# Patient Record
Sex: FEMALE | Race: BLACK OR AFRICAN AMERICAN | Employment: OTHER | URBAN - METROPOLITAN AREA
[De-identification: names, ages, dates, MRNs, and addresses within clinical notes are randomized per-mention and may not be internally consistent; named-entity substitution may affect disease eponyms.]

---

## 2019-02-23 ENCOUNTER — APPOINTMENT (OUTPATIENT)
Dept: GENERAL RADIOLOGY | Age: 84
DRG: 064 | End: 2019-02-23
Attending: HOSPITALIST
Payer: MEDICARE

## 2019-02-23 ENCOUNTER — HOSPITAL ENCOUNTER (INPATIENT)
Age: 84
LOS: 5 days | Discharge: NURSING, REHAB OR LONG-TERM CARE | DRG: 064 | End: 2019-02-28
Attending: HOSPITALIST | Admitting: HOSPITALIST
Payer: MEDICARE

## 2019-02-23 DIAGNOSIS — I77.71 INTERNAL CAROTID ARTERY DISSECTION (HCC): ICD-10-CM

## 2019-02-23 DIAGNOSIS — I63.132 CEREBROVASCULAR ACCIDENT (CVA) DUE TO EMBOLISM OF LEFT CAROTID ARTERY (HCC): ICD-10-CM

## 2019-02-23 PROBLEM — I63.9 CVA (CEREBRAL VASCULAR ACCIDENT) (HCC): Status: ACTIVE | Noted: 2019-02-23

## 2019-02-23 PROCEDURE — 65660000000 HC RM CCU STEPDOWN

## 2019-02-23 PROCEDURE — 71045 X-RAY EXAM CHEST 1 VIEW: CPT

## 2019-02-23 PROCEDURE — 74011250637 HC RX REV CODE- 250/637: Performed by: HOSPITALIST

## 2019-02-23 PROCEDURE — 74011250636 HC RX REV CODE- 250/636: Performed by: HOSPITALIST

## 2019-02-23 RX ORDER — PANTOPRAZOLE SODIUM 40 MG/1
40 TABLET, DELAYED RELEASE ORAL DAILY
Status: DISCONTINUED | OUTPATIENT
Start: 2019-02-24 | End: 2019-02-28 | Stop reason: HOSPADM

## 2019-02-23 RX ORDER — LOSARTAN POTASSIUM 50 MG/1
100 TABLET ORAL DAILY
Status: DISCONTINUED | OUTPATIENT
Start: 2019-02-23 | End: 2019-02-23

## 2019-02-23 RX ORDER — SIMVASTATIN 20 MG/1
20 TABLET, FILM COATED ORAL
Status: DISCONTINUED | OUTPATIENT
Start: 2019-02-23 | End: 2019-02-28 | Stop reason: HOSPADM

## 2019-02-23 RX ORDER — ENOXAPARIN SODIUM 100 MG/ML
40 INJECTION SUBCUTANEOUS EVERY 24 HOURS
Status: DISCONTINUED | OUTPATIENT
Start: 2019-02-23 | End: 2019-02-28 | Stop reason: HOSPADM

## 2019-02-23 RX ORDER — GUAIFENESIN 100 MG/5ML
81 LIQUID (ML) ORAL DAILY
Status: DISCONTINUED | OUTPATIENT
Start: 2019-02-24 | End: 2019-02-28 | Stop reason: HOSPADM

## 2019-02-23 RX ORDER — ACETAMINOPHEN 325 MG/1
650 TABLET ORAL
Status: DISCONTINUED | OUTPATIENT
Start: 2019-02-23 | End: 2019-02-28 | Stop reason: HOSPADM

## 2019-02-23 RX ORDER — SODIUM CHLORIDE 0.9 % (FLUSH) 0.9 %
5-40 SYRINGE (ML) INJECTION EVERY 8 HOURS
Status: DISCONTINUED | OUTPATIENT
Start: 2019-02-23 | End: 2019-02-28 | Stop reason: HOSPADM

## 2019-02-23 RX ORDER — CARBIDOPA AND LEVODOPA 25; 100 MG/1; MG/1
1 TABLET ORAL 3 TIMES DAILY
Status: DISCONTINUED | OUTPATIENT
Start: 2019-02-23 | End: 2019-02-28 | Stop reason: HOSPADM

## 2019-02-23 RX ORDER — SODIUM CHLORIDE 0.9 % (FLUSH) 0.9 %
5-40 SYRINGE (ML) INJECTION AS NEEDED
Status: DISCONTINUED | OUTPATIENT
Start: 2019-02-23 | End: 2019-02-28 | Stop reason: HOSPADM

## 2019-02-23 RX ORDER — ACETAMINOPHEN 650 MG/1
650 SUPPOSITORY RECTAL
Status: DISCONTINUED | OUTPATIENT
Start: 2019-02-23 | End: 2019-02-28 | Stop reason: HOSPADM

## 2019-02-23 RX ORDER — LOSARTAN POTASSIUM 50 MG/1
50 TABLET ORAL DAILY
Status: DISCONTINUED | OUTPATIENT
Start: 2019-02-23 | End: 2019-02-26

## 2019-02-23 RX ORDER — LABETALOL HCL 20 MG/4 ML
5 SYRINGE (ML) INTRAVENOUS
Status: DISCONTINUED | OUTPATIENT
Start: 2019-02-23 | End: 2019-02-28 | Stop reason: HOSPADM

## 2019-02-23 RX ORDER — POLYETHYLENE GLYCOL 3350 17 G/17G
17 POWDER, FOR SOLUTION ORAL DAILY
Status: DISCONTINUED | OUTPATIENT
Start: 2019-02-24 | End: 2019-02-28 | Stop reason: HOSPADM

## 2019-02-23 RX ADMIN — ENOXAPARIN SODIUM 40 MG: 40 INJECTION SUBCUTANEOUS at 19:16

## 2019-02-23 RX ADMIN — CARBIDOPA AND LEVODOPA 1 TABLET: 25; 100 TABLET ORAL at 21:21

## 2019-02-23 RX ADMIN — SIMVASTATIN 20 MG: 20 TABLET, FILM COATED ORAL at 21:21

## 2019-02-23 RX ADMIN — Medication 10 ML: at 21:21

## 2019-02-23 RX ADMIN — LOSARTAN POTASSIUM 50 MG: 50 TABLET ORAL at 19:16

## 2019-02-23 NOTE — H&P
History & Physical    Primary Care Provider: Aldair Kothari MD  Source of Information: Patient     History of Presenting Illness:   Kelley Molina is a 80 y.o. female who presents with right arm weakness    80year old female, with a hx of TIAs, parkinson's disease, orthostatic hypotension, HTN, CHF presents to [facility] with right upper extremity weakness since 10:30 AM today. Initially patient said she can't move the right arm at all, arrived in ED, The symptoms improved and able to raise against the gravity. No TPA offered. She denies headache, CP, SOB, lightheadedness, numbness or tingling. Transfer to [facility] for neuro evaluation. Per daughter patient may not compliant her home medicines. She should take ASA, sinemet, losartan, zocor. She has intermittent dizziness and labile blood pressure. The patient denies any fever, chills, chest pain, cough, congestion, recent illness, palpitations, or dysuria. Review of Systems:  General: no changes of weight or sleep patter  HEENT: no headache, no vision changes, no nose discharge, no hearing changes   RES: no wheezing, no cough, no sob  CVS: no cp, no palpitation. Muscular: no joint swelling, no muscle pain, no leg swelling  Skin: no rash, no itching   GI: no vomiting, no diarrhea  : no dysuria, no hematuria  Hemo: no gum bleeding, no petechial   Neuro: hpi, speech normal   Endo: no polydipsia   Psych: denied depression     Past Medical History:   Diagnosis Date    GERD (gastroesophageal reflux disease)     Heart failure ([address]     Hypertension       Past Surgical History:   Procedure Laterality Date    BREAST SURGERY PROCEDURE UNLISTED      l breast 2 cyst    HX GYN      hysterectomy    HX HEENT      catacacts removed     Prior to Admission medications    Medication Sig Start Date End Date Taking? Authorizing Provider   furosemide (LASIX) 40 mg tablet Take 40 mg by mouth daily.     Provider, Historical   ferrous sulfate 325 mg (65 mg iron) tablet Take 325 mg by mouth two (2) times a day. Provider, Historical   Dexlansoprazole (DEXILANT) 60 mg CpDM Take 60 mg by mouth daily. Provider, Historical   sucralfate (CARAFATE) 1 gram tablet Take 1 g by mouth three (3) times daily (with meals). Provider, Historical   ondansetron (ZOFRAN ODT) 4 mg disintegrating tablet Take 4 mg by mouth every eight (8) hours as needed for Nausea. Provider, Historical   potassium chloride SR (KLOR-CON 10) 10 mEq tablet Take 10 mEq by mouth daily (with dinner). Provider, Historical   fluticasone-salmeterol (ADVAIR HFA) 115-21 mcg/actuation inhaler Take 1 Puff by inhalation nightly. Provider, Historical   polyethylene glycol (MIRALAX) 17 gram packet Take 17 g by mouth as needed. Provider, Historical   acetaminophen (TYLENOL ARTHRITIS PAIN) 650 mg CR tablet Take 650 mg by mouth every six (6) hours as needed for Pain. Provider, Historical   lisinopril (PRINIVIL, ZESTRIL) 2.5 mg tablet Take 1 Tab by mouth daily. 12/4/13   Leeann Garcia , MD     No Known Allergies   Family History   Problem Relation Age of Onset    Stroke Mother     Heart Disease Father         SOCIAL HISTORY:  Patient resides:  Independently x   Assisted Living    SNF    With family care       Smoking history:   None x   Former    Chronic      Alcohol history:   None x   Social    Chronic      Ambulates:   Independently    w/cane x   w/walker    w/wc    CODE STATUS:  DNR    Full x   Other      Objective:     Physical Exam:     Visit Vitals  /71 (BP 1 Location: Right arm, BP Patient Position: At rest)   Pulse 86   Temp 98.1 °F (36.7 °C)   Resp 16   SpO2 100%   Breastfeeding? No      O2 Device: Room air    General:  Alert, cooperative, no distress, appears stated age. Head:  Normocephalic, without obvious abnormality, atraumatic. Eyes:  Conjunctivae/corneas clear. PERRL, EOMs intact. Nose: Nares normal. Septum midline.  Mucosa normal. No drainage or sinus tenderness. Throat: Lips, mucosa, and tongue normal. Teeth and gums normal.   Neck: Supple, symmetrical, trachea midline, no adenopathy, thyroid: no enlargement/tenderness/nodules, no carotid bruit and no JVD. Back:   Symmetric, no curvature. ROM normal. No CVA tenderness. Lungs:   Clear to auscultation bilaterally. Chest wall:  No tenderness or deformity. Heart:  Regular rate and rhythm, S1, S2 normal, no murmur, click, rub or gallop. Abdomen:   Soft, non-tender. Bowel sounds normal. No masses,  No organomegaly. Extremities: Extremities normal, atraumatic, no cyanosis or edema. Pulses: 2+ and symmetric all extremities. Skin: Skin color, texture, turgor normal. No rashes or lesions   Neurologic: CNII-XII intact. Right arm strength 5-/5. No sensation changes, vision field normal                Data Review:     Recent Days:  Recent Labs     02/23/19  1238   WBC 3.7   HGB 11.9   HCT 36.8        Recent Labs     02/23/19  1238      K 4.1      CO2 29   GLU 92   BUN 17   CREA 0.92   CA 8.9   ALB 3.7   SGOT 16   ALT 8*   INR 1.0     No results for input(s): PH, PCO2, PO2, HCO3, FIO2 in the last 72 hours.     24 Hour Results:  Recent Results (from the past 24 hour(s))   GLUCOSE, POC    Collection Time: 02/23/19 12:11 PM   Result Value Ref Range    Glucose (POC) 97 65 - 100 mg/dL    Performed by Hoang Echols    CBC WITH AUTOMATED DIFF    Collection Time: 02/23/19 12:38 PM   Result Value Ref Range    WBC 3.7 3.6 - 11.0 K/uL    RBC 4.18 3.80 - 5.20 M/uL    HGB 11.9 11.5 - 16.0 g/dL    HCT 36.8 35.0 - 47.0 %    MCV 88.0 80.0 - 99.0 FL    MCH 28.5 26.0 - 34.0 PG    MCHC 32.3 30.0 - 36.5 g/dL    RDW 14.0 11.5 - 14.5 %    PLATELET 581 594 - 460 K/uL    MPV 9.3 8.9 - 12.9 FL    NRBC 0.0 0  WBC    ABSOLUTE NRBC 0.00 0.00 - 0.01 K/uL    NEUTROPHILS 44 32 - 75 %    LYMPHOCYTES 40 12 - 49 %    MONOCYTES 13 5 - 13 %    EOSINOPHILS 1 0 - 7 %    BASOPHILS 1 0 - 1 %    IMMATURE GRANULOCYTES 0 0.0 - 0.5 %    ABS. NEUTROPHILS 1.6 (L) 1.8 - 8.0 K/UL    ABS. LYMPHOCYTES 1.5 0.8 - 3.5 K/UL    ABS. MONOCYTES 0.5 0.0 - 1.0 K/UL    ABS. EOSINOPHILS 0.1 0.0 - 0.4 K/UL    ABS. BASOPHILS 0.0 0.0 - 0.1 K/UL    ABS. IMM. GRANS. 0.0 0.00 - 0.04 K/UL    DF AUTOMATED     PROTHROMBIN TIME + INR    Collection Time: 02/23/19 12:38 PM   Result Value Ref Range    INR 1.0 0.9 - 1.1      Prothrombin time 10.0 9.0 - 93.0 sec   METABOLIC PANEL, COMPREHENSIVE    Collection Time: 02/23/19 12:38 PM   Result Value Ref Range    Sodium 145 136 - 145 mmol/L    Potassium 4.1 3.5 - 5.1 mmol/L    Chloride 107 97 - 108 mmol/L    CO2 29 21 - 32 mmol/L    Anion gap 9 5 - 15 mmol/L    Glucose 92 65 - 100 mg/dL    BUN 17 6 - 20 MG/DL    Creatinine 0.92 0.55 - 1.02 MG/DL    BUN/Creatinine ratio 18 12 - 20      GFR est AA >60 >60 ml/min/1.73m2    GFR est non-AA 58 (L) >60 ml/min/1.73m2    Calcium 8.9 8.5 - 10.1 MG/DL    Bilirubin, total 0.4 0.2 - 1.0 MG/DL    ALT (SGPT) 8 (L) 12 - 78 U/L    AST (SGOT) 16 15 - 37 U/L    Alk.  phosphatase 54 45 - 117 U/L    Protein, total 7.0 6.4 - 8.2 g/dL    Albumin 3.7 3.5 - 5.0 g/dL    Globulin 3.3 2.0 - 4.0 g/dL    A-G Ratio 1.1 1.1 - 2.2     CK    Collection Time: 02/23/19 12:38 PM   Result Value Ref Range     26 - 192 U/L   TROPONIN I    Collection Time: 02/23/19 12:38 PM   Result Value Ref Range    Troponin-I, Qt. <0.05 <0.05 ng/mL   EKG, 12 LEAD, INITIAL    Collection Time: 02/23/19  1:07 PM   Result Value Ref Range    Ventricular Rate 68 BPM    Atrial Rate 68 BPM    P-R Interval 164 ms    QRS Duration 76 ms    Q-T Interval 420 ms    QTC Calculation (Bezet) 446 ms    Calculated P Axis 75 degrees    Calculated R Axis 16 degrees    Calculated T Axis 31 degrees    Diagnosis       Normal sinus rhythm  Normal ECG  No previous ECGs available     URINALYSIS W/ REFLEX CULTURE    Collection Time: 02/23/19  2:03 PM   Result Value Ref Range    Color YELLOW/STRAW      Appearance CLEAR CLEAR      Specific gravity 1.010 1.003 - 1.030      pH (UA) 7.0 5.0 - 8.0      Protein NEGATIVE  NEG mg/dL    Glucose NEGATIVE  NEG mg/dL    Ketone NEGATIVE  NEG mg/dL    Bilirubin NEGATIVE  NEG      Blood NEGATIVE  NEG      Urobilinogen 0.2 0.2 - 1.0 EU/dL    Nitrites NEGATIVE  NEG      Leukocyte Esterase SMALL (A) NEG      WBC 0-4 0 - 4 /hpf    RBC 0-5 0 - 5 /hpf    Epithelial cells FEW FEW /lpf    Bacteria NEGATIVE  NEG /hpf    UA:UC IF INDICATED CULTURE NOT INDICATED BY UA RESULT CNI           Imaging: head ct no changes     Assessment:     Active Problems:    CVA (cerebral vascular accident) ([facility] [address] (2/23/2019)           Plan:     1. Right arm weakness: CVA vs TIA, symptoms are improving since onset. Will check brain MRI, MRA of neck and brain, Echo. A1c and FLP. Neuro consult, PT/OT/SL. Continue ASA  2. Parkinson's disease: continue sinemet. Neuro following   3. Intermittent dizziness: could due to orthostatic hypotension, patient may have autonomic dysfunction that related to her PD, will check orthostatic vitals. Cautious of BP meds  4.  Chronic diastolic congestive heart failure: follow Echo, no lasix for now   DVT and GI prophylaxis      Patient lives home alone, use a cane     Signed By: Ijeoma Wong MD     February 23, 2019

## 2019-02-24 ENCOUNTER — APPOINTMENT (OUTPATIENT)
Dept: MRI IMAGING | Age: 84
DRG: 064 | End: 2019-02-24
Attending: HOSPITALIST
Payer: MEDICARE

## 2019-02-24 ENCOUNTER — APPOINTMENT (OUTPATIENT)
Dept: NON INVASIVE DIAGNOSTICS | Age: 84
DRG: 064 | End: 2019-02-24
Attending: HOSPITALIST
Payer: MEDICARE

## 2019-02-24 ENCOUNTER — APPOINTMENT (OUTPATIENT)
Dept: CT IMAGING | Age: 84
DRG: 064 | End: 2019-02-24
Attending: PSYCHIATRY & NEUROLOGY
Payer: MEDICARE

## 2019-02-24 PROBLEM — I77.71 INTERNAL CAROTID ARTERY DISSECTION (HCC): Status: ACTIVE | Noted: 2019-02-24

## 2019-02-24 PROBLEM — I63.132 CEREBROVASCULAR ACCIDENT (CVA) DUE TO EMBOLISM OF LEFT CAROTID ARTERY (HCC): Status: ACTIVE | Noted: 2019-02-23

## 2019-02-24 LAB
ECHO AO ROOT DIAM: 2.48 CM
ECHO LA MAJOR AXIS: 3.06 CM
ECHO LA TO AORTIC ROOT RATIO: 1.24
ECHO LV INTERNAL DIMENSION DIASTOLIC: 3.94 CM (ref 3.9–5.3)
ECHO LV INTERNAL DIMENSION SYSTOLIC: 2.34 CM
ECHO LV IVSD: 0.94 CM (ref 0.6–0.9)
ECHO LV MASS 2D: 126.2 G (ref 67–162)
ECHO LV MASS INDEX 2D: 70.7 G/M2 (ref 43–95)
ECHO LV POSTERIOR WALL DIASTOLIC: 0.92 CM (ref 0.6–0.9)
ECHO MV A VELOCITY: 45.29 CM/S
ECHO MV AREA PHT: 1.8 CM2
ECHO MV E DECELERATION TIME (DT): 414.8 MS
ECHO MV E VELOCITY: 0.29 CM/S
ECHO MV E/A RATIO: 0.01
ECHO MV PRESSURE HALF TIME (PHT): 120.3 MS
ECHO PULMONARY ARTERY SYSTOLIC PRESSURE (PASP): 41 MMHG
ECHO TV REGURGITANT MAX VELOCITY: 276.5 CM/S
ECHO TV REGURGITANT PEAK GRADIENT: 30.6 MMHG

## 2019-02-24 PROCEDURE — 74011250636 HC RX REV CODE- 250/636: Performed by: HOSPITALIST

## 2019-02-24 PROCEDURE — 65660000000 HC RM CCU STEPDOWN

## 2019-02-24 PROCEDURE — 70547 MR ANGIOGRAPHY NECK W/O DYE: CPT

## 2019-02-24 PROCEDURE — 74011636320 HC RX REV CODE- 636/320: Performed by: INTERNAL MEDICINE

## 2019-02-24 PROCEDURE — 70498 CT ANGIOGRAPHY NECK: CPT

## 2019-02-24 PROCEDURE — 74011250637 HC RX REV CODE- 250/637: Performed by: PSYCHIATRY & NEUROLOGY

## 2019-02-24 PROCEDURE — 74011000258 HC RX REV CODE- 258: Performed by: INTERNAL MEDICINE

## 2019-02-24 PROCEDURE — 70544 MR ANGIOGRAPHY HEAD W/O DYE: CPT

## 2019-02-24 PROCEDURE — 70551 MRI BRAIN STEM W/O DYE: CPT

## 2019-02-24 PROCEDURE — 74011250636 HC RX REV CODE- 250/636: Performed by: INTERNAL MEDICINE

## 2019-02-24 PROCEDURE — 93306 TTE W/DOPPLER COMPLETE: CPT

## 2019-02-24 PROCEDURE — 70496 CT ANGIOGRAPHY HEAD: CPT

## 2019-02-24 PROCEDURE — 74011250637 HC RX REV CODE- 250/637: Performed by: HOSPITALIST

## 2019-02-24 RX ORDER — SODIUM CHLORIDE 0.9 % (FLUSH) 0.9 %
10 SYRINGE (ML) INJECTION
Status: COMPLETED | OUTPATIENT
Start: 2019-02-24 | End: 2019-02-24

## 2019-02-24 RX ORDER — CLOPIDOGREL BISULFATE 75 MG/1
75 TABLET ORAL DAILY
Status: DISCONTINUED | OUTPATIENT
Start: 2019-02-24 | End: 2019-02-25

## 2019-02-24 RX ORDER — SODIUM CHLORIDE 9 MG/ML
75 INJECTION, SOLUTION INTRAVENOUS CONTINUOUS
Status: DISCONTINUED | OUTPATIENT
Start: 2019-02-24 | End: 2019-02-26

## 2019-02-24 RX ADMIN — SODIUM CHLORIDE 75 ML/HR: 900 INJECTION, SOLUTION INTRAVENOUS at 15:21

## 2019-02-24 RX ADMIN — SODIUM CHLORIDE 500 ML: 900 INJECTION, SOLUTION INTRAVENOUS at 14:51

## 2019-02-24 RX ADMIN — CARBIDOPA AND LEVODOPA 1 TABLET: 25; 100 TABLET ORAL at 15:21

## 2019-02-24 RX ADMIN — CARBIDOPA AND LEVODOPA 1 TABLET: 25; 100 TABLET ORAL at 08:01

## 2019-02-24 RX ADMIN — POLYETHYLENE GLYCOL 3350 17 G: 17 POWDER, FOR SOLUTION ORAL at 08:01

## 2019-02-24 RX ADMIN — ENOXAPARIN SODIUM 40 MG: 40 INJECTION SUBCUTANEOUS at 18:00

## 2019-02-24 RX ADMIN — CARBIDOPA AND LEVODOPA 1 TABLET: 25; 100 TABLET ORAL at 21:11

## 2019-02-24 RX ADMIN — Medication 10 ML: at 21:11

## 2019-02-24 RX ADMIN — Medication 10 ML: at 13:28

## 2019-02-24 RX ADMIN — Medication 10 ML: at 11:38

## 2019-02-24 RX ADMIN — SODIUM CHLORIDE 100 ML: 900 INJECTION, SOLUTION INTRAVENOUS at 13:28

## 2019-02-24 RX ADMIN — CLOPIDOGREL 75 MG: 75 TABLET, FILM COATED ORAL at 11:37

## 2019-02-24 RX ADMIN — ASPIRIN 81 MG CHEWABLE TABLET 81 MG: 81 TABLET CHEWABLE at 08:01

## 2019-02-24 RX ADMIN — IOPAMIDOL 100 ML: 755 INJECTION, SOLUTION INTRAVENOUS at 13:28

## 2019-02-24 RX ADMIN — LOSARTAN POTASSIUM 50 MG: 50 TABLET ORAL at 08:01

## 2019-02-24 RX ADMIN — SIMVASTATIN 20 MG: 20 TABLET, FILM COATED ORAL at 21:11

## 2019-02-24 RX ADMIN — Medication 10 ML: at 06:17

## 2019-02-24 RX ADMIN — PANTOPRAZOLE SODIUM 40 MG: 40 TABLET, DELAYED RELEASE ORAL at 08:01

## 2019-02-24 NOTE — PROGRESS NOTES
1400 dr Cheng Most notified of positive orthostatics and IVF recommended per neuro, per md, will place orders

## 2019-02-24 NOTE — PROGRESS NOTES
Problem: Falls - Risk of  Goal: *Absence of Falls  Document Ana Fall Risk and appropriate interventions in the flowsheet. Outcome: Progressing Towards Goal  Fall Risk Interventions:  Mobility Interventions: Communicate number of staff needed for ambulation/transfer, OT consult for ADLs, Patient to call before getting OOB, PT Consult for mobility concerns         Medication Interventions: Patient to call before getting OOB, Teach patient to arise slowly    Elimination Interventions: Call light in reach, Patient to call for help with toileting needs, Toileting schedule/hourly rounds             Problem: Pressure Injury - Risk of  Goal: *Prevention of pressure injury  Document Salty Scale and appropriate interventions in the flowsheet. Outcome: Progressing Towards Goal  Pressure Injury Interventions:             Activity Interventions: Pressure redistribution bed/mattress(bed type), Increase time out of bed, PT/OT evaluation    Mobility Interventions: Pressure redistribution bed/mattress (bed type), PT/OT evaluation    Nutrition Interventions: Document food/fluid/supplement intake

## 2019-02-24 NOTE — PROGRESS NOTES
02/24/19 0615   Vital Signs   Temp 98.3 °F (36.8 °C)   Temp Source Oral   Pulse (Heart Rate) 75   Heart Rate Source Monitor   Cardiac Rhythm NSR   Resp Rate 17   O2 Sat (%) 96 %   Level of Consciousness Alert   /68   MAP (Calculated) 97   BP 1 Method Automatic   BP 1 Location Left arm   BP Patient Position At rest   More BP/Pulse rows needed? Yes   MEWS Score 1   Alarms Set and Audible Cardiac alarms   Box Number 656   Electrodes Replaced No   Additional Blood Pressure/Pulse Data   Pulse 2 78   BP 2 151/67   MAP 2 (Calculated) 95   BP 2 Location Left arm   BP Method 2 Automatic   Patient Position 2 Sitting   Pulse 3 87   BP 3 (!) 62/40   MAP 3 (Calculated) (!) 47   BP 3 Location Left arm   BP Method 3 Automatic   Patient Position 3 Standing   Pain 1   Pain Scale 1 Numeric (0 - 10)   Pain Intensity 1 0   Patient Stated Pain Goal 0   Oxygen Therapy   O2 Device Room air   Patient Observation   Patient Turned Turns self   Orthostatics completed. BP 62/40 when standing. Patient c/o mild dizziness. Patient returned to lying position. BP increased to 176/72 in lying position. Day shift RN notified. Call bell in reach; patient denies any needs at this time.

## 2019-02-24 NOTE — PROGRESS NOTES
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 And assoc:Late acute versus subacute left MCA territory infarcts involve the left  parietal lobe and sensory cortex. . Cont w  Rt upper ext weakness,numbness. 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 And assoc:Late acute versus subacute left MCA territory infarcts involve the left  parietal lobe and sensory cortex. . Cont w  Rt upper ext weakness,numbness. Distal lt ICA dissection, for asa/plavix     Postural hypotension, ivf bolus and maintenance     Code status: full   DVT prophylaxis: Lovenox     Care Plan discussed with: Patient/Family, Nurse and Consultant Dr. 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 Vital Signs:    Last 24hrs VS reviewed since prior progress note. Most recent are:  Visit Vitals  /85 (BP 1 Location: Left arm, BP Patient Position: At rest)   Pulse 80   Temp 98.1 °F (36.7 °C)   Resp 16   Ht 5' 1\" (1.549 m)   Wt 79.4 kg (175 lb)   SpO2 97%   Breastfeeding?  No   BMI 33.07 kg/m²       No intake or output data in the 24 hours ending 19 [id number] Physical Examination:             Constitutional:  No acute distress, cooperative, pleasant    ENT:  Oral mucous moist, oropharynx benign. Neck supple,    Resp:  CTA bilaterally. No wheezing/rhonchi/rales. No accessory muscle use   CV:  Regular rhythm, normal rate, no murmurs, gallops, rubs    GI:  Soft, non distended, non tender. normoactive bowel sounds, no hepatosplenomegaly     Musculoskeletal:  No edema, warm, 2+ pulses throughout    Neurologic:  Moves all extremities. Rt UP 2/5  AAOx3, CN II-XII reviewed     Psych:  Good insight, Not anxious nor agitated. Skin:  Good turgor, no rashes or ulcers       Data Review:    Review and/or order of clinical lab test      Labs:     Recent Labs     02/23/19  1238   WBC 3.7   HGB 11.9   HCT 36.8        Recent Labs     02/23/19  1238      K 4.1      CO2 29   BUN 17   CREA 0.92   GLU 92   CA 8.9     Recent Labs     02/23/19  1238   SGOT 16   ALT 8*   AP 54   TBILI 0.4   TP 7.0   ALB 3.7   GLOB 3.3     Recent Labs     02/23/19  1238   INR 1.0   PTP 10.0      No results for input(s): FE, TIBC, PSAT, FERR in the last 72 hours. No results found for: FOL, RBCF   No results for input(s): PH, PCO2, PO2 in the last 72 hours.   Recent Labs     02/23/19  1238      TROIQ <0.05     No results found for: CHOL, CHOLX, CHLST, CHOLV, HDL, LDL, LDLC, DLDLP, TGLX, TRIGL, TRIGP, CHHD, CHHDX  Lab Results   Component Value Date/Time    Glucose (POC) 97 02/23/2019 12:11 PM     Lab Results   Component Value Date/Time    Color YELLOW/STRAW 02/23/2019 02:03 PM    Appearance CLEAR 02/23/2019 02:03 PM    Specific gravity 1.010 02/23/2019 02:03 PM    pH (UA) 7.0 02/23/2019 02:03 PM    Protein NEGATIVE  02/23/2019 02:03 PM    Glucose NEGATIVE  02/23/2019 02:03 PM    Ketone NEGATIVE  02/23/2019 02:03 PM    Bilirubin NEGATIVE  02/23/2019 02:03 PM    Urobilinogen 0.2 02/23/2019 02:03 PM    Nitrites NEGATIVE  02/23/2019 02:03 PM    Leukocyte Esterase SMALL (A) 02/23/2019 02:03 PM    Epithelial cells FEW 02/23/2019 02:03 PM    Bacteria NEGATIVE  02/23/2019 02:03 PM    WBC 0-4 02/23/2019 02:03 PM    RBC 0-5 02/23/2019 02:03 PM         Medications Reviewed:     Current Facility-Administered Medications   Medication Dose Route Frequency    clopidogrel (PLAVIX) tablet 75 mg  75 mg Oral DAILY    sodium chloride 0.9 % bolus infusion 500 mL  500 mL IntraVENous ONCE    0.9% sodium chloride infusion  75 mL/hr IntraVENous CONTINUOUS    polyethylene glycol (MIRALAX) packet 17 g  17 g Oral DAILY    sodium chloride (NS) flush 5-40 mL  5-40 mL IntraVENous Q8H    sodium chloride (NS) flush 5-40 mL  5-40 mL IntraVENous PRN    acetaminophen (TYLENOL) tablet 650 mg  650 mg Oral Q4H PRN    Or    acetaminophen (TYLENOL) solution 650 mg  650 mg Per NG tube Q4H PRN    Or    acetaminophen (TYLENOL) suppository 650 mg  650 mg Rectal Q4H PRN    aspirin chewable tablet 81 mg  81 mg Oral DAILY    labetalol (NORMODYNE;TRANDATE) 20 mg/4 mL (5 mg/mL) injection 5 mg  5 mg IntraVENous Q10MIN PRN    enoxaparin (LOVENOX) injection 40 mg  40 mg SubCUTAneous Q24H    carbidopa-levodopa (SINEMET)  mg per tablet 1 Tab  1 Tab Oral TID    pantoprazole (PROTONIX) tablet 40 mg  40 mg Oral DAILY    losartan (COZAAR) tablet 50 mg  50 mg Oral DAILY    simvastatin (ZOCOR) tablet 20 mg  20 mg Oral QHS     ______________________________________________________________________  EXPECTED LENGTH OF STAY: - - -  ACTUAL LENGTH OF STAY:          1                 Marie Cordova MD

## 2019-02-24 NOTE — CONSULTS
Neurointerventional Surgery Consult  aHylee , AGACNP-BC  Neurocritical Care NP  [phone]    Patient: Angel Funk MRN: [id number]  SSN: [id number]    YOB: 1935  Age: 80 y.o. Sex: female        Chief Complaint: Right arm weakness    Subjective:      Angel Funk is a 80 y.o. female with a PMH of TIAs, Parkinson's Disease, orthostatic hypotension, hypertension, CHF, and hyperlipidemia who initially presented to [facility] on 2/23/2019 with right upper extremity weakness. Symptoms started around 11:00 am on yesterday. Patient reported that she could not move her arm at all, but it has improved. Patient reported she also felt like she had some slurred speech with the right arm weakness. She was brought to the ED at [facility] via EMS and a CT of the head was negative for an acute abnormality. She reportedly refused IV tPA. She was transferred to [facility] for further care. An MRI/MRA of the brain and MRA of the neck ws performed which showed left acute vs subacute MCA territory infarcts involving the left parietal lobe and sensory cortex. Moderate chronic microvascular ischemic disease and focal dissection of the distal left ICA. A CTA of the head and neck was further done which confirmed a focal dissection of the distal left ICA. She has been staying with her daughter for the past couple weeks as the patient has been feeling ill, weak, and dizzy. She had chills during this time, but no fever. Per her daughter, she has been non-compliant with taking her home medications. Her daughter is a physician and has been managing her medications. The patient has no history of a stroke. She is taking a baby aspirin at home, however, it was most likely not taken consistently due to non-compliance. Her daughter reports that she has had episodes of TIAs within the past five years that have involved dizziness and weakness in her right arm.  Her daughter reported that she generally has had dizziness, weakness, and fatigue intermittently for the past 8-10 years. She has had a recent visit to the hospital at [facility] within the past two weeks for dizziness related to orthostatic hypotension. [facility] was consulted for further recommendations on endovascular treatment regarding the patient's left ICA dissection. She denies any recent falls, head or neck trauma, headache, tinnitus, visual changes, difficulty swallowing, numbness, back pain, chest pain, dyspnea, palpitations, nausea, vomiting, abdominal pain, muscle/joint stiffness, or easy bruising. She is positive for weakness, neck pain, sinus problems, constipation, dizziness, balance issues, tremors in right hand, and chronic tingling in ankles and thighs.      Past Medical History:   Diagnosis Date    GERD (gastroesophageal reflux disease)     Heart failure ([address]     Hypertension      Past Surgical History:   Procedure Laterality Date    BREAST SURGERY PROCEDURE UNLISTED      l breast 2 cyst    HX GYN      hysterectomy    HX HEENT      catacacts removed      Family History   Problem Relation Age of Onset    Stroke Mother     Heart Disease Father      Social History     Tobacco Use    Smoking status: Never Smoker   Substance Use Topics    Alcohol use: Yes     Comment: occasional      Current Facility-Administered Medications   Medication Dose Route Frequency Provider Last Rate Last Dose    clopidogrel (PLAVIX) tablet 75 mg  75 mg Oral DAILY Hannah Millan K, DO   75 mg at 02/24/19 1137    0.9% sodium chloride infusion  75 mL/hr IntraVENous CONTINUOUS Jasmine Barney MD 75 mL/hr at 02/24/19 1521 75 mL/hr at 02/24/19 1521    polyethylene glycol (MIRALAX) packet 17 g  17 g Oral DAILY Everette Marsh MD   17 g at 02/24/19 0801    sodium chloride (NS) flush 5-40 mL  5-40 mL IntraVENous Q8H Jessica Roy MD   10 mL at 02/24/19 1138    sodium chloride (NS) flush 5-40 mL  5-40 mL IntraVENous PRN Everette Marsh MD        acetaminophen (TYLENOL) tablet 650 mg  650 mg Oral Q4H PRN Drew Kwon MD        Or   [employer] acetaminophen (TYLENOL) solution 650 mg  650 mg Per NG tube Q4H PRN Drew Kwon MD        Or   [employer] acetaminophen (TYLENOL) suppository 650 mg  650 mg Rectal Q4H PRN Drew Kwon MD        aspirin chewable tablet 81 mg  81 mg Oral DAILY Drew Kwon MD   81 mg at 02/24/19 0801    labetalol (NORMODYNE;TRANDATE) 20 mg/4 mL (5 mg/mL) injection 5 mg  5 mg IntraVENous Q10MIN PRN Drew Kwon MD        enoxaparin (LOVENOX) injection 40 mg  40 mg SubCUTAneous Q24H Jessica Roy MD   40 mg at 02/23/19 1916    carbidopa-levodopa (SINEMET)  mg per tablet 1 Tab  1 Tab Oral TID Drew Kwon MD   1 Tab at 02/24/19 1521    pantoprazole (PROTONIX) tablet 40 mg  40 mg Oral DAILY Drew Kwon MD   40 mg at 02/24/19 0801    losartan (COZAAR) tablet 50 mg  50 mg Oral DAILY Drew Kwon MD   50 mg at 02/24/19 0801    simvastatin (ZOCOR) tablet 20 mg  20 mg Oral QHS Jessica Roy MD   20 mg at 02/23/19 2121        No Known Allergies    Review of Systems:  A comprehensive review of systems was negative except for that written in the History of Present Illness. Objective:     Vitals:    02/24/19 1017 02/24/19 1020 02/24/19 1034 02/24/19 1400   BP: 121/58 (!) 78/46 139/50 159/85   Pulse: 82 89  80   Resp:    16   Temp:    98.1 °F (36.7 °C)   SpO2:    97%   Weight:       Height:            Physical Exam:  GENERAL: alert, cooperative, no distress, appears stated age  EYE: conjunctivae/corneas clear. PERRL, EOM's intact. NECK: neck supple and symmetrical, no carotid bruits  LUNG: clear to auscultation bilaterally  HEART: regular rate and rhythm, S1, S2 normal, no murmur, click, rub or gallop  EXTREMITIES:  extremities normal, atraumatic, no cyanosis, trace edema bilateral ankles  SKIN: Normal. and no rash or abnormalities    Neurologic Exam:  Mental Status:  Alert and oriented x 4. Appropriate affect, mood and behavior. Language:    Normal fluency, repetition, comprehension and naming.     Cranial Nerves: Pupils equal, round and reactive to light. Visual fields full to confrontation. Extraocular movements intact. Facial sensation intact. Full facial strength, no asymmetry. Hearing grossly intact bilaterally. No dysarthria. Tongue protrudes to midline, palate elevates symmetrically. Shoulder shrug 5/5 bilaterally. Motor:    Right arm pronator drift     Bulk and tone normal.      5/5 power in LUE, LLE, RLE                                       3/5 power RUE     Noted lower chin/mouth to be trembling intermittently. Sensation:    Sensation intact throughout to light touch and pinprick      Coordination & Gait: FTN and HTS intact. Gait deferred. Recent Results (from the past 24 hour(s))   ECHO ADULT COMPLETE    Collection Time: 02/24/19  8:55 AM   Result Value Ref Range    Ao Root D 2.48 cm    LVIDd 3.94 3.9 - 5.3 cm    LVPWd 0.92 (A) 0.6 - 0.9 cm    LVIDs 2.34 cm    IVSd 0.94 (A) 0.6 - 0.9 cm    MVA (PHT) 1.8 cm2    MV A Joselo 45.29 cm/s    MV E Joselo 0.29 cm/s    MV E/A 0.01     Left Atrium to Aortic Root Ratio 1.24     LV Mass .2 67 - 162 g    LV Mass AL Index 70.7 43 - 95 g/m2    Mitral Valve E Wave Deceleration Time 414.8 ms    Mitral Valve Pressure Half-time 120.3 ms    Left Atrium Major Axis 3.06 cm    Triscuspid Valve Regurgitation Peak Gradient 30.6 mmHg    TR Max Velocity 276.50 cm/s    PASP 41.0 mmHg     Imaging:  CT of Head on 2/23/2019 shows   IMPRESSION: No acute intracranial abnormality. MRI of Brain, MRA of Brain, and MRA of neck on 2/24/2019 shows   IMPRESSION:  1. 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 2. Moderate chronic microvascular ischemic disease. 3. Focal dissection of the distal left internal carotid artery proximal to the  petrous portion. 4. Limited MR angiography neck due to technique. Recommendation: CT angiography head and neck.   I discussed this impression with BARRY Richmond Ellison at 1049 hours on 2/24/2019. CTA of Head and Neck on 2/24/2019 preliminary report shows     CT angiography confirms focal dissection of the distal left internal carotid  artery proximal to the petrous portion. Length of the dissection is  approximately 7 mm in craniocaudal dimension. No associated thrombosis.      No flow-limiting stenosis or vascular occlusion in the intracranial  vasculature. No aneurysm. Common carotid arteries are patent. Carotid  bifurcations are patent. Right internal carotid artery is within normal limits. No pathologic enhancement in the brain. Ossified periosteal reaction in the  right parietal lobe is likely due to old injury.     Final report to follow. Imaging independently reviewed by myself and Dr. Christiana Hernandez. Assessment:     Hospital Problems  Never Reviewed          Codes Class Noted POA    Internal carotid artery dissection [facility]) ICD-10-CM: I77.71  ICD-9-CM: 443.21  2/24/2019 Unknown        * (Principal) Cerebrovascular accident (CVA) due to embolism of left carotid artery ([facility] [address] ICD-10-CM: K64.324  ICD-9-CM: 434.11  2/23/2019 Yes              Plan:   1. Acute Ischemic CVA   - MRI of Brain shows left MCA territory infarcts involving the left parietal lobe and sensory cortex. - patient still has significant right arm weakness, that has reportedly improved, otherwise no other focal neuro deficits   - CVA most likely secondary to embolism from the left internal carotid artery dissection   - TTE shows EF 61-65%, mild grade 1 diastolic dysfunction, mild TVR, and mild pulmonary hypertension, otherwise no other significant abnormalities   - patient on MARLON with ASA/Plavix daily per neurology for stroke prevention given left ICA dissection   - PT/OT eval, SLP as needed    - Neurology following  2.  Left internal carotid artery dissection   - Imaging consistent with focal dissection of the distal left ICA   - agree with medical therapy with MARLON, ASA/Plavix daily as ordered by neurology   - no surgical intervention is needed   - recommend checking P2Y12 in the am to assess therapeutic response to Plavix   - medical management per neurology   - Patient can follow up in [facility] clinic upon discharge in one month for imaging surveillance of the dissection   3. Hx of Parkinson's Disease   - on Sinemet TID   - Hospitalist/Neurology following  4. Hx of HTN   - on home med Losartan daily   - Hospitalist following   5. Hx of Hyperlipidemia   - continue Zocor 20 mg QHS   - Hospitalist following     Plan discussed with Dr. Claude Herrera, Dr. Pamela Chavez, patient, and family. Thank you for this consult and participating in the care of this patient. I have discussed the diagnosis with the patient and the intended plan as seen in the above orders. Patient is in agreement.       Signed By: Gustabo Corbin NP     February 24, 2019

## 2019-02-24 NOTE — PROGRESS NOTES
Bedside shift change report given to [address] [place] [address] (oncoming nurse) by Prince Cordova (offgoing nurse). Report included the following information SBAR, Procedure Summary, MAR, Accordion and Cardiac Rhythm NSR.

## 2019-02-24 NOTE — PROGRESS NOTES
Physical Therapy Note  2/24/19    Order acknowledged and chart reviewed. Pt currently off unit for testing. Will follow up later today vs tomorrow as able/appropriate. 1232: Per neurology note, pt with acute left MCA infarct probably secondary to a left carotid dissection that embolized. Neuro interventional team has been consulted. Will defer and follow up once appropriate.     Thank you,  Sydnee Ellsworth, PT, DPT

## 2019-02-24 NOTE — CONSULTS
NEUROLOGY  2/24/2019     Consulted by: Cy Gomez MD        Patient ID:  Willow Zamarripa  [id number]  80 y.o.  1935    CC: Right arm weakness    HPI    Ms. Sulema Oswald is an 45-year-old woman with multiple risk factors for stroke [facility] who is here because yesterday morning she woke up and felt her right arm was weak. She thought it was getting better but overnight it got worse. Today she can lift it but it is not improving any further. For the past week she is been living with her daughter who is a physician who has been managing her medications. Patient tells me she takes aspirin every day. She denies headache or vision change. Denies any recent falls or trauma. MRI was done this morning and on my review there is PA infarct. 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 Review of Systems   Constitutional: Positive for malaise/fatigue. Eyes: Negative for double vision. Gastrointestinal: Negative for nausea. Musculoskeletal: Negative for falls. Neurological: Positive for speech change, focal weakness and weakness. Negative for dizziness and headaches. All other systems reviewed and are negative.       Past Medical History:   Diagnosis Date    GERD (gastroesophageal reflux disease)     Heart failure (HCC)     Hypertension      Family History   Problem Relation Age of Onset    Stroke Mother     Heart Disease Father      Social History     Socioeconomic History    Marital status:      Spouse name: Not on file    Number of children: Not on file    Years of education: Not on file    Highest education level: Not on file   Social Needs    Financial resource strain: Not on file    Food insecurity - worry: Not on file    Food insecurity - inability: Not on file   [employer] needs - medical: Not on file   [employer] needs - non-medical: Not on file Occupational History    Not on file   Tobacco Use    Smoking status: Never Smoker   Substance and Sexual Activity    Alcohol use: Yes     Comment: occasional    Drug use: No    Sexual activity: Not on file   Other Topics Concern    Not on file   Social History Narrative    Not on file     Current Facility-Administered Medications   Medication Dose Route Frequency    polyethylene glycol (MIRALAX) packet 17 g  17 g Oral DAILY    sodium chloride (NS) flush 5-40 mL  5-40 mL IntraVENous Q8H    sodium chloride (NS) flush 5-40 mL  5-40 mL IntraVENous PRN    acetaminophen (TYLENOL) tablet 650 mg  650 mg Oral Q4H PRN    Or    acetaminophen (TYLENOL) solution 650 mg  650 mg Per NG tube Q4H PRN    Or    acetaminophen (TYLENOL) suppository 650 mg  650 mg Rectal Q4H PRN    aspirin chewable tablet 81 mg  81 mg Oral DAILY    labetalol (NORMODYNE;TRANDATE) 20 mg/4 mL (5 mg/mL) injection 5 mg  5 mg IntraVENous Q10MIN PRN    enoxaparin (LOVENOX) injection 40 mg  40 mg SubCUTAneous Q24H    carbidopa-levodopa (SINEMET)  mg per tablet 1 Tab  1 Tab Oral TID    pantoprazole (PROTONIX) tablet 40 mg  40 mg Oral DAILY    losartan (COZAAR) tablet 50 mg  50 mg Oral DAILY    simvastatin (ZOCOR) tablet 20 mg  20 mg Oral QHS     No Known Allergies    Visit Vitals  /50 (BP Patient Position: At rest)   Pulse 89   Temp 98.1 °F (36.7 °C)   Resp 16   Ht 5' 1\" (1.549 m)   Wt 79.4 kg (175 lb)   SpO2 98%   Breastfeeding? No   BMI 33.07 kg/m²     Physical Exam   Constitutional: She appears well-developed and well-nourished. Cardiovascular: Normal rate. Pulmonary/Chest: Effort normal.   Skin: Skin is warm and dry. Psychiatric: Her behavior is normal.   Vitals reviewed. Neurologic Exam     Mental Status   Elderly woman in bed. Awake and alert. Appropriate. She has a very masklike face with reduced blink rate  Voice is soft. She follows commands slowly.   Pupils are equal, EOMI  Face asymmetric to the right with midline tongue  Facial sensation intact  Right arm 3/5 motor strength, all else 4+ Global  Light touch intact throughout  Gait deferred due to condition            Lab Results   Component Value Date/Time    WBC 3.7 02/23/2019 12:38 PM    HGB 11.9 02/23/2019 12:38 PM    HCT 36.8 02/23/2019 12:38 PM    PLATELET 940 43/86/6779 12:38 PM    MCV 88.0 02/23/2019 12:38 PM     Lab Results   Component Value Date/Time    Glucose 92 02/23/2019 12:38 PM    Glucose (POC) 97 02/23/2019 12:11 PM    Creatinine 0.92 02/23/2019 12:38 PM      No results found for: CHOL, CHOLPOCT, HDL, LDL, LDLC, LDLCPOC, LDLCEXT, TRIGL, TGLPOCT, CHHD, CHHDX  Lab Results   Component Value Date/Time    ALT (SGPT) 8 (L) 02/23/2019 12:38 PM    AST (SGOT) 16 02/23/2019 12:38 PM    Alk. phosphatase 54 02/23/2019 12:38 PM    Bilirubin, total 0.4 02/23/2019 12:38 PM    Albumin 3.7 02/23/2019 12:38 PM    Protein, total 7.0 02/23/2019 12:38 PM    INR 1.0 02/23/2019 12:38 PM    Prothrombin time 10.0 02/23/2019 12:38 PM    PLATELET 965 88/01/0752 12:38 PM        CT Results (maximum last 3): Results from [place] encounter on 02/23/19   CT CODE NEURO HEAD WO CONTRAST    Narrative HEAD CT WITHOUT CONTRAST: 2/23/2019 12:18 PM    INDICATION: CVA vs TIA/right sided weakness    COMPARISON: None. PROCEDURE: Axial images of the head were obtained without contrast. Coronal and  sagittal reformats were performed. CT dose reduction was achieved through use of  a standardized protocol tailored for this examination and automatic exposure  control for dose modulation. FINDINGS: Periventricular white matter hypodensity is nonspecific, but  consistent with chronic small vessel ischemic disease. Mild cerebral atrophy is  age concordant. No loss of gray-white differentiation to suggest late acute or  early subacute infarction. No mass effect or intracranial hemorrhage. Incidental  note is made of a right parietal, outer table, exophytic, calvarial osteoma. Impression IMPRESSION: No acute intracranial abnormality. MRI Results (maximum last 3): Results from [facility] encounter on 02/23/19   MRI BRAIN WO CONT    Narrative EXAM:  MRI BRAIN WO CONT, MRA NECK WO CONT, MRA BRAIN WO CONT    INDICATION:  Right upper extremity weakness. Intermittent dizziness. Parkinson's  disease. COMPARISON: None. TECHNIQUE: Multisequence, multiplanar MRI of the brain without contrast.  Noncontrast time of flight MR angiography of the head. Noncontrast  time-of-flight MR angiography of the neck. (3 separate studies reported  together) Maximum intensity projection reformats of the MR angiography. Examination  performed on the 3 Parul magnet. FINDINGS: MRI Fredda Pummel: Multiple small areas of restricted diffusion in the  posterior left MCA territory involving the left parietal lobe and postcentral  gyrus. There is associated hyperintense T2/flair signal in these areas of  restricted diffusion. No other restricted diffusion. Moderate chronic microvascular ischemic disease in the cerebral white matter. No hydrocephalus. No mass effect or midline shift. No extra-axial fluid  collection. The midline structures, including the cervicomedullary junction, are within  normal limits. Brain MRA:  The vertebral arteries are codominant. The basilar artery and its  branches are normal. There is dissection in the distal left internal carotid  artery, just proximal to the petrous portion. Distal right internal carotid  artery is patent. Anterior and middle cerebral arteries are patent. . There is no  flow-limiting intracranial stenosis. There is no aneurysm. There are no sizable  posterior communicating arteries. Neck MRA: there is no flow-limiting stenosis. Bilateral common carotid arteries  are patent bilateral carotid bifurcations are patent. Distal left internal  carotid artery dissection is obscured by artifact. Right internal carotid artery  is patent.   All measurements utilize NASCET criteria. Impression IMPRESSION:  1. 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 2. Moderate chronic microvascular ischemic disease. 3. Focal dissection of the distal left internal carotid artery proximal to the  petrous portion. 4. Limited MR angiography neck due to technique. Recommendation: CT angiography head and neck. I discussed this impression with BARRY Norman at 1049 hours on 2/24/2019. MRA BRAIN WO CONT    Narrative EXAM:  MRI BRAIN WO CONT, MRA NECK WO CONT, MRA BRAIN WO CONT    INDICATION:  Right upper extremity weakness. Intermittent dizziness. Parkinson's  disease. COMPARISON: None. TECHNIQUE: Multisequence, multiplanar MRI of the brain without contrast.  Noncontrast time of flight MR angiography of the head. Noncontrast  time-of-flight MR angiography of the neck. (3 separate studies reported  together) Maximum intensity projection reformats of the MR angiography. Examination  performed on the 3 Parul magnet. FINDINGS: MRI [place]bone: Multiple small areas of restricted diffusion in the  posterior left MCA territory involving the left parietal lobe and postcentral  gyrus. There is associated hyperintense T2/flair signal in these areas of  restricted diffusion. No other restricted diffusion. Moderate chronic microvascular ischemic disease in the cerebral white matter. No hydrocephalus. No mass effect or midline shift. No extra-axial fluid  collection. The midline structures, including the cervicomedullary junction, are within  normal limits. Brain MRA:  The vertebral arteries are codominant. The basilar artery and its  branches are normal. There is dissection in the distal left internal carotid  artery, just proximal to the petrous portion. Distal right internal carotid  artery is patent. Anterior and middle cerebral arteries are patent. . There is no  flow-limiting intracranial stenosis.  There is no aneurysm. There are no sizable  posterior communicating arteries. Neck MRA: there is no flow-limiting stenosis. Bilateral common carotid arteries  are patent bilateral carotid bifurcations are patent. Distal left internal  carotid artery dissection is obscured by artifact. Right internal carotid artery  is patent. All measurements utilize NASCET criteria. Impression IMPRESSION:  1. 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 2. Moderate chronic microvascular ischemic disease. 3. Focal dissection of the distal left internal carotid artery proximal to the  petrous portion. 4. Limited MR angiography neck due to technique. Recommendation: CT angiography head and neck. I discussed this impression with BARRY Almaraz at 1049 hours on 2/24/2019. MRA NECK WO CONT    Narrative EXAM:  MRI BRAIN WO CONT, MRA NECK WO CONT, MRA BRAIN WO CONT    INDICATION:  Right upper extremity weakness. Intermittent dizziness. Parkinson's  disease. COMPARISON: None. TECHNIQUE: Multisequence, multiplanar MRI of the brain without contrast.  Noncontrast time of flight MR angiography of the head. Noncontrast  time-of-flight MR angiography of the neck. (3 separate studies reported  together) Maximum intensity projection reformats of the MR angiography. Examination  performed on the 3 Parul magnet. FINDINGS: MRI Mel Hitch: Multiple small areas of restricted diffusion in the  posterior left MCA territory involving the left parietal lobe and postcentral  gyrus. There is associated hyperintense T2/flair signal in these areas of  restricted diffusion. No other restricted diffusion. Moderate chronic microvascular ischemic disease in the cerebral white matter. No hydrocephalus. No mass effect or midline shift. No extra-axial fluid  collection. The midline structures, including the cervicomedullary junction, are within  normal limits.     Brain MRA:  The vertebral arteries are codominant. The basilar artery and its  branches are normal. There is dissection in the distal left internal carotid  artery, just proximal to the petrous portion. Distal right internal carotid  artery is patent. Anterior and middle cerebral arteries are patent. . There is no  flow-limiting intracranial stenosis. There is no aneurysm. There are no sizable  posterior communicating arteries. Neck MRA: there is no flow-limiting stenosis. Bilateral common carotid arteries  are patent bilateral carotid bifurcations are patent. Distal left internal  carotid artery dissection is obscured by artifact. Right internal carotid artery  is patent. All measurements utilize NASCET criteria. Impression IMPRESSION:  1. 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 2. Moderate chronic microvascular ischemic disease. 3. Focal dissection of the distal left internal carotid artery proximal to the  petrous portion. 4. Limited MR angiography neck due to technique. Recommendation: CT angiography head and neck. I discussed this impression with BARRY Noriega at 1049 hours on 2/24/2019. VAS/US/Carotid Doppler Results (maximum last 3): No results found for this or any previous visit. PET Results (maximum last 3): No results found for this or any previous visit. 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 I have already contacted the neuro interventional nurse practitioner who is going to start the consult. I will place her on aspirin and Plavix in the meantime. Prefer not to anticoagulate given this is an intracranial dissection. We will defer to NIS changes as indicated. CTA has been ordered. Any acute changes please activate code stroke.   Discussed with primary team.        Tamiko Santos, DO  NEUROLOGIST  Diplomate ABPN  2/24/2019

## 2019-02-25 LAB — P2Y12 PLT RESPONSE,PPPR: 234 PRU (ref 194–418)

## 2019-02-25 PROCEDURE — 97530 THERAPEUTIC ACTIVITIES: CPT

## 2019-02-25 PROCEDURE — 97165 OT EVAL LOW COMPLEX 30 MIN: CPT

## 2019-02-25 PROCEDURE — 85576 BLOOD PLATELET AGGREGATION: CPT

## 2019-02-25 PROCEDURE — 65660000000 HC RM CCU STEPDOWN

## 2019-02-25 PROCEDURE — 74011250637 HC RX REV CODE- 250/637: Performed by: HOSPITALIST

## 2019-02-25 PROCEDURE — 92523 SPEECH SOUND LANG COMPREHEN: CPT | Performed by: SPEECH-LANGUAGE PATHOLOGIST

## 2019-02-25 PROCEDURE — 36415 COLL VENOUS BLD VENIPUNCTURE: CPT

## 2019-02-25 PROCEDURE — 74011250636 HC RX REV CODE- 250/636: Performed by: HOSPITALIST

## 2019-02-25 PROCEDURE — 97162 PT EVAL MOD COMPLEX 30 MIN: CPT

## 2019-02-25 PROCEDURE — 97535 SELF CARE MNGMENT TRAINING: CPT

## 2019-02-25 PROCEDURE — 74011250637 HC RX REV CODE- 250/637: Performed by: PSYCHIATRY & NEUROLOGY

## 2019-02-25 RX ORDER — CLOPIDOGREL BISULFATE 75 MG/1
150 TABLET ORAL DAILY
Status: DISCONTINUED | OUTPATIENT
Start: 2019-02-26 | End: 2019-02-28 | Stop reason: HOSPADM

## 2019-02-25 RX ORDER — MIDODRINE HYDROCHLORIDE 5 MG/1
2.5 TABLET ORAL
Status: DISCONTINUED | OUTPATIENT
Start: 2019-02-25 | End: 2019-02-27

## 2019-02-25 RX ORDER — CLOPIDOGREL BISULFATE 75 MG/1
75 TABLET ORAL
Status: COMPLETED | OUTPATIENT
Start: 2019-02-25 | End: 2019-02-25

## 2019-02-25 RX ADMIN — ASPIRIN 81 MG CHEWABLE TABLET 81 MG: 81 TABLET CHEWABLE at 08:30

## 2019-02-25 RX ADMIN — Medication 10 ML: at 21:01

## 2019-02-25 RX ADMIN — MIDODRINE HYDROCHLORIDE 2.5 MG: 5 TABLET ORAL at 11:49

## 2019-02-25 RX ADMIN — LOSARTAN POTASSIUM 50 MG: 50 TABLET ORAL at 08:30

## 2019-02-25 RX ADMIN — CLOPIDOGREL BISULFATE 75 MG: 75 TABLET, FILM COATED ORAL at 10:10

## 2019-02-25 RX ADMIN — CARBIDOPA AND LEVODOPA 1 TABLET: 25; 100 TABLET ORAL at 21:01

## 2019-02-25 RX ADMIN — CARBIDOPA AND LEVODOPA 1 TABLET: 25; 100 TABLET ORAL at 15:43

## 2019-02-25 RX ADMIN — CARBIDOPA AND LEVODOPA 1 TABLET: 25; 100 TABLET ORAL at 08:30

## 2019-02-25 RX ADMIN — SIMVASTATIN 20 MG: 20 TABLET, FILM COATED ORAL at 21:01

## 2019-02-25 RX ADMIN — POLYETHYLENE GLYCOL 3350 17 G: 17 POWDER, FOR SOLUTION ORAL at 08:30

## 2019-02-25 RX ADMIN — ENOXAPARIN SODIUM 40 MG: 40 INJECTION SUBCUTANEOUS at 21:01

## 2019-02-25 RX ADMIN — PANTOPRAZOLE SODIUM 40 MG: 40 TABLET, DELAYED RELEASE ORAL at 08:30

## 2019-02-25 RX ADMIN — Medication 10 ML: at 07:00

## 2019-02-25 RX ADMIN — CLOPIDOGREL 75 MG: 75 TABLET, FILM COATED ORAL at 08:30

## 2019-02-25 RX ADMIN — Medication 10 ML: at 15:42

## 2019-02-25 NOTE — PROGRESS NOTES
Speech LAnguage Pathology evaluation/discharge  Patient: Basilia Pierre (5 y.o. female)  Date: 2/25/2019  Primary Diagnosis: CVA (cerebral vascular accident) [facility]) [I63.9]       Precautions: fall       ASSESSMENT :  Based on the objective data described below, the patient presents with no aphasia, dysarthria, or apraxia. Integrated language not formally assessed but appeared intact in conversation. Patient mostly concerned with orthostatic hypotension and her safety for returning home. Swallowing not assessed but patient and Rn denied any issues at breakfast. STAND passed. Skilled acute therapy provided by a speech-language pathologist is not indicated at this time. PLAN :  Recommendations:  1. None  Discharge Recommendations: None     SUBJECTIVE:   Patient stated she had some speech issues initially but it has resolved. NAD.      OBJECTIVE:     Past Medical History:   Diagnosis Date    GERD (gastroesophageal reflux disease)     Heart failure ([facility] [address]     Hypertension      Past Surgical History:   Procedure Laterality Date    BREAST SURGERY PROCEDURE UNLISTED      l breast 2 cyst    HX GYN      hysterectomy    HX HEENT      catacacts removed     Prior Level of Function/Home Situation:   Home Situation  Home Environment: Private residence  One/Two Story Residence: One story  Living Alone: Yes  Support Systems: Child(tootie)  Patient Expects to be Discharged to[de-identified] Private residence  Current DME Used/Available at Home: None  Mental Status:  Neurologic State: Alert  Orientation Level: Oriented X4  Cognition: Appropriate decision making, Follows commands  Perception: Appears intact  Perseveration: No perseveration noted  Safety/Judgement: Awareness of environment, Insight into deficits  Motor Speech:  Oral-Motor Structure/Motor Speech  Face: No impairment  Labial: No impairment  Apraxic Characteristics: None  Dysarthric Characteristics: None  Overall Impairment Severity: None  Language Comprehension and Expression:  Auditory Comprehension  Auditory Impairment: No   Verbal Expression  Primary Mode of Expression: Verbal  Initiation: No impairment  Sentence Formulation: No impairment  Conversation: No impairment     The NOMS functional outcome measure was used to quantify this patient's level of expressive language impairment. Based on the NOMS, the patient was determined to be at level 7 for spoken language expression. NOMS Spoken Language Expression:  Level 1 (CN): Verbalizations not meaningful to anyone. Level 2 (CM): Few attempts accurate/appropriate. Max cues to elicit automatic/imitative words/phrases. Level 3 (CL): Communication partner responsible for communication; Mod cues for words/phrases meaningful in context  Level 4 (CK): Initiate during simple, routine activities w/familiar partner. Mod cues to produce simple sentences  Level 5 (Mau Kris): Initiates communication with familiar and unfamiliar partners. Min cues for complex sentences. Level 6 (CI): Communicates for most activities. Some limitations still present for vocational/social activities. Rarely cued for complex info  Level 7 [facility]): Independent communication. ABIMBOLA (2003). National Outcomes Measurement System (NOMS): Adult Speech-Language Pathology User's Guide. Pain:  Pain Scale 1: Numeric (0 - 10)  Pain Intensity 1: 0     After treatment:   []   Patient left in no apparent distress sitting up in chair  [x]   Patient left in no apparent distress in bed  [x]   Call bell left within reach  [x]   Nursing notified  []   Caregiver present  []   Bed alarm activated    COMMUNICATION/EDUCATION:   The patients plan of care including findings and recommendations was discussed with: Registered Nurse. Patient was educated regarding Her deficit(s) of aphasia as this relates to Her diagnosis of CVA. She demonstrated Excellent understanding as evidenced by verbalization.   [x]   Patient/family have participated as able and agree with findings and recommendations. []   Patient is unable to participate in plan of care at this time. Thank you for this referral.  Rl Mills.  Jewel Magallon MS, CCC-SLP, BCS-S  Time Calculation: 10 mins

## 2019-02-25 NOTE — PROGRESS NOTES
Physical Therapy Note  2/25/19    Orthostatic Assessment     02/25/19 0910 02/25/19 0923 02/25/19 0926   Vital Signs   Pulse (Heart Rate) 76 83 75   /59 116/65 (!) 96/27   MAP (Calculated) 86 82 (!) 50   BP 1 Location Right arm Right arm Right arm   BP 1 Method Automatic Automatic Automatic   BP Patient Position At rest;Head of bed elevated (Comment degrees) Sitting Standing     Symptomatic with c/o nausea and dizziness.     Kim Vallejo, PT, DPT

## 2019-02-25 NOTE — PROGRESS NOTES
Problem: Falls - Risk of  Goal: *Absence of Falls  Document Ana Fall Risk and appropriate interventions in the flowsheet.   Outcome: Progressing Towards Goal  Fall Risk Interventions:  Mobility Interventions: Communicate number of staff needed for ambulation/transfer, OT consult for ADLs, Patient to call before getting OOB, PT Consult for assist device competence, PT Consult for mobility concerns         Medication Interventions: Assess postural VS orthostatic hypotension, Evaluate medications/consider consulting pharmacy, Patient to call before getting OOB, Teach patient to arise slowly    Elimination Interventions: Call light in reach, Toileting schedule/hourly rounds, Toilet paper/wipes in reach

## 2019-02-25 NOTE — CONSULTS
Neurology Progress Note    Patient ID:  Cely Adler  [id number]  80 y.o.  1935    Chief Complaint: Right arm weakness    Subjective:     69-year-old woman who presented with right arm weakness and speech change. She is feeling improvement since admission, but still weak in the right arm. 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 Neuro interventional evaluated her. No intervention indicated. This morning her platelet assay is subtherapeutic. Lipid panel is still pending. She still gets very hypotensive on sitting and standing. Objective:       ROS:  Per HPI  All other 12 pt ROS negative    Meds:  Current Facility-Administered Medications   Medication Dose Route Frequency    [START ON 2/26/2019] clopidogrel (PLAVIX) tablet 150 mg  150 mg Oral DAILY    0.9% sodium chloride infusion  75 mL/hr IntraVENous CONTINUOUS    polyethylene glycol (MIRALAX) packet 17 g  17 g Oral DAILY    sodium chloride (NS) flush 5-40 mL  5-40 mL IntraVENous Q8H    sodium chloride (NS) flush 5-40 mL  5-40 mL IntraVENous PRN    acetaminophen (TYLENOL) tablet 650 mg  650 mg Oral Q4H PRN    Or    acetaminophen (TYLENOL) solution 650 mg  650 mg Per NG tube Q4H PRN    Or    acetaminophen (TYLENOL) suppository 650 mg  650 mg Rectal Q4H PRN    aspirin chewable tablet 81 mg  81 mg Oral DAILY    labetalol (NORMODYNE;TRANDATE) 20 mg/4 mL (5 mg/mL) injection 5 mg  5 mg IntraVENous Q10MIN PRN    enoxaparin (LOVENOX) injection 40 mg  40 mg SubCUTAneous Q24H    carbidopa-levodopa (SINEMET)  mg per tablet 1 Tab  1 Tab Oral TID    pantoprazole (PROTONIX) tablet 40 mg  40 mg Oral DAILY    losartan (COZAAR) tablet 50 mg  50 mg Oral DAILY    simvastatin (ZOCOR) tablet 20 mg  20 mg Oral QHS       MRI Results (maximum last 3):   Results from [place] encounter on 02/23/19   MRI BRAIN WO CONT    Narrative EXAM:  MRI BRAIN WO CONT, MRA NECK WO CONT, MRA BRAIN WO CONT    INDICATION:  Right upper extremity weakness. Intermittent dizziness. Parkinson's  disease. COMPARISON: None. TECHNIQUE: Multisequence, multiplanar MRI of the brain without contrast.  Noncontrast time of flight MR angiography of the head. Noncontrast  time-of-flight MR angiography of the neck. (3 separate studies reported  together) Maximum intensity projection reformats of the MR angiography. Examination  performed on the 3 Parul magnet. FINDINGS: MRI Salo Strickland: Multiple small areas of restricted diffusion in the  posterior left MCA territory involving the left parietal lobe and postcentral  gyrus. There is associated hyperintense T2/flair signal in these areas of  restricted diffusion. No other restricted diffusion. Moderate chronic microvascular ischemic disease in the cerebral white matter. No hydrocephalus. No mass effect or midline shift. No extra-axial fluid  collection. The midline structures, including the cervicomedullary junction, are within  normal limits. Brain MRA:  The vertebral arteries are codominant. The basilar artery and its  branches are normal. There is dissection in the distal left internal carotid  artery, just proximal to the petrous portion. Distal right internal carotid  artery is patent. Anterior and middle cerebral arteries are patent. . There is no  flow-limiting intracranial stenosis. There is no aneurysm. There are no sizable  posterior communicating arteries. Neck MRA: there is no flow-limiting stenosis. Bilateral common carotid arteries  are patent bilateral carotid bifurcations are patent. Distal left internal  carotid artery dissection is obscured by artifact. Right internal carotid artery  is patent. All measurements utilize NASCET criteria. Impression IMPRESSION:  1. 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 2. Moderate chronic microvascular ischemic disease.   3. Focal dissection of the distal left internal carotid artery proximal to the  petrous portion. 4. Limited MR angiography neck due to technique. Recommendation: CT angiography head and neck. I discussed this impression with BARRY Claudio at 1049 hours on 2/24/2019. MRA BRAIN WO CONT    Narrative EXAM:  MRI BRAIN WO CONT, MRA NECK WO CONT, MRA BRAIN WO CONT    INDICATION:  Right upper extremity weakness. Intermittent dizziness. Parkinson's  disease. COMPARISON: None. TECHNIQUE: Multisequence, multiplanar MRI of the brain without contrast.  Noncontrast time of flight MR angiography of the head. Noncontrast  time-of-flight MR angiography of the neck. (3 separate studies reported  together) Maximum intensity projection reformats of the MR angiography. Examination  performed on the 3 Parul magnet. FINDINGS: MRI Luisana Cluck: Multiple small areas of restricted diffusion in the  posterior left MCA territory involving the left parietal lobe and postcentral  gyrus. There is associated hyperintense T2/flair signal in these areas of  restricted diffusion. No other restricted diffusion. Moderate chronic microvascular ischemic disease in the cerebral white matter. No hydrocephalus. No mass effect or midline shift. No extra-axial fluid  collection. The midline structures, including the cervicomedullary junction, are within  normal limits. Brain MRA:  The vertebral arteries are codominant. The basilar artery and its  branches are normal. There is dissection in the distal left internal carotid  artery, just proximal to the petrous portion. Distal right internal carotid  artery is patent. Anterior and middle cerebral arteries are patent. . There is no  flow-limiting intracranial stenosis. There is no aneurysm. There are no sizable  posterior communicating arteries. Neck MRA: there is no flow-limiting stenosis. Bilateral common carotid arteries  are patent bilateral carotid bifurcations are patent. Distal left internal  carotid artery dissection is obscured by artifact. Right internal carotid artery  is patent. All measurements utilize NASCET criteria. Impression IMPRESSION:  1. 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 2. Moderate chronic microvascular ischemic disease. 3. Focal dissection of the distal left internal carotid artery proximal to the  petrous portion. 4. Limited MR angiography neck due to technique. Recommendation: CT angiography head and neck. I discussed this impression with BARRY Almaraz at 1049 hours on 2/24/2019. MRA NECK WO CONT    Narrative EXAM:  MRI BRAIN WO CONT, MRA NECK WO CONT, MRA BRAIN WO CONT    INDICATION:  Right upper extremity weakness. Intermittent dizziness. Parkinson's  disease. COMPARISON: None. TECHNIQUE: Multisequence, multiplanar MRI of the brain without contrast.  Noncontrast time of flight MR angiography of the head. Noncontrast  time-of-flight MR angiography of the neck. (3 separate studies reported  together) Maximum intensity projection reformats of the MR angiography. Examination  performed on the 3 Parul magnet. FINDINGS: MRI Mel Bradych: Multiple small areas of restricted diffusion in the  posterior left MCA territory involving the left parietal lobe and postcentral  gyrus. There is associated hyperintense T2/flair signal in these areas of  restricted diffusion. No other restricted diffusion. Moderate chronic microvascular ischemic disease in the cerebral white matter. No hydrocephalus. No mass effect or midline shift. No extra-axial fluid  collection. The midline structures, including the cervicomedullary junction, are within  normal limits. Brain MRA:  The vertebral arteries are codominant. The basilar artery and its  branches are normal. There is dissection in the distal left internal carotid  artery, just proximal to the petrous portion. Distal right internal carotid  artery is patent. Anterior and middle cerebral arteries are patent. Artist Hazy There is no  flow-limiting intracranial stenosis. There is no aneurysm. There are no sizable  posterior communicating arteries. Neck MRA: there is no flow-limiting stenosis. Bilateral common carotid arteries  are patent bilateral carotid bifurcations are patent. Distal left internal  carotid artery dissection is obscured by artifact. Right internal carotid artery  is patent. All measurements utilize NASCET criteria. Impression IMPRESSION:  1. 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 2. Moderate chronic microvascular ischemic disease. 3. Focal dissection of the distal left internal carotid artery proximal to the  petrous portion. 4. Limited MR angiography neck due to technique. Recommendation: CT angiography head and neck. I discussed this impression with BARRY Claudio at 1049 hours on 2/24/2019. Lab Review   Recent Results (from the past 24 hour(s))   PLATELET FUNCTION, VERIFY NOW P2Y12    Collection Time: 02/25/19  1:40 AM   Result Value Ref Range    P2Y12 Plt response 234 194 - 418 PRU       Additional comments:I reviewed the patient's new clinical lab test results. and I reviewed the patients new imaging test results. Patient Vitals for the past 8 hrs:   BP Temp Pulse Resp SpO2   02/25/19 1000 116/47 97.8 °F (36.6 °C) 73 14 98 %   02/25/19 0926 (!) 96/27 -- 75 -- --   02/25/19 0923 116/65 -- 83 -- --   02/25/19 0910 139/59 -- 76 -- --   02/25/19 0830 159/74 -- 74 -- --   02/25/19 0615 146/67 97.6 °F (36.4 °C) 71 14 --       No intake/output data recorded. No intake/output data recorded. Exam:  Visit Vitals  /47 (BP 1 Location: Right arm, BP Patient Position: At rest)   Pulse 73   Temp 97.8 °F (36.6 °C)   Resp 14   Ht 5' 1\" (1.549 m)   Wt 77.5 kg (170 lb 13.7 oz)   SpO2 98%   Breastfeeding?  No   BMI 32.28 kg/m²     Gen: Well developed  CV: RRR  Lungs: non labored breathing  Abd: soft, non distended  Neuro: A&O x 3, mild slurred speech. CN II-XII: PERRL, EOMI, face asymmetric, tongue/palate midline, reduced blink rate and masklike face  Motor: Right arm 4+ left arm 5 lower extremities 4+ symmetric,   Sensory: Grossly intact to LT  DTRs: symmetric  COOR: no limb/truncal ataxia  Gait: Deferred due to condition    PROBLEM LIST:     Patient Active Problem List   Diagnosis Code    Cerebrovascular accident (CVA) due to embolism of left carotid artery ([facility] [address] R63.716    Internal carotid artery dissection (HCC) I77.71       Assessment/Plan:      77-year-old woman who has had a stroke that likely resulted from embolization from her left ICA dissection. No intervention deemed necessary. She needs to remain on aspirin and Plavix. This morning her Plavix assay is subtherapeutic so I increased it to 150 mg daily in addition to aspirin 81 mg.  Recheck level tomorrow morning. She also continues to be heavily orthostatic and cannot participate with PT because of it. I am going to give her low-dose Midrin as a trial.  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 Lipid panel pending. Continue current statin as it is. Following.       Signed:  [address], DO  2/25/2019  10:30 AM

## 2019-02-25 NOTE — PROGRESS NOTES
Problem: Mobility Impaired (Adult and Pediatric)  Goal: *Acute Goals and Plan of Care (Insert Text)  Physical Therapy Goals  Initiated 2/25/2019  1. Patient will move from supine to sit and sit to supine  and scoot up and down in bed with independence within 7 day(s). 2.  Patient will transfer from bed to chair and chair to bed with modified independence using the least restrictive device within 7 day(s). 3.  Patient will perform sit to stand with modified independence within 7 day(s). 4.  Patient will ambulate with modified independence for 300 feet with the least restrictive device within 7 day(s). 5.  Patient will ascend/descend 14 stairs with handrail(s) with modified independence within 7 day(s). 6.  Patient will improve Jc Balance score by 7 points within 7 days. physical Therapy EVALUATION- neuro population    Patient: Jennifer Montemayor (13 y.o. female)  Date: 2/25/2019  Primary Diagnosis: CVA (cerebral vascular accident) [facility]) [I63.9]       Precautions: Other (comment)(orthostatic hypotension)    ASSESSMENT :  Based on the objective data described below, the patient presents with impaired functional mobility as compared to baseline level 2* R UE > LE weakness, impaired R UE sensation, impaired gross motor coordination, impaired balance, and +orthostatic hypotension following admission for acute CVA. Work up also revealing of distal ICA dissection - no interventions planned at this time. Prior to this admission, pt reports that she lived at home alone and was indep with all ADLs and mobility w/o use of AD. Denies recent falls and reports long standing hx of difficulty managing BPs. Today, she was able to complete bed mobility, sit<>stands, and take several side steps up towards [facility] with CGA for safety without major LOB, difficulty, or knee buckle observed.  Further balance and ambulation assessment/progression limited this date by significant symptomatic orthostatic hypotension (see note) that did not improve with time and additional activity for BP support. May benefit from use of compression stockings and/or abdominal binder. . Full discharge recommendations pending further mobility assessment once able. .     Patient will benefit from skilled intervention to address the above impairments. Patients rehabilitation potential is considered to be Good  Factors which may influence rehabilitation potential include:   []           None noted  []           Mental ability/status  [x]           Medical condition  []           Home/family situation and support systems  []           Safety awareness  []           Pain tolerance/management  []           Other:      PLAN :  Recommendations and Planned Interventions:  [x]             Bed Mobility Training             [x]      Neuromuscular Re-Education  [x]             Transfer Training                   []      Orthotic/Prosthetic Training  [x]             Gait Training                         []      Modalities  [x]             Therapeutic Exercises           []      Edema Management/Control  [x]             Therapeutic Activities            [x]      Patient and Family Training/Education  []             Other (comment):  Frequency/Duration: Patient will be followed by physical therapy 5 times a week to address goals. Discharge Recommendations: Rehab, Home Health and To Be Determined  Further Equipment Recommendations for Discharge: TBD     SUBJECTIVE:   Patient stated I feel nauseous.     OBJECTIVE DATA SUMMARY:   HISTORY:    Past Medical History:   Diagnosis Date    GERD (gastroesophageal reflux disease)     Heart failure ([facility] [address]     Hypertension      Past Surgical History:   Procedure Laterality Date    BREAST SURGERY PROCEDURE UNLISTED      l breast 2 cyst    HX GYN      hysterectomy    HX HEENT      catacacts removed     Prior Level of Function/Home Situation: lives at home alone in a 2 story house; indep with BADLs and ambulation without AD  Personal factors and/or comorbidities impacting plan of care: PMHx    Home Situation  Home Environment: Private residence  # Steps to Enter: 3  Rails to Enter: Yes  One/Two Story Residence: Two story  # of Interior Steps: 14  Living Alone: Yes  Support Systems: Family member(s)  Patient Expects to be Discharged to[de-identified] Private residence  Current DME Used/Available at Home: [place]    EXAMINATION/PRESENTATION/DECISION MAKING:   Critical Behavior:  Neurologic State: Alert  Orientation Level: Oriented X4  Cognition: Appropriate decision making, Follows commands  Safety/Judgement: Awareness of environment, Insight into deficits  Hearing: Auditory  Auditory Impairment: None  Skin:  Intact where exposed  Edema: none noted  Range Of Motion:  AROM: Generally decreased, functional  PROM: Within functional limits      Strength:    Strength: Generally decreased, functional(R UE < LE)      Tone & Sensation:      Sensation: Impaired(reports diminished to LT R UE)      Coordination:  Coordination: Generally decreased, functional  Vision:   Tracking: Able to track stimulus in all quadrants w/o difficulty  Diplopia: No(but reports hx of intermittent diplopia)  Functional Mobility:  Bed Mobility:   Supine to Sit: Contact guard assistance  Sit to Supine: Contact guard assistance     Transfers:  Sit to Stand: Contact guard assistance  Stand to Sit: Contact guard assistance       Balance:   Sitting: Intact  Standing: Impaired; Without support  Standing - Static: Good  Standing - Dynamic : Fair    Functional Measure  Jc Balance Test:    Sitting to Standin  Standing Unsupported: 2  Sitting with Back Unsupported: 3  Standing to Sitting: 3  Transfers: 1  Standing Unsupported with Eyes Closed: 0  Standing Unsupported with Feet Together: 0  Reach Forward with Outstretched Arm: 0   Object: 0  Turn to Look Over Shoulders: 0  Turn 360 Degrees: 0  Alternate Foot on Step/Stool: 0  Standing Unsupported One Foot in Front: 0  Stand on One Le  Total: 10         56=Maximum possible score;   0-20=High fall risk  21-40=Moderate fall risk   41-56=Low fall risk       Physical Therapy Evaluation Charge Determination   History Examination Presentation Decision-Making   MEDIUM  Complexity : 1-2 comorbidities / personal factors will impact the outcome/ POC  MEDIUM Complexity : 3 Standardized tests and measures addressing body structure, function, activity limitation and / or participation in recreation  MEDIUM Complexity : Evolving with changing characteristics  HIGH Complexity : FOTO score of 1- 25       Based on the above components, the patient evaluation is determined to be of the following complexity level: MEDIUM    Therapeutic Exercises:     Pain:  Pain Scale 1: Numeric (0 - 10)  Pain Intensity 1: 0     Activity Tolerance:   Significant orthostatic hypotension - see note    Please refer to the flowsheet for vital signs taken during this treatment. After treatment:   []     Patient left in no apparent distress sitting up in chair  [x]     Patient left in no apparent distress in bed  [x]     Call bell left within reach  [x]     Nursing notified  []     Caregiver present  []     Bed alarm activated    COMMUNICATION/EDUCATION:   The patients plan of care was discussed with: Occupational Therapist, Registered Nurse and Physician. Patient was educated regarding her deficit(s) of R UE weakness as this relates to her diagnosis of CVA. She demonstrated Good understanding as evidenced by nodding. Patient and/or family was verbally educated on the BE FAST acronym for signs/symptoms of CVA and TIA. BE FAST was written on patient's communication board  for visual education and reinforcement. All questions answered with patient indicating good understanding. [x]  Fall prevention education was provided and the patient/caregiver indicated understanding. [x]  Patient/family have participated as able in goal setting and plan of care.   [x] Patient/family agree to work toward stated goals and plan of care. []  Patient understands intent and goals of therapy, but is neutral about his/her participation. []  Patient is unable to participate in goal setting and plan of care.     Thank you for this referral.  Eileen Ghotra, PT , DPT   Time Calculation: 27 mins

## 2019-02-25 NOTE — PROGRESS NOTES
Physical Therapy Note  2/25/19    Orders received, chart reviewed and patient evaluated by physical therapy. Recommend patient to discharge to rehab vs home with assistance and HHPT pending progress with skilled acute care physical therapy. Mobility limited this date 2* significant, symptomatic orthostatic hypotension. Full evaluation to follow.      Julienne Teresa, PT, DPT

## 2019-02-25 NOTE — PROGRESS NOTES
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 And assoc:Late acute versus subacute left MCA territory infarcts involve the left  parietal lobe and sensory cortex. . Cont w  Rt upper ext weakness,numbness. :  Pt is weak, low bp with up out of bed activity / pt dizzy then gets better. suppect given her PD,and acute CVA she best go inpt rehab,   PT /Ot to rework w pt tomorrow. 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 And assoc:Late acute versus subacute left MCA territory infarcts involve the left parietal lobe and sensory cortex. . Cont w  Rt upper ext weakness,numbness. Subtherapeutic Plavix at 75 mg , dose increased to 150 mg a day. Distal lt ICA dissection, for asa/plavix     Postural hypotension, ivf bolus and maintenance  ; given acute CVA/chronic PD  needs Rehab. Code status: full   DVT prophylaxis: Lovenox     Care Plan discussed with: Patient/Family, Nurse and Consultant Dr. Amy Pisano   Disposition: TBD     Hospital Problems  Never Reviewed          Codes Class Noted POA    Internal carotid artery dissection [facility]) ICD-10-CM: I77.71  ICD-9-CM: 443.21  2019 Unknown        * (Principal) Cerebrovascular accident (CVA) due to embolism of left carotid artery ([facility] [address] ICD-10-CM: B50.413  ICD-9-CM: 434.11  2019 Yes                Review of Systems:   Pertinent items are noted in HPI. Vital Signs:    Last 24hrs VS reviewed since prior progress note.  Most recent are:  Visit Vitals  /47 (BP 1 Location: Right arm, BP Patient Position: At rest)   Pulse 73   Temp 97.8 °F (36.6 °C)   Resp 14   Ht 5' 1\" (1.549 m)   Wt 77.5 kg (170 lb 13.7 oz)   SpO2 98%   Breastfeeding? No   BMI 32.28 kg/m²       No intake or output data in the 24 hours ending 02/25/19 1226     Physical Examination:             Constitutional:  No acute distress, cooperative, pleasant    ENT:  Oral mucous moist, oropharynx benign. Neck supple,    Resp:  CTA bilaterally. No wheezing/rhonchi/rales. No accessory muscle use   CV:  Regular rhythm, normal rate, no murmurs, gallops, rubs    GI:  Soft, non distended, non tender. normoactive bowel sounds, no hepatosplenomegaly     Musculoskeletal:  No edema, warm, 2+ pulses throughout    Neurologic:  Moves all extremities. Rt UP 2-3/5  AAOx3, CN II-XII reviewed     Psych:  Good insight, Not anxious nor agitated. Skin:  Good turgor, no rashes or ulcers       Data Review:    Review and/or order of clinical lab test      Labs:     Recent Labs     02/23/19  1238   WBC 3.7   HGB 11.9   HCT 36.8        Recent Labs     02/23/19  1238      K 4.1      CO2 29   BUN 17   CREA 0.92   GLU 92   CA 8.9     Recent Labs     02/23/19  1238   SGOT 16   ALT 8*   AP 54   TBILI 0.4   TP 7.0   ALB 3.7   GLOB 3.3     Recent Labs     02/23/19  1238   INR 1.0   PTP 10.0      No results for input(s): FE, TIBC, PSAT, FERR in the last 72 hours. No results found for: FOL, RBCF   No results for input(s): PH, PCO2, PO2 in the last 72 hours.   Recent Labs     02/23/19  1238      TROIQ <0.05     No results found for: CHOL, CHOLX, CHLST, CHOLV, HDL, LDL, LDLC, DLDLP, TGLX, TRIGL, TRIGP, CHHD, CHHDX  Lab Results   Component Value Date/Time    Glucose (POC) 97 02/23/2019 12:11 PM     Lab Results   Component Value Date/Time    Color YELLOW/STRAW 02/23/2019 02:03 PM    Appearance CLEAR 02/23/2019 02:03 PM    Specific gravity 1.010 02/23/2019 02:03 PM    pH (UA) 7.0 02/23/2019 02:03 PM    Protein NEGATIVE  02/23/2019 02:03 PM    Glucose NEGATIVE  02/23/2019 02:03 PM    Ketone NEGATIVE  02/23/2019 02:03 PM    Bilirubin NEGATIVE  02/23/2019 02:03 PM    Urobilinogen 0.2 02/23/2019 02:03 PM    Nitrites NEGATIVE  02/23/2019 02:03 PM    Leukocyte Esterase SMALL (A) 02/23/2019 02:03 PM    Epithelial cells FEW 02/23/2019 02:03 PM    Bacteria NEGATIVE  02/23/2019 02:03 PM    WBC 0-4 02/23/2019 02:03 PM    RBC 0-5 02/23/2019 02:03 PM         Medications Reviewed:     Current Facility-Administered Medications   Medication Dose Route Frequency    [START ON 2/26/2019] clopidogrel (PLAVIX) tablet 150 mg  150 mg Oral DAILY    midodrine (PROAMITINE) tablet 2.5 mg  2.5 mg Oral ACB    0.9% sodium chloride infusion  75 mL/hr IntraVENous CONTINUOUS    polyethylene glycol (MIRALAX) packet 17 g  17 g Oral DAILY    sodium chloride (NS) flush 5-40 mL  5-40 mL IntraVENous Q8H    sodium chloride (NS) flush 5-40 mL  5-40 mL IntraVENous PRN    acetaminophen (TYLENOL) tablet 650 mg  650 mg Oral Q4H PRN    Or    acetaminophen (TYLENOL) solution 650 mg  650 mg Per NG tube Q4H PRN    Or    acetaminophen (TYLENOL) suppository 650 mg  650 mg Rectal Q4H PRN    aspirin chewable tablet 81 mg  81 mg Oral DAILY    labetalol (NORMODYNE;TRANDATE) 20 mg/4 mL (5 mg/mL) injection 5 mg  5 mg IntraVENous Q10MIN PRN    enoxaparin (LOVENOX) injection 40 mg  40 mg SubCUTAneous Q24H    carbidopa-levodopa (SINEMET)  mg per tablet 1 Tab  1 Tab Oral TID    pantoprazole (PROTONIX) tablet 40 mg  40 mg Oral DAILY    losartan (COZAAR) tablet 50 mg  50 mg Oral DAILY    simvastatin (ZOCOR) tablet 20 mg  20 mg Oral QHS     ______________________________________________________________________  EXPECTED LENGTH OF STAY: 4d 9h  ACTUAL LENGTH OF STAY:          2                 Urvashi Alonso MD

## 2019-02-25 NOTE — PROGRESS NOTES
Bedside shift change report given to John (oncoming nurse) by Collin Alba (offgoing nurse). Report included the following information SBAR, Kardex, Procedure Summary, MAR, Accordion, Recent Results and Cardiac Rhythm NSR.

## 2019-02-25 NOTE — PROGRESS NOTES
Problem: Self Care Deficits Care Plan (Adult)  Goal: *Acute Goals and Plan of Care (Insert Text)  Occupational Therapy Goals  Initiated 2/25/2019   1. Patient will perform grooming sitting EOB with supervision/set-up within 7 day(s). 2.  Patient will perform upper body dressing with supervision/set-up within 7 day(s). 3.  Patient will perform lower body dressing with supervision/ set-up within 7 day(s). 4.  Patient will perform toilet transfers with supervision/ set-up within 7 day(s). 5.  Patient will perform all aspects of toileting with supervision/set-up within 7 day(s). 6.  Patient will participate in upper extremity therapeutic exercise/activities with supervision/set-up for 10 minutes within 7 day(s). 7.  Patient will utilize energy conservation techniques during functional activities with verbal cues within 7 day(s). 8.  Patient will improve their Fugl Garcia score by 5 points in prep for ADLs within 7 days. Occupational Therapy EVALUATION  Patient: Az Chaudhry (16 y.o. female)  Date: 2/25/2019  Primary Diagnosis: CVA (cerebral vascular accident) [facility]) [I63.9]       Precautions: Other (comment)(orthostatic hypotension)    ASSESSMENT :  Based on the objective data described below, the patient presents with mild RUE weakness/ impaired coordination/ AROM (scored 58/66 on Fugl Garcia UE motor assessment), orthostatic hypotension with symptomatic dizziness and visual spots (see vitals below), and impaired standing balance s/p admission for L ICA dissection/ L MCA CVA. Patient currently requires overall set-up to max-A for ADLs, CGA for supine-sit, CGA for sit-stand, and mod-A for sit-supine with fatigue. Session very limited by dizziness standing/ impaired activity tolerance. 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      Discharge recommendation: Patient is significantly below functional baseline. Recommend d/c to rehab at this time, level TBD pending activity tolerance/ ability to tolerate 3 hours therapy/ day. Patient will benefit from skilled intervention to address the above impairments. Patients rehabilitation potential is considered to be Good  Factors which may influence rehabilitation potential include:   [x]                None noted  []                Mental ability/status  []                Medical condition  []                Home/family situation and support systems  []                Safety awareness  []                Pain tolerance/management  []                Other:      PLAN :  Recommendations and Planned Interventions:  [x]                  Self Care Training                  [x]           Therapeutic Activities  [x]                  Functional Mobility Training    []           Cognitive Retraining  [x]                  Therapeutic Exercises           [x]           Endurance Activities  [x]                  Balance Training                   []           Neuromuscular Re-Education  []                  Visual/Perceptual Training     [x]      Home Safety Training  [x]                  Patient Education                 [x]           Family Training/Education  []                  Other (comment):    Frequency/Duration: Patient will be followed by occupational therapy 5 times a week to address goals. Discharge Recommendations: Rehab, level TBD pending progress/ activity tolerance   Further Equipment Recommendations for Discharge: TBD     SUBJECTIVE:   Patient stated I feel very dizzy.     OBJECTIVE DATA SUMMARY:   HISTORY:   Past Medical History:   Diagnosis Date    GERD (gastroesophageal reflux disease)     Heart failure ([facility] [address]     Hypertension      Past Surgical History:   Procedure Laterality Date    BREAST SURGERY PROCEDURE UNLISTED      l breast 2 cyst    HX GYN      hysterectomy    HX HEENT      catacacts removed       Prior Level of Function/Environment/Context: see above  Expanded or extensive additional review of patient history:     Home Situation  Home Environment: Private residence  # Steps to Enter: 3  Rails to Enter: Yes  One/Two Story Residence: Two story  # of Interior Steps: 14  Living Alone: Yes  Support Systems: Family member(s)  Patient Expects to be Discharged to[de-identified] Private residence  Current DME Used/Available at Home: Cane, straight  Hand dominance: Right    EXAMINATION OF PERFORMANCE DEFICITS:  Cognitive/Behavioral Status:  Neurologic State: Alert  Orientation Level: Oriented X4  Cognition: Appropriate decision making; Appropriate for age attention/concentration; Appropriate safety awareness; Follows commands  Perception: Appears intact  Perseveration: No perseveration noted  Safety/Judgement: Awareness of environment; Insight into deficits    Skin: visible skin appears intact    Edema: none noted    Hearing: Auditory  Auditory Impairment: None    Vision/Perceptual:    Tracking: Able to track stimulus in all quadrants w/o difficulty              Visual Fields: (able to detect stimuli in all fields)  Diplopia: No    Acuity: Within Defined Limits;Able to read clock/calendar on wall without difficulty; Able to read employee name badge without difficulty    Corrective Lenses: Reading glasses    Range of Motion:    AROM: Generally decreased, functional  PROM: Within functional limits                      Strength:    Strength: Generally decreased, functional                Coordination:  Coordination: Generally decreased, functional  Fine Motor Skills-Upper: Right Impaired;Left Impaired    Gross Motor Skills-Upper: Left Impaired;Right Impaired    Tone & Sensation:    Tone: Normal  Sensation: (reports RUE intact, \"pins\" in BLE at BL but intact to LT)                      Balance:  Sitting: Intact  Standing: Impaired  Standing - Static: Good  Standing - Dynamic : Fair    Functional Mobility and Transfers for ADLs:  Bed Mobility:  Supine to Sit: Contact guard assistance; Additional time  Sit to Supine: Moderate assistance(for lifting BLE into bed, fatigued after standing)    Transfers:  Sit to Stand: Contact guard assistance           ADL Assessment:  Feeding: Setup(inferred)    Oral Facial Hygiene/Grooming: Setup(combed hair using RUE)    Bathing: Moderate assistance(inferred due to dizziness/ impaired LB access)    Upper Body Dressing: Minimum assistance(inferred due to AROM, coordination)    Lower Body Dressing: Maximum assistance(inferred due to LB access, dizziness, activity tolerance)    Toileting: Maximum assistance(inferred)                ADL Intervention and task modifications:                 Cognitive Retraining  Safety/Judgement: Awareness of environment; Insight into deficits    Functional Measure:   Fugl-Garcai Assessment of Motor Recovery after Stroke:     Reflex Activity  Flexors/Biceps/Fingers: Can be elicited  Extensors/Triceps: Can be elicited  Reflex Subtotal: 4    Volitional Movement Within Synergies  Shoulder Retraction: Full  Shoulder Elevation: Full  Shoulder Abduction (90 degrees): Full  Shoulder External Rotation: Full  Elbow Flexion: Full  Forearm Supination: Full  Shoulder Adduction/Internal Rotation: Full  Elbow Extension: Full  Forearm Pronation: Full  Subtotal: 18    Volitional Movement Mixing Synergies  Hand to Lumbar Spine: Full  Shoulder Flexion (0-90 degrees): Full  Pronation-Supination: Full  Subtotal: 6    Volitional Movement With Little or No Synergy  Shoulder Abduction (0-90 degrees): Full  Shoulder Flexion ( degrees): Partial  Pronation/Supination: Full  Subtotal : 5    Normal Reflex Activity  Biceps, Triceps, Finger Flexors:  Full  Subtotal : 2    Upper Extremity Total   Upper Extremity Total: 35    Wrist  Stability at 15 Degree Dorsiflexion: Partial  Repeated Dorsiflexion/ Volar Flexion: Partial  Stability at 15 Degree Dorsiflexion: Partial  Repeated Dorsiflexion/ Volar Flexion: Partial  Circumduction: Partial  Wrist Total: 5    Hand  Mass Flexion: Full  Mass Extension: Full  Grasp A: Full  Grasp B: Full  Grasp C: Full  Grasp D: Full  Grasp E: Full  Hand Total: 14    Coordination/Speed  Tremor: None  Dysmetria: Slight  Time: 2-5s  Coordination/Speed Total : 4    Total A-D  Total A-D (Motor Function): 58/66       This is a reliable/valid measure of arm function after a neurological event. It has established value to characterize functional status and for measuring spontaneous and therapy-induced recovery; tests proximal and distal motor functions. Fugl-Garcia Assessment - UE scores recorded between five and 30 days post neurologic event can be used to predict UE recovery at six months post neurologic event. Severe = 0-21 points   Moderately Severe = 22-33 points   Moderate = 34-47 points   Mild = 48-66 points  CLAY Weiss, CALIXTO Fountain, & SILVA Gonzalez (1992). Measurement of motor recovery after stroke: Outcome assessment and sample size requirements.  Stroke, 23, pp. 7815-8408.   ------------------------------------------------------------------------------------------------------------------------------------------------------------------  MCID:  Stroke:   Kermit Fuchs et al, 2001; n = 171; mean age 79 (5) years; assessed within 16 (12) days of stroke, Acute Stroke)  FMA Motor Scores from Admission to Discharge   10 point increase in FMA Upper Extremity = 1.5 change in discharge FIM   10 point increase in FMA Lower Extremity = 1.9 change in discharge FIM  MDC:   Stroke:   Eugenia Reyez al, 2008, n = 14, mean age = 59.9 (14.6) years, assessed on average 14 (6.5) months post stroke, Chronic Stroke)   FMA = 5.2 points for the Upper Extremity portion of the assessment       Occupational Therapy Evaluation Charge Determination   History Examination Decision-Making   LOW Complexity : Brief history review  MEDIUM Complexity : 3-5 performance deficits relating to physical, cognitive , or psychosocial skils that result in activity limitations and / or participation restrictions MEDIUM Complexity : Patient may present with comorbidities that affect occupational performnce. Miniml to moderate modification of tasks or assistance (eg, physical or verbal ) with assesment(s) is necessary to enable patient to complete evaluation       Based on the above components, the patient evaluation is determined to be of the following complexity level: LOW     Pain:  Pain Scale 1: Numeric (0 - 10)  Pain Intensity 1: 0              Activity Tolerance:   BP supine with HOB raised: 163/59 (HR 75)  BP sitting EOB: 158/63  BP standin/54 (HR 89) reports dizziness, visual spots, not improving with standing marches. Standing tolerance ~ 1 min. RN present and aware    Please refer to the flowsheet for vital signs taken during this treatment. After treatment:   []  Patient left in no apparent distress sitting up in chair  [x]  Patient left in no apparent distress in bed  [x]  Call bell left within reach  [x]  Nursing notified  []  Caregiver present  []  Bed alarm activated    COMMUNICATION/EDUCATION:   The patients plan of care was discussed with: Physical Therapist and Registered Nurse. [x]      Home safety education was provided and the patient/caregiver indicated understanding. [x]      Patient/family have participated as able and agree with findings and recommendations. []      Patient is unable to participate in plan of care at this time. This patients plan of care is appropriate for delegation to [facility].     Thank you for this referral.  Jose Carlos Edwards OT  Time Calculation: 25 mins

## 2019-02-25 NOTE — PROGRESS NOTES
Problem: Falls - Risk of  Goal: *Absence of Falls  Document Ana Fall Risk and appropriate interventions in the flowsheet. Outcome: Progressing Towards Goal  Fall Risk Interventions:  Mobility Interventions: Communicate number of staff needed for ambulation/transfer, OT consult for ADLs, Patient to call before getting OOB, PT Consult for mobility concerns         Medication Interventions: Patient to call before getting OOB, Teach patient to arise slowly    Elimination Interventions: Call light in reach, Patient to call for help with toileting needs, Toileting schedule/hourly rounds             Problem: Pressure Injury - Risk of  Goal: *Prevention of pressure injury  Document Salty Scale and appropriate interventions in the flowsheet. Outcome: Progressing Towards Goal  Pressure Injury Interventions:             Activity Interventions: Pressure redistribution bed/mattress(bed type), PT/OT evaluation    Mobility Interventions: HOB 30 degrees or less, Pressure redistribution bed/mattress (bed type), PT/OT evaluation    Nutrition Interventions: Document food/fluid/supplement intake    Friction and Shear Interventions: Lift sheet, HOB 30 degrees or less, Minimize layers

## 2019-02-26 LAB
CHOLEST SERPL-MCNC: 142 MG/DL
HDLC SERPL-MCNC: 86 MG/DL
HDLC SERPL: 1.7 {RATIO} (ref 0–5)
LDLC SERPL CALC-MCNC: 38.8 MG/DL (ref 0–100)
LIPID PROFILE,FLP: NORMAL
P2Y12 PLT RESPONSE,PPPR: 219 PRU (ref 194–418)
TRIGL SERPL-MCNC: 86 MG/DL (ref ?–150)
VLDLC SERPL CALC-MCNC: 17.2 MG/DL

## 2019-02-26 PROCEDURE — 65660000000 HC RM CCU STEPDOWN

## 2019-02-26 PROCEDURE — 97530 THERAPEUTIC ACTIVITIES: CPT

## 2019-02-26 PROCEDURE — 80061 LIPID PANEL: CPT

## 2019-02-26 PROCEDURE — 74011250636 HC RX REV CODE- 250/636: Performed by: HOSPITALIST

## 2019-02-26 PROCEDURE — 85576 BLOOD PLATELET AGGREGATION: CPT

## 2019-02-26 PROCEDURE — 74011250636 HC RX REV CODE- 250/636: Performed by: INTERNAL MEDICINE

## 2019-02-26 PROCEDURE — 74011250637 HC RX REV CODE- 250/637: Performed by: PSYCHIATRY & NEUROLOGY

## 2019-02-26 PROCEDURE — 74011250637 HC RX REV CODE- 250/637: Performed by: HOSPITALIST

## 2019-02-26 PROCEDURE — 36415 COLL VENOUS BLD VENIPUNCTURE: CPT

## 2019-02-26 PROCEDURE — 97535 SELF CARE MNGMENT TRAINING: CPT

## 2019-02-26 RX ADMIN — Medication 10 ML: at 22:31

## 2019-02-26 RX ADMIN — PANTOPRAZOLE SODIUM 40 MG: 40 TABLET, DELAYED RELEASE ORAL at 09:17

## 2019-02-26 RX ADMIN — Medication 10 ML: at 15:12

## 2019-02-26 RX ADMIN — MIDODRINE HYDROCHLORIDE 2.5 MG: 5 TABLET ORAL at 06:53

## 2019-02-26 RX ADMIN — Medication 10 ML: at 09:19

## 2019-02-26 RX ADMIN — CARBIDOPA AND LEVODOPA 1 TABLET: 25; 100 TABLET ORAL at 22:30

## 2019-02-26 RX ADMIN — POLYETHYLENE GLYCOL 3350 17 G: 17 POWDER, FOR SOLUTION ORAL at 09:17

## 2019-02-26 RX ADMIN — SIMVASTATIN 20 MG: 20 TABLET, FILM COATED ORAL at 22:30

## 2019-02-26 RX ADMIN — SODIUM CHLORIDE 75 ML/HR: 900 INJECTION, SOLUTION INTRAVENOUS at 05:40

## 2019-02-26 RX ADMIN — CARBIDOPA AND LEVODOPA 1 TABLET: 25; 100 TABLET ORAL at 09:17

## 2019-02-26 RX ADMIN — LOSARTAN POTASSIUM 50 MG: 50 TABLET ORAL at 09:17

## 2019-02-26 RX ADMIN — CLOPIDOGREL BISULFATE 150 MG: 75 TABLET, FILM COATED ORAL at 09:17

## 2019-02-26 RX ADMIN — ASPIRIN 81 MG CHEWABLE TABLET 81 MG: 81 TABLET CHEWABLE at 09:17

## 2019-02-26 RX ADMIN — ENOXAPARIN SODIUM 40 MG: 40 INJECTION SUBCUTANEOUS at 20:30

## 2019-02-26 RX ADMIN — CARBIDOPA AND LEVODOPA 1 TABLET: 25; 100 TABLET ORAL at 17:20

## 2019-02-26 NOTE — PROGRESS NOTES
Physical Therapy Note  2/26/19    Orthosatics     02/26/19 1119 02/26/19 1123 02/26/19 1128   Vital Signs   /66 122/58 101/57   MAP (Calculated) 94 79 72   BP 1 Location Left arm Left arm Left arm   BP 1 Method Automatic Automatic Automatic   BP Patient Position At rest;Supine; Head of bed elevated (Comment degrees) Sitting Standing;Sitting  (umable to remain standing entire time)   *Symptomatic - c/o \"seeing spots\"    Lizzy Real, PT, DPT

## 2019-02-26 NOTE — PROGRESS NOTES
CM received notice from [facility] that they can accept this pt when she is medically ready for d/c.  CM will discuss with pt's son in the am. Arianna Singh

## 2019-02-26 NOTE — PROGRESS NOTES
Problem: Self Care Deficits Care Plan (Adult)  Goal: *Acute Goals and Plan of Care (Insert Text)  Occupational Therapy Goals  Initiated 2/25/2019   1. Patient will perform grooming sitting EOB with supervision/set-up within 7 day(s). 2.  Patient will perform upper body dressing with supervision/set-up within 7 day(s). 3.  Patient will perform lower body dressing with supervision/ set-up within 7 day(s). 4.  Patient will perform toilet transfers with supervision/ set-up within 7 day(s). 5.  Patient will perform all aspects of toileting with supervision/set-up within 7 day(s). 6.  Patient will participate in upper extremity therapeutic exercise/activities with supervision/set-up for 10 minutes within 7 day(s). 7.  Patient will utilize energy conservation techniques during functional activities with verbal cues within 7 day(s). 8.  Patient will improve their Fugl Garcia score by 5 points in prep for ADLs within 7 days. Occupational Therapy TREATMENT  Patient: Maxjuventino Pencil (65 y.o. 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 ASSESSMENT:   The patient presents with Minimum assistance upper body ADLs, Maximum assistance lower body ADLs. Self care addressed in long sit. Patient is able to bring trunk forward away from bed in sitting without assist. Mobility has been impacted by orthostatic hypotension with symptoms described by patient as \"a black thing moves across my vision\". Spoke to MD who cleared patient for attempt at use of MANOJ hose to prevent drop in BP. When discussed with patient, she states even socks leave bruises on her legs because she is on blood thinners.  Spoke with PT and MD and decided to wait until tomorrow to trial MANOJ hose or abdominal binder as MD states she is adjusting her BP medications at this time. The following are barriers to independence with ADLs while in acute care:   - Cognitive and/or behavioral: attention to task, processing, initiation and sequencing  - Medical condition: strength, functional reach, functional endurance and orthostatic hypotension symptomatic, medical history   - Other: History of Parkinsons      Prior level of function:PTA, patient reports living alone, fully independent with ADLs/ IADLs, ambulatory with no AD, retired , history of Parkinson's with occasional RUE tremors         PLAN:  Patient continues to benefit from skilled intervention to address the above impairments. Continue treatment per established plan of care. Recommendations and/or planned interventions for staff:  Mobility, tranfers, use of BSC versus bathroom toilet, orthostatic BPs, self care, right UE strength and coordination    Discharge recommendations: Rehab at inpatient facility: patient can tolerate 3 hours of therapy   Barriers to discharging home, in addition to above listed impairments: lives alone level of physical assist required to maintain patient safety. Equipment recommendations for successful discharge (if) home: none     SUBJECTIVE:   Patient stated I felt something across my chest and threw it off, then I looked down and screamed because I realized it was my arm.     OBJECTIVE DATA SUMMARY:   Cognitive/Behavioral Status:  Neurologic State: Alert  Orientation Level: Oriented to person;Oriented to place;Oriented to situation(oriented to month and year)  Cognition: Follows commands;Decreased attention/concentration  Perception: Cues to attend to right side of body  Perseveration: No perseveration noted  Safety/Judgement: Decreased awareness of environment    Functional Mobility and Transfers for ADLs:  Bed Mobility:  Supine to Sit: Minimum assistance  Sit to Supine: Contact guard assistance    Transfers:  Sit to Stand: Contact guard assistance          Balance:  Sitting: Intact  Standing: Impaired  Standing - Static: Good    ADL Intervention:       Grooming  Washing Face: Supervision/set-up(in supported sitting)    Upper Body Bathing  Bathing Assistance: Minimum assistance  Position Performed: Long sitting on bed    Lower Body Bathing  Lower Body : Minimum assistance  Position Performed: Long sitting on bed  Cues: Physical assistance    Upper Body [address]: Moderate assistance    Lower Body Dressing Assistance  Socks: Total assistance (dependent)  Leg Crossed Method Used: No  Position Performed: Long sitting on bed(bringing legs up to her)         Cognitive Retraining  Organizing/Sequencing: Breaking task down  Attention to Task: Distractibility  Following Commands: Follows one step commands/directions  Safety/Judgement: Decreased awareness of environment  Cues: Tactile cues provided;Verbal cues provided      Activity Tolerance:   Fair  Please refer to the flowsheet for vital signs taken during this treatment.     After treatment patient left:   Supine in bed  Bed in low position  Call light within reach  RN notified  Family at bedside     COMMUNICATION/COLLABORATION:   The patients plan of care was discussed with: Physical Therapist, Registered Nurse and MD (hospitalist)    CYN Ayala  Time Calculation: 46 mins

## 2019-02-26 NOTE — PROGRESS NOTES
Hospitalist Progress Note  Maria Dolores Restrepo MD  Answering service: [phone] from in house phone        Date of Service:  2019  NAME:  Az Chaudhry  :  1935  MRN:  [id number]      Admission Summary:   Pt presents w rt upper ext weakness /numbness    Interval history / Subjective:      Pt with orthostasis still on exam with PT, patient denies any feelings of dizziness however, which is improvement. 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 And assoc:Late acute versus subacute left MCA territory infarcts involve the left parietal lobe and sensory cortex. - Secondary to distal L ICA dissection  - On ASA, Plavix  - Recheck plavix level tomorrow  - Continue Statin   - No acute intervention with dissection    Orthostatic hypotension - holding antihypertensives, on low dose midodrine, repeat orthostatics tomorrow, s/p IV hydration  - PT unable to participate due to orthostasis     Parkinsons - Likely contributing to dizziness, and dysautonomia  - Continue Sinemet  - Neurology following  - Need to discuss goals of care with family and patient, will plan for tomorrow. Constipation - miralax   HLD - statin  GERD - PPI    Code status: full   DVT prophylaxis: Lovenox     Care Plan discussed with: Patient/Family, Nurse and Consultant Dr. Ella Ann   Disposition: TBD     Hospital Problems  Never Reviewed          Codes Class Noted POA    Internal carotid artery dissection [facility]) ICD-10-CM: I77.71  ICD-9-CM: 443.21  2019 Unknown        * (Principal) Cerebrovascular accident (CVA) due to embolism of left carotid artery ([facility] [address] ICD-10-CM: U44.925  ICD-9-CM: 434.11  2019 Yes                Review of Systems:   A comprehensive review of systems was negative except for that written in the HPI.        Vital Signs:    Last 24hrs VS reviewed since prior progress note. Most recent are:  Visit Vitals  /56 (BP 1 Location: Left arm, BP Patient Position: At rest)   Pulse 76   Temp 97.6 °F (36.4 °C)   Resp 15   Ht 5' 1\" (1.549 m)   Wt 77.5 kg (170 lb 13.7 oz)   SpO2 99%   Breastfeeding? No   BMI 32.28 kg/m²       No intake or output data in the 24 hours ending 02/26/19 1709     Physical Examination:             Constitutional:  No acute distress, cooperative, pleasant    ENT:  Oral mucous moist, oropharynx benign. Resp:  CTA bilaterally. No wheezing/rhonchi/rales. No accessory muscle use   CV:  Regular rhythm, normal rate, no murmurs, gallops, rubs    GI:  Soft, non distended, non tender. normoactive bowel sounds, no hepatosplenomegaly     Musculoskeletal:  No edema, warm, 2+ pulses throughout    Neurologic:  Moves all extremities. Rt UP 2-3/5  AAOx3, CN II-XII reviewed     Psych:  Good insight, Not anxious nor agitated. Data Review:    Imaging and laboratory data reviewed. Labs:     No results for input(s): WBC, HGB, HCT, PLT, HGBEXT, HCTEXT, PLTEXT, HGBEXT, HCTEXT, PLTEXT in the last 72 hours. No results for input(s): NA, K, CL, CO2, BUN, CREA, GLU, CA, MG, PHOS, URICA in the last 72 hours. No results for input(s): SGOT, GPT, ALT, AP, TBIL, TBILI, TP, ALB, GLOB, GGT, AML, LPSE in the last 72 hours. No lab exists for component: AMYP, HLPSE  No results for input(s): INR, PTP, APTT in the last 72 hours. No lab exists for component: INREXT, INREXT   No results for input(s): FE, TIBC, PSAT, FERR in the last 72 hours. No results found for: FOL, RBCF   No results for input(s): PH, PCO2, PO2 in the last 72 hours. No results for input(s): CPK, CKNDX, TROIQ in the last 72 hours.     No lab exists for component: CPKMB  Lab Results   Component Value Date/Time    Cholesterol, total 142 02/26/2019 03:46 AM    HDL Cholesterol 86 02/26/2019 03:46 AM    LDL, calculated 38.8 02/26/2019 03:46 AM    Triglyceride 86 02/26/2019 03:46 AM CHOL/HDL Ratio 1.7 02/26/2019 03:46 AM     Lab Results   Component Value Date/Time    Glucose (POC) 97 02/23/2019 12:11 PM     Lab Results   Component Value Date/Time    Color YELLOW/STRAW 02/23/2019 02:03 PM    Appearance CLEAR 02/23/2019 02:03 PM    Specific gravity 1.010 02/23/2019 02:03 PM    pH (UA) 7.0 02/23/2019 02:03 PM    Protein NEGATIVE  02/23/2019 02:03 PM    Glucose NEGATIVE  02/23/2019 02:03 PM    Ketone NEGATIVE  02/23/2019 02:03 PM    Bilirubin NEGATIVE  02/23/2019 02:03 PM    Urobilinogen 0.2 02/23/2019 02:03 PM    Nitrites NEGATIVE  02/23/2019 02:03 PM    Leukocyte Esterase SMALL (A) 02/23/2019 02:03 PM    Epithelial cells FEW 02/23/2019 02:03 PM    Bacteria NEGATIVE  02/23/2019 02:03 PM    WBC 0-4 02/23/2019 02:03 PM    RBC 0-5 02/23/2019 02:03 PM         Medications Reviewed:     Current Facility-Administered Medications   Medication Dose Route Frequency    clopidogrel (PLAVIX) tablet 150 mg  150 mg Oral DAILY    midodrine (PROAMITINE) tablet 2.5 mg  2.5 mg Oral ACB    0.9% sodium chloride infusion  75 mL/hr IntraVENous CONTINUOUS    polyethylene glycol (MIRALAX) packet 17 g  17 g Oral DAILY    sodium chloride (NS) flush 5-40 mL  5-40 mL IntraVENous Q8H    sodium chloride (NS) flush 5-40 mL  5-40 mL IntraVENous PRN    acetaminophen (TYLENOL) tablet 650 mg  650 mg Oral Q4H PRN    Or    acetaminophen (TYLENOL) solution 650 mg  650 mg Per NG tube Q4H PRN    Or    acetaminophen (TYLENOL) suppository 650 mg  650 mg Rectal Q4H PRN    aspirin chewable tablet 81 mg  81 mg Oral DAILY    labetalol (NORMODYNE;TRANDATE) 20 mg/4 mL (5 mg/mL) injection 5 mg  5 mg IntraVENous Q10MIN PRN    enoxaparin (LOVENOX) injection 40 mg  40 mg SubCUTAneous Q24H    carbidopa-levodopa (SINEMET)  mg per tablet 1 Tab  1 Tab Oral TID    pantoprazole (PROTONIX) tablet 40 mg  40 mg Oral DAILY    simvastatin (ZOCOR) tablet 20 mg  20 mg Oral QHS ______________________________________________________________________  EXPECTED LENGTH OF STAY: 4d 9h  ACTUAL LENGTH OF STAY:          3                 Fay Bryan MD

## 2019-02-26 NOTE — PROGRESS NOTES
Problem: Mobility Impaired (Adult and Pediatric)  Goal: *Acute Goals and Plan of Care (Insert Text)  Physical Therapy Goals  Initiated 2/25/2019  1. Patient will move from supine to sit and sit to supine  and scoot up and down in bed with independence within 7 day(s). 2.  Patient will transfer from bed to chair and chair to bed with modified independence using the least restrictive device within 7 day(s). 3.  Patient will perform sit to stand with modified independence within 7 day(s). 4.  Patient will ambulate with modified independence for 300 feet with the least restrictive device within 7 day(s). 5.  Patient will ascend/descend 14 stairs with handrail(s) with modified independence within 7 day(s). 6.  Patient will improve Jc Balance score by 7 points within 7 days. physical Therapy TREATMENT  Patient: Danielle Collier (85 y.o. female)  Date: 2/26/2019  Diagnosis: CVA (cerebral vascular accident) [facility]) [I63.9] Cerebrovascular accident (CVA) due to embolism of left carotid artery ([address]      Precautions: Other (comment)(orthostatic hypotension)  Chart, physical therapy assessment, plan of care and goals were reviewed. ASSESSMENT:  Cleared for mobility attempts by RN. Received pt supine in bed - very motivated to participate in session and reports feeling \"worlds of difference\" from yesterday. Remains most limited by significant, symptomatic orthostatic hypotension and R UE weakness following admission for CVA. Overall, she was able to complete bed mobility, sit<>stands, and take a few side steps at EOB with CGA/min A. Unable to safely progress fwd ambulation or challenge standing balance 2* orthostasis (SBP drop from 150 to 101- see note) this date. Unable to remain standing long enough to obtain full standing BP.  May benefit from trial TEDs/abdominal binder as well as bed that transitions to full chair position, spoke with MD. Assisted back to bed and instructed pt in supine B LE there ex program. Recommending discharge to IP rehab pending progress. Progression toward goals:  []       Improving appropriately and progressing toward goals  [x]       Improving slowly and progressing toward goals  []       Not making progress toward goals and plan of care will be adjusted     PLAN:  Patient continues to benefit from skilled intervention to address the above impairments. Continue treatment per established plan of care. Discharge Recommendations:  Rehab and To Be Determined  Further Equipment Recommendations for Discharge:  TBD     SUBJECTIVE:   Patient stated I just want to do more but I'm so lightheaded .     OBJECTIVE DATA SUMMARY:   Critical Behavior:  Neurologic State: Alert, Appropriate for age  Orientation Level: Oriented X4  Cognition: Appropriate decision making, Appropriate for age attention/concentration, Appropriate safety awareness  Safety/Judgement: Awareness of environment, Insight into deficits  Functional Mobility Training:  Bed Mobility:  Supine to Sit: Minimum assistance  Sit to Supine: Contact guard assistance     Transfers:  Sit to Stand: Contact guard assistance  Stand to Sit: Contact guard assistance     Balance:  Sitting: Intact  Standing: Impaired  Standing - Static: Good       Therapeutic Exercises:   APs, SLRs, bridges, hip abd, leg press, heel slides     Pain:  Pain Scale 1: Numeric (0 - 10)  Pain Intensity 1: 0        Activity Tolerance:   See note - limited by significant orthostatic hypotension     Please refer to the flowsheet for vital signs taken during this treatment. After treatment:   [] Patient left in no apparent distress sitting up in chair  [x] Patient left in no apparent distress in bed  [x] Call bell left within reach  [x] Nursing notified  [] Caregiver present  [x] Bed alarm activated    COMMUNICATION/EDUCATION:   The patients plan of care was discussed with: Certified Occupational Therapy Assistant, Registered Nurse and Physician.     Patient was educated regarding Her deficit(s) of R UE weakness as this relates to Her diagnosis of CVA. She demonstrated Good understanding as evidenced by nodding. Patient and/or family was verbally educated on the BE FAST acronym for signs/symptoms of CVA and TIA. BE FAST was written on patient's communication board  for visual education and reinforcement. All questions answered with patient indicating good understanding. [x]  Fall prevention education was provided and the patient/caregiver indicated understanding. [x]  Patient/family have participated as able in goal setting and plan of care. [x]  Patient/family agree to work toward stated goals and plan of care. []  Patient understands intent and goals of therapy, but is neutral about his/her participation. []  Patient is unable to participate in goal setting and plan of care.     Thank you for this referral.  Rashaun Swain, PT, DPT   Time Calculation: 28 mins

## 2019-02-26 NOTE — PROGRESS NOTES
CM attempted to call pt's son, NADEEN ([phone]), but the call went straight to voice mail. SHANI called pt's daughter, Andrew Hernandez ([phone]) to discuss pt. She was not able to talk a long time secondary to having a personal tragedy. She did state that her brother, NADEEN, wants a referral to go to [facility]. CM sent a referral to [facility] and called NADEEN and left him a voice mail asking him to call this CM.  Deette Leventhal

## 2019-02-26 NOTE — PROGRESS NOTES
Problem: Pressure Injury - Risk of  Goal: *Prevention of pressure injury  Document Salty Scale and appropriate interventions in the flowsheet. Outcome: Progressing Towards Goal  Pressure Injury Interventions:             Activity Interventions: Increase time out of bed, PT/OT evaluation    Mobility Interventions: PT/OT evaluation, HOB 30 degrees or less, Float heels, Assess need for specialty bed    Nutrition Interventions: Document food/fluid/supplement intake, Offer support with meals,snacks and hydration    Friction and Shear Interventions: HOB 30 degrees or less, Lift sheet, Lift team/patient mobility team

## 2019-02-26 NOTE — PROGRESS NOTES
Problem: Falls - Risk of  Goal: *Absence of Falls  Document Ana Fall Risk and appropriate interventions in the flowsheet.   Outcome: Progressing Towards Goal  Fall Risk Interventions:  Mobility Interventions: Bed/chair exit alarm, Communicate number of staff needed for ambulation/transfer, Patient to call before getting OOB, PT Consult for mobility concerns         Medication Interventions: Bed/chair exit alarm, Patient to call before getting OOB, Teach patient to arise slowly    Elimination Interventions: Bed/chair exit alarm, Call light in reach, Patient to call for help with toileting needs, Toilet paper/wipes in reach, Toileting schedule/hourly rounds

## 2019-02-26 NOTE — PROGRESS NOTES
Bedside shift change report given to Mahesh Macedo RN (oncoming nurse) by Alysa Morataya RN (offgoing nurse).  Report included the following information SBAR, kardex summary, meds, orders, and plan

## 2019-02-27 LAB
ANION GAP SERPL CALC-SCNC: 8 MMOL/L (ref 5–15)
ASPIRIN TEST, ASPIRN: 392 ARU
BUN SERPL-MCNC: 11 MG/DL (ref 6–20)
BUN/CREAT SERPL: 14 (ref 12–20)
CALCIUM SERPL-MCNC: 8.5 MG/DL (ref 8.5–10.1)
CHLORIDE SERPL-SCNC: 109 MMOL/L (ref 97–108)
CO2 SERPL-SCNC: 25 MMOL/L (ref 21–32)
CREAT SERPL-MCNC: 0.78 MG/DL (ref 0.55–1.02)
GLUCOSE SERPL-MCNC: 84 MG/DL (ref 65–100)
MAGNESIUM SERPL-MCNC: 1.9 MG/DL (ref 1.6–2.4)
P2Y12 PLT RESPONSE,PPPR: 211 PRU (ref 194–418)
PHOSPHATE SERPL-MCNC: 4.2 MG/DL (ref 2.6–4.7)
POTASSIUM SERPL-SCNC: 3.9 MMOL/L (ref 3.5–5.1)
SODIUM SERPL-SCNC: 142 MMOL/L (ref 136–145)

## 2019-02-27 PROCEDURE — 97530 THERAPEUTIC ACTIVITIES: CPT

## 2019-02-27 PROCEDURE — 80048 BASIC METABOLIC PNL TOTAL CA: CPT

## 2019-02-27 PROCEDURE — 74011250637 HC RX REV CODE- 250/637: Performed by: INTERNAL MEDICINE

## 2019-02-27 PROCEDURE — 83735 ASSAY OF MAGNESIUM: CPT

## 2019-02-27 PROCEDURE — 65660000000 HC RM CCU STEPDOWN

## 2019-02-27 PROCEDURE — 85576 BLOOD PLATELET AGGREGATION: CPT

## 2019-02-27 PROCEDURE — 74011250637 HC RX REV CODE- 250/637: Performed by: PSYCHIATRY & NEUROLOGY

## 2019-02-27 PROCEDURE — 74011250637 HC RX REV CODE- 250/637: Performed by: HOSPITALIST

## 2019-02-27 PROCEDURE — 97116 GAIT TRAINING THERAPY: CPT

## 2019-02-27 PROCEDURE — 36415 COLL VENOUS BLD VENIPUNCTURE: CPT

## 2019-02-27 PROCEDURE — 74011250636 HC RX REV CODE- 250/636: Performed by: HOSPITALIST

## 2019-02-27 PROCEDURE — 97535 SELF CARE MNGMENT TRAINING: CPT

## 2019-02-27 PROCEDURE — 84100 ASSAY OF PHOSPHORUS: CPT

## 2019-02-27 RX ORDER — DILTIAZEM HYDROCHLORIDE 5 MG/ML
5 INJECTION INTRAVENOUS
Status: DISCONTINUED | OUTPATIENT
Start: 2019-02-27 | End: 2019-02-27 | Stop reason: CLARIF

## 2019-02-27 RX ORDER — MIDODRINE HYDROCHLORIDE 5 MG/1
2.5 TABLET ORAL
Status: DISCONTINUED | OUTPATIENT
Start: 2019-02-27 | End: 2019-02-28 | Stop reason: HOSPADM

## 2019-02-27 RX ADMIN — MIDODRINE HYDROCHLORIDE 2.5 MG: 5 TABLET ORAL at 17:33

## 2019-02-27 RX ADMIN — CARBIDOPA AND LEVODOPA 1 TABLET: 25; 100 TABLET ORAL at 08:06

## 2019-02-27 RX ADMIN — Medication 10 ML: at 21:02

## 2019-02-27 RX ADMIN — CARBIDOPA AND LEVODOPA 1 TABLET: 25; 100 TABLET ORAL at 16:46

## 2019-02-27 RX ADMIN — Medication 10 ML: at 06:00

## 2019-02-27 RX ADMIN — ASPIRIN 81 MG CHEWABLE TABLET 81 MG: 81 TABLET CHEWABLE at 08:06

## 2019-02-27 RX ADMIN — SIMVASTATIN 20 MG: 20 TABLET, FILM COATED ORAL at 21:00

## 2019-02-27 RX ADMIN — CARBIDOPA AND LEVODOPA 1 TABLET: 25; 100 TABLET ORAL at 21:00

## 2019-02-27 RX ADMIN — PANTOPRAZOLE SODIUM 40 MG: 40 TABLET, DELAYED RELEASE ORAL at 08:06

## 2019-02-27 RX ADMIN — Medication 10 ML: at 14:36

## 2019-02-27 RX ADMIN — POLYETHYLENE GLYCOL 3350 17 G: 17 POWDER, FOR SOLUTION ORAL at 08:06

## 2019-02-27 RX ADMIN — ENOXAPARIN SODIUM 40 MG: 40 INJECTION SUBCUTANEOUS at 18:21

## 2019-02-27 RX ADMIN — CLOPIDOGREL BISULFATE 150 MG: 75 TABLET, FILM COATED ORAL at 08:06

## 2019-02-27 NOTE — PROGRESS NOTES
Physical Therapy Note  19    Orthostatics     1923 19 0929 19 0933   Vital Signs   Pulse (Heart Rate) 78 88 92   /55 106/49 114/77   MAP (Calculated) 80 68 89   BP 1 Location Left arm Left arm Left arm   BP 1 Method Automatic Automatic Automatic   BP Patient Position At rest;Supine Sitting Standing  (marching in place)       19 0938 19 0940   Vital Signs   Pulse (Heart Rate) --  --    BP 96/58 138/72   MAP (Calculated) 71 94   BP 1 Location Left arm Left arm   BP 1 Method Automatic Automatic   BP Patient Position Post activity; Sitting Supine   Remains symptmatic    B thigh high TEDs donned:  Supine: 144/63mmHg  Sittin/49mmHg    Zuleyma Bis, PT, DPT

## 2019-02-27 NOTE — PROGRESS NOTES
Hospitalist Progress Note  Juan José Patel MD  Answering service: [phone] from in house phone        Date of Service:  2019  NAME:  Jose Ramon Tatum  :  1935  MRN:  [id number]      Admission Summary:   Pt presents w rt upper ext weakness /numbness    Interval history / Subjective:      Remains orthostasis despite DC losartan, hopefully will improve over next few days. Worked with PT, and said she was asymptomatic, however per PT she was dizzy and unsteady. 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 And assoc:Late acute versus subacute left MCA territory infarcts involve the left parietal lobe and sensory cortex. - Secondary to distal L ICA dissection  - On ASA, Plavix  - Recheck plavix level tomorrow, unclear why she remains subtherapeutic  - Continue Statin   - No acute intervention with dissection    Orthostatic hypotension - holding antihypertensives, on low dose midodrine, repeat orthostatics tomorrow, s/p IV hydration  - PT unable to participate due to orthostasis \  - Allow permissive HTN given large drop in BP with standing. Parkinsons - Likely contributing to dizziness, and dysautonomia  - Continue Sinemet  - Neurology following  - Need to discuss goals of care with family and patient, will attempt to call family today.      Constipation - miralax   HLD - statin  GERD - PPI    Code status: full   DVT prophylaxis: Lovenox     Care Plan discussed with: Patient/Family, Nurse and Consultant Dr. Lou Villarreal   Disposition: TBD     Hospital Problems  Never Reviewed          Codes Class Noted POA    Internal carotid artery dissection [facility]) ICD-10-CM: I77.71  ICD-9-CM: 443.21  2019 Unknown        * (Principal) Cerebrovascular accident (CVA) due to embolism of left carotid artery ([facility] [address] ICD-10-CM: B72.774  ICD-9-CM: 434.11  2/23/2019 Yes                Review of Systems:   A comprehensive review of systems was negative except for that written in the HPI. Vital Signs:    Last 24hrs VS reviewed since prior progress note. Most recent are:  Visit Vitals  /65   Pulse 72   Temp 98 °F (36.7 °C)   Resp 17   Ht 5' 1\" (1.549 m)   Wt 80.8 kg (178 lb 3.2 oz)   SpO2 98%   Breastfeeding? No   BMI 33.67 kg/m²       No intake or output data in the 24 hours ending 02/27/19 1624     Physical Examination:             Constitutional:  No acute distress, cooperative, pleasant    ENT:  Oral mucous moist, oropharynx benign. Resp:  CTA bilaterally. No wheezing/rhonchi/rales. No accessory muscle use   CV:  Regular rhythm, normal rate, no murmurs, gallops, rubs    GI:  Soft, non distended, non tender. normoactive bowel sounds, no hepatosplenomegaly     Musculoskeletal:  No edema, warm, 2+ pulses throughout    Neurologic:  Moves all extremities. Rt UP 2-3/5  AAOx3, CN II-XII reviewed     Psych:  Good insight, Not anxious nor agitated. Data Review:    Imaging and laboratory data reviewed. Labs:     No results for input(s): WBC, HGB, HCT, PLT, HGBEXT, HCTEXT, PLTEXT, HGBEXT, HCTEXT, PLTEXT in the last 72 hours. Recent Labs     02/27/19  0257      K 3.9   *   CO2 25   BUN 11   CREA 0.78   GLU 84   CA 8.5   MG 1.9   PHOS 4.2     No results for input(s): SGOT, GPT, ALT, AP, TBIL, TBILI, TP, ALB, GLOB, GGT, AML, LPSE in the last 72 hours. No lab exists for component: AMYP, HLPSE  No results for input(s): INR, PTP, APTT in the last 72 hours. No lab exists for component: INREXT, INREXT   No results for input(s): FE, TIBC, PSAT, FERR in the last 72 hours. No results found for: FOL, RBCF   No results for input(s): PH, PCO2, PO2 in the last 72 hours. No results for input(s): CPK, CKNDX, TROIQ in the last 72 hours.     No lab exists for component: CPKMB  Lab Results   Component Value Date/Time    Cholesterol, total 142 02/26/2019 03:46 AM    HDL Cholesterol 86 02/26/2019 03:46 AM    LDL, calculated 38.8 02/26/2019 03:46 AM    Triglyceride 86 02/26/2019 03:46 AM    CHOL/HDL Ratio 1.7 02/26/2019 03:46 AM     Lab Results   Component Value Date/Time    Glucose (POC) 97 02/23/2019 12:11 PM     Lab Results   Component Value Date/Time    Color YELLOW/STRAW 02/23/2019 02:03 PM    Appearance CLEAR 02/23/2019 02:03 PM    Specific gravity 1.010 02/23/2019 02:03 PM    pH (UA) 7.0 02/23/2019 02:03 PM    Protein NEGATIVE  02/23/2019 02:03 PM    Glucose NEGATIVE  02/23/2019 02:03 PM    Ketone NEGATIVE  02/23/2019 02:03 PM    Bilirubin NEGATIVE  02/23/2019 02:03 PM    Urobilinogen 0.2 02/23/2019 02:03 PM    Nitrites NEGATIVE  02/23/2019 02:03 PM    Leukocyte Esterase SMALL (A) 02/23/2019 02:03 PM    Epithelial cells FEW 02/23/2019 02:03 PM    Bacteria NEGATIVE  02/23/2019 02:03 PM    WBC 0-4 02/23/2019 02:03 PM    RBC 0-5 02/23/2019 02:03 PM         Medications Reviewed:     Current Facility-Administered Medications   Medication Dose Route Frequency    midodrine (PROAMITINE) tablet 2.5 mg  2.5 mg Oral TID WITH MEALS    clopidogrel (PLAVIX) tablet 150 mg  150 mg Oral DAILY    polyethylene glycol (MIRALAX) packet 17 g  17 g Oral DAILY    sodium chloride (NS) flush 5-40 mL  5-40 mL IntraVENous Q8H    sodium chloride (NS) flush 5-40 mL  5-40 mL IntraVENous PRN    acetaminophen (TYLENOL) tablet 650 mg  650 mg Oral Q4H PRN    Or    acetaminophen (TYLENOL) solution 650 mg  650 mg Per NG tube Q4H PRN    Or    acetaminophen (TYLENOL) suppository 650 mg  650 mg Rectal Q4H PRN    aspirin chewable tablet 81 mg  81 mg Oral DAILY    labetalol (NORMODYNE;TRANDATE) 20 mg/4 mL (5 mg/mL) injection 5 mg  5 mg IntraVENous Q10MIN PRN    enoxaparin (LOVENOX) injection 40 mg  40 mg SubCUTAneous Q24H    carbidopa-levodopa (SINEMET)  mg per tablet 1 Tab  1 Tab Oral TID    pantoprazole (PROTONIX) tablet 40 mg  40 mg Oral DAILY    simvastatin (ZOCOR) tablet 20 mg  20 mg Oral QHS     ______________________________________________________________________  EXPECTED LENGTH OF STAY: 4d 9h  ACTUAL LENGTH OF STAY:          4                 Dottie Dolan MD

## 2019-02-27 NOTE — PROGRESS NOTES
Bedside and Verbal shift change report given to Sadie Oropeza RN (oncoming nurse) by Sergo Leon RN (offgoing nurse). Report included the following information SBAR, Kardex, ED Summary, Procedure Summary, Intake/Output, MAR and Accordion.

## 2019-02-27 NOTE — ACP (ADVANCE CARE PLANNING)
Visited pt in response to a Spiritual Care order request to assist with an Advance Medical Directive (AMD). Explained document to patient, who wishes to first talk with her children. She expects one or more of her children to visit her today. She was encouraged to have a  paged when she is ready to complete an AMD. Chaplains will support as needed.   Chaplain Olvin, MDiv, MS, [facility]  [phone] [phone])

## 2019-02-27 NOTE — PROGRESS NOTES
Problem: Pressure Injury - Risk of  Goal: *Prevention of pressure injury  Document Salty Scale and appropriate interventions in the flowsheet. Outcome: Progressing Towards Goal  Pressure Injury Interventions: Activity Interventions: Assess need for specialty bed, Pressure redistribution bed/mattress(bed type), PT/OT evaluation    Mobility Interventions: Float heels, Pressure redistribution bed/mattress (bed type), HOB 30 degrees or less    Nutrition Interventions: Document food/fluid/supplement intake    Friction and Shear Interventions: Apply protective barrier, creams and emollients, HOB 30 degrees or less               Comments: Problem: Pressure Injury - Risk of  Goal: *Prevention of pressure injury  Document Salty Scale and appropriate interventions in the flowsheet.   Outcome: Progressing Towards Goal  Pressure Injury Interventions:        Activity Interventions: Assess need for specialty bed, Pressure redistribution bed/mattress(bed type), PT/OT evaluation     Mobility Interventions: Float heels, Pressure redistribution bed/mattress (bed type), HOB 30 degrees or less     Nutrition Interventions: Document food/fluid/supplement intake     Friction and Shear Interventions: Apply protective barrier, creams and emollients, HOB 30 degrees or less

## 2019-02-27 NOTE — PROGRESS NOTES
Bedside and Verbal shift change report given to [address] [place] [address] [place] (oncoming nurse) by Leonie Ennis (offgoing nurse). Report included the following information SBAR, Kardex and Recent Results.

## 2019-02-27 NOTE — PROGRESS NOTES
Problem: Self Care Deficits Care Plan (Adult)  Goal: *Acute Goals and Plan of Care (Insert Text)  Occupational Therapy Goals  Initiated 2/25/2019   1. Patient will perform grooming sitting EOB with supervision/set-up within 7 day(s). 2.  Patient will perform upper body dressing with supervision/set-up within 7 day(s). 3.  Patient will perform lower body dressing with supervision/ set-up within 7 day(s). 4.  Patient will perform toilet transfers with supervision/ set-up within 7 day(s). 5.  Patient will perform all aspects of toileting with supervision/set-up within 7 day(s). 6.  Patient will participate in upper extremity therapeutic exercise/activities with supervision/set-up for 10 minutes within 7 day(s). 7.  Patient will utilize energy conservation techniques during functional activities with verbal cues within 7 day(s). 8.  Patient will improve their Fugl Garcia score by 5 points in prep for ADLs within 7 days. Occupational Therapy TREATMENT  Patient: Marquis Medellin (46 y.o. female)  Date: 2/27/2019  Diagnosis: CVA (cerebral vascular accident) [facility]) [I63.9] Cerebrovascular accident (CVA) due to embolism of left carotid artery ([facility] [address]      Precautions: Other (comment)(orthostatic hypotension)  Chart, occupational therapy assessment, plan of care, and goals were reviewed. ASSESSMENT:   The patient presents with Minimum assistance upper body ADLs, Maximum assistance lower body ADLs. Functional mobiltiy continues to be impacted by orthostatic hypotension.    The following are barriers to independence with ADLs while in acute care:   - Cognitive and/or behavioral: attention to task, processing, sequencing and short term memory loss  - Medical condition: ROM, strength, functional reach, functional endurance, standing balance, orthostatic hypotension symptomatic and medical history    - Other: history of Parkinson's,  Patient reports difficulty self feeding with dominant right hand secondary to difficulty holding utensil. States she is compensating with left hand, but finds it awkward also. Issued cylindical foam for enlarged  of utensils after trial with same. Patient using with set up for utensil and Mod I to eat. Noted that PT trialed use of thigh high MANOJ hose for orthostatic hypotension without success. 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 Continue treatment per established plan of care. Recommendations and/or planned interventions for staff:  Functional use of right UE, right UE AROM, strength and coordination, LE self care, transfers progressing from [facility] to toilet, standing balance for self care. Discharge recommendations: Rehab at inpatient facility: patient can tolerate 3 hours of therapy   Barriers to discharging home, in addition to above listed impairments: lives alone entry and exit into the home, patient will require physical assist from medical transport/ambulance level of physical assist required to maintain patient safety. Equipment recommendations for successful discharge (if) home: none     SUBJECTIVE:   Patient stated Its hard to eat with my left hand too.     OBJECTIVE DATA SUMMARY:   Cognitive/Behavioral Status:  Neurologic State: Alert  Orientation Level: Oriented to person;Oriented to place;Oriented to situation  Cognition: Decreased attention/concentration; Follows commands  Perception: Cues to attend to right side of body  Perseveration: No perseveration noted  Safety/Judgement: Awareness of environment    Functional Mobility and Transfers for ADLs:  Bed Mobility:  Supine to Sit: Minimum assistance  Sit to Supine: Minimum assistance    Transfers:  Sit to Stand: Contact guard assistance Balance:  Sitting: Intact  Standing: Impaired  Standing - Static: Good  Standing - Dynamic : Fair    ADL Intervention:  Feeding  Food to Mouth: Modified independent(with right hand with enlarged  handle)       Cognitive Retraining  Safety/Judgement: Awareness of environment       Therapeutic Exercises: Bilateral UEs with cues, extra time, and demonstration. EXERCISE   Sets   Reps   Active Active Assist   Passive   Comments   Shoulder Flexion 1 10 [x]               []               []                  Shoulder Horizontal Add./Abduction 1 10 [x]               []               []                  Shoulder external rotation 1 3 [x]               []               []                  Elbow flexion/extension 1 10 [x]               []               []                  Forearm pronation/supination 1 10 [x]               []               []                  Finger flexion/extension 1 5 [x]               []               []                        Activity Tolerance:   Fair  Please refer to the flowsheet for vital signs taken during this treatment.     After treatment patient left:   Supine in bed  Bed/Chair-wheels locked  Bed in low position  Call light within reach  RN notified     COMMUNICATION/COLLABORATION:   The patients plan of care was discussed with: Registered Nurse    CYN Feliciano  Time Calculation: 27 mins

## 2019-02-27 NOTE — PROGRESS NOTES
Spiritual Care Assessment/Progress Note  [facility]      NAME: Sharlon Callow      MRN: [id number]  AGE: 80 y.o.  SEX: female  Evangelical Affiliation: Yarsanism   Language: English     2/27/2019     Total Time (in minutes): 30     Spiritual Assessment begun in [address] through conversation with:         [x]Patient        [] Family    [] Friend(s)        Reason for Consult: Advance medical directive consult     Spiritual beliefs: (Please include comment if needed)     [x] Identifies with a heri tradition:         [] Supported by a heri community:            [] Claims no spiritual orientation:           [] Seeking spiritual identity:                [] Adheres to an individual form of spirituality:           [] Not able to assess:                           Identified resources for coping:      [] Prayer                               [] Music                  [] Guided Imagery     [x] Family/friends                 [] Pet visits     [] Devotional reading                         [] Unknown     [] Other:                                               Interventions offered during this visit: (See comments for more details)    Patient Interventions: Advance medical directive consult, Affirmation of heri, Affirmation of emotions/emotional suffering           Plan of Care:     [] Support spiritual and/or cultural needs    [x] Support AMD and/or advance care planning process      [] Support grieving process   [] Coordinate Rites and/or Rituals    [] Coordination with community clergy   [] No spiritual needs identified at this time   [] Detailed Plan of Care below (See Comments)  [] Make referral to Music Therapy  [] Make referral to Pet Therapy     [] Make referral to Addiction services  [] Make referral to [facility]  [] Make referral to Spiritual Care Partner  [] No future visits requested        [x] Follow up visits as needed     Visited pt in response to a Spiritual Care order request to assist with an Advance Medical Directive (AMD). Explained document to patient, who wishes to first talk with her children. She expects one or more of her children to visit her today. She was encouraged to have a  paged when she is ready to complete an AMD. Chaplains will support as needed.   Chaplain Waters MDiv, MS, [facility]  [phone] [phone])

## 2019-02-27 NOTE — PROGRESS NOTES
Problem: Mobility Impaired (Adult and Pediatric)  Goal: *Acute Goals and Plan of Care (Insert Text)  Physical Therapy Goals  Initiated 2/25/2019  1. Patient will move from supine to sit and sit to supine  and scoot up and down in bed with independence within 7 day(s). 2.  Patient will transfer from bed to chair and chair to bed with modified independence using the least restrictive device within 7 day(s). 3.  Patient will perform sit to stand with modified independence within 7 day(s). 4.  Patient will ambulate with modified independence for 300 feet with the least restrictive device within 7 day(s). 5.  Patient will ascend/descend 14 stairs with handrail(s) with modified independence within 7 day(s). 6.  Patient will improve Jc Balance score by 7 points within 7 days. physical Therapy TREATMENT  Patient: Georges Francis (67 y.o. female)  Date: 2/27/2019  Diagnosis: CVA (cerebral vascular accident) [facility]) [I63.9] Cerebrovascular accident (CVA) due to embolism of left carotid artery ([facility] [address]      Precautions: Other (comment)(orthostatic hypotension)  Chart, physical therapy assessment, plan of care and goals were reviewed. ASSESSMENT:  Cleared for mobility progression by RN. Received pt supine in bed - agreeable and motivated for session. Reviewed all supine there ex and completed x10 reps ea. BPs again closely assessed (see separate note) and remains significantly orthostatic (SBP 130s -> 90s) with continued complaints of nausea, dizziness, and \"blackness\" despite ample time and increased activity for BP support. B thigh high TEDs donned and reassessed BP however again dropped >27HZT systolic from supine>sit. Able to progress gait training for very short distance in room with CGA for safety. Assisted back to bed at end of sessio and left in near modified chair position.  Pt is not safe to discharge home alone at this time and appears to lack full insight to safety/deficits regarding symptomatic BPs despite ongoing education - continue to recommend discharge to acute IP rehab setting. Will follow. Progression toward goals:  []       Improving appropriately and progressing toward goals  [x]       Improving slowly and progressing toward goals  []       Not making progress toward goals and plan of care will be adjusted     PLAN:  Patient continues to benefit from skilled intervention to address the above impairments. Continue treatment per established plan of care. Discharge Recommendations:  Inpatient Rehab  Further Equipment Recommendations for Discharge:  TBD     SUBJECTIVE:   Patient stated Do you think I should just keep pushing through it?     OBJECTIVE DATA SUMMARY:   Critical Behavior:  Neurologic State: Anesthetized, Eyes open spontaneously  Orientation Level: Oriented X4  Cognition: Follows commands  Safety/Judgement: Decreased awareness of environment  Functional Mobility Training:  Bed Mobility:     Supine to Sit: Minimum assistance  Sit to Supine: Minimum assistance           Transfers:  Sit to Stand: Contact guard assistance  Stand to Sit: Contact guard assistance     Balance:  Sitting: Intact  Standing: Impaired  Standing - Static: Good  Standing - Dynamic : Fair  Ambulation/Gait Training:  Distance (ft): 10 Feet (ft)(x2 reps)  Assistive Device: Gait belt  Ambulation - Level of Assistance: Contact guard assistance   Base of Support: Narrowed  Speed/Nika: Slow  Step Length: Left shortened;Right shortened    Pain:  Pain Scale 1: Numeric (0 - 10)  Pain Intensity 1: 0     Activity Tolerance:   + symptomatic orthostatic BPs - see separate note    Please refer to the flowsheet for vital signs taken during this treatment.   After treatment:   [] Patient left in no apparent distress sitting up in chair  [x] Patient left in no apparent distress in bed  [x] Call bell left within reach  [x] Nursing notified  [] Caregiver present  [x] Bed alarm activated    COMMUNICATION/EDUCATION:   The patients plan of care was discussed with: Registered Nurse and Physician. Patient was educated regarding Her deficit(s) of R sided weakness as this relates to Her diagnosis of CVA. She demonstrated Good understanding as evidenced by nodding. Patient and/or family was verbally educated on the BE FAST acronym for signs/symptoms of CVA and TIA. BE FAST was written on patient's communication board  for visual education and reinforcement. All questions answered with patient indicating good understanding. [x]  Fall prevention education was provided and the patient/caregiver indicated understanding. [x]  Patient/family have participated as able in goal setting and plan of care. [x]  Patient/family agree to work toward stated goals and plan of care. []  Patient understands intent and goals of therapy, but is neutral about his/her participation. []  Patient is unable to participate in goal setting and plan of care.     Thank you for this referral.  Eileen Ghotra, PT, DPT   Time Calculation: 50 mins

## 2019-02-27 NOTE — PROGRESS NOTES
SHANI discussed this pt with attending. She stated that this pt should be ready for d/c to [facility] tomorrow. CM informed the pt and her son, CN. Both are in agreement with this plan. SHANI will follow tomorrow.  Celso Lama

## 2019-02-28 VITALS
HEIGHT: 61 IN | TEMPERATURE: 98.1 F | OXYGEN SATURATION: 96 % | DIASTOLIC BLOOD PRESSURE: 62 MMHG | WEIGHT: 178.2 LBS | BODY MASS INDEX: 33.64 KG/M2 | HEART RATE: 72 BPM | SYSTOLIC BLOOD PRESSURE: 141 MMHG | RESPIRATION RATE: 18 BRPM

## 2019-02-28 PROBLEM — G20 PARKINSON DISEASE (HCC): Status: ACTIVE | Noted: 2019-02-28

## 2019-02-28 PROBLEM — I95.1 ORTHOSTATIC HYPOTENSION: Status: ACTIVE | Noted: 2019-02-28

## 2019-02-28 LAB — P2Y12 PLT RESPONSE,PPPR: 125 PRU (ref 194–418)

## 2019-02-28 PROCEDURE — 74011250637 HC RX REV CODE- 250/637: Performed by: PSYCHIATRY & NEUROLOGY

## 2019-02-28 PROCEDURE — 77030012893

## 2019-02-28 PROCEDURE — 36415 COLL VENOUS BLD VENIPUNCTURE: CPT

## 2019-02-28 PROCEDURE — 74011250637 HC RX REV CODE- 250/637: Performed by: INTERNAL MEDICINE

## 2019-02-28 PROCEDURE — 85576 BLOOD PLATELET AGGREGATION: CPT

## 2019-02-28 PROCEDURE — 74011250637 HC RX REV CODE- 250/637: Performed by: HOSPITALIST

## 2019-02-28 RX ORDER — MIDODRINE HYDROCHLORIDE 2.5 MG/1
2.5 TABLET ORAL
Qty: 90 TAB | Refills: 0 | Status: SHIPPED | OUTPATIENT
Start: 2019-02-28 | End: 2019-03-30

## 2019-02-28 RX ORDER — GUAIFENESIN 100 MG/5ML
81 LIQUID (ML) ORAL DAILY
Qty: 30 TAB | Refills: 0 | Status: SHIPPED | OUTPATIENT
Start: 2019-02-28 | End: 2019-03-30

## 2019-02-28 RX ORDER — ATORVASTATIN CALCIUM 40 MG/1
40 TABLET, FILM COATED ORAL DAILY
Qty: 30 TAB | Refills: 0 | Status: SHIPPED | OUTPATIENT
Start: 2019-02-28 | End: 2022-06-06

## 2019-02-28 RX ORDER — CARBIDOPA AND LEVODOPA 25; 100 MG/1; MG/1
1 TABLET ORAL 3 TIMES DAILY
Qty: 90 TAB | Refills: 0 | Status: SHIPPED | OUTPATIENT
Start: 2019-02-28 | End: 2019-03-30

## 2019-02-28 RX ORDER — CLOPIDOGREL BISULFATE 75 MG/1
150 TABLET ORAL DAILY
Qty: 60 TAB | Refills: 0 | Status: SHIPPED | OUTPATIENT
Start: 2019-02-28 | End: 2019-03-30

## 2019-02-28 RX ADMIN — PANTOPRAZOLE SODIUM 40 MG: 40 TABLET, DELAYED RELEASE ORAL at 09:11

## 2019-02-28 RX ADMIN — Medication 10 ML: at 06:54

## 2019-02-28 RX ADMIN — ASPIRIN 81 MG CHEWABLE TABLET 81 MG: 81 TABLET CHEWABLE at 09:11

## 2019-02-28 RX ADMIN — CARBIDOPA AND LEVODOPA 1 TABLET: 25; 100 TABLET ORAL at 09:11

## 2019-02-28 RX ADMIN — POLYETHYLENE GLYCOL 3350 17 G: 17 POWDER, FOR SOLUTION ORAL at 09:13

## 2019-02-28 RX ADMIN — MIDODRINE HYDROCHLORIDE 2.5 MG: 5 TABLET ORAL at 09:12

## 2019-02-28 RX ADMIN — CLOPIDOGREL BISULFATE 150 MG: 75 TABLET, FILM COATED ORAL at 09:13

## 2019-02-28 NOTE — CONSULTS
Neurology Progress Note    Patient ID:  Basilia Pierre  [id number]  80 y.o.  1935    Chief Complaint: Stroke, right arm weakness    Subjective:     77-year-old woman with left hemispheric infarct small with resultant right-sided weakness. Since being admitted we have been able to titrate her medications as far as antiplatelets. She is aspirin and Plavix therapeutic now. Midrin added in the morning for hypotension. She still gets a little bit dizzy sitting up but is better. Plans to go to rehab today. Objective:       ROS:  Per HPI  All other 12 pt ROS negative    Meds:  Current Facility-Administered Medications   Medication Dose Route Frequency    midodrine (PROAMITINE) tablet 2.5 mg  2.5 mg Oral TID WITH MEALS    clopidogrel (PLAVIX) tablet 150 mg  150 mg Oral DAILY    polyethylene glycol (MIRALAX) packet 17 g  17 g Oral DAILY    sodium chloride (NS) flush 5-40 mL  5-40 mL IntraVENous Q8H    sodium chloride (NS) flush 5-40 mL  5-40 mL IntraVENous PRN    acetaminophen (TYLENOL) tablet 650 mg  650 mg Oral Q4H PRN    Or    acetaminophen (TYLENOL) solution 650 mg  650 mg Per NG tube Q4H PRN    Or    acetaminophen (TYLENOL) suppository 650 mg  650 mg Rectal Q4H PRN    aspirin chewable tablet 81 mg  81 mg Oral DAILY    labetalol (NORMODYNE;TRANDATE) 20 mg/4 mL (5 mg/mL) injection 5 mg  5 mg IntraVENous Q10MIN PRN    enoxaparin (LOVENOX) injection 40 mg  40 mg SubCUTAneous Q24H    carbidopa-levodopa (SINEMET)  mg per tablet 1 Tab  1 Tab Oral TID    pantoprazole (PROTONIX) tablet 40 mg  40 mg Oral DAILY    simvastatin (ZOCOR) tablet 20 mg  20 mg Oral QHS       MRI Results (maximum last 3): Results from [place] encounter on 02/23/19   MRI BRAIN WO CONT    Narrative EXAM:  MRI BRAIN WO CONT, MRA NECK WO CONT, MRA BRAIN WO CONT    INDICATION:  Right upper extremity weakness. Intermittent dizziness. Parkinson's  disease. COMPARISON: None.     TECHNIQUE: Multisequence, multiplanar MRI of the brain without contrast.  Noncontrast time of flight MR angiography of the head. Noncontrast  time-of-flight MR angiography of the neck. (3 separate studies reported  together) Maximum intensity projection reformats of the MR angiography. Examination  performed on the 3 Parul magnet. FINDINGS: MRI Luisana Anaya: Multiple small areas of restricted diffusion in the  posterior left MCA territory involving the left parietal lobe and postcentral  gyrus. There is associated hyperintense T2/flair signal in these areas of  restricted diffusion. No other restricted diffusion. Moderate chronic microvascular ischemic disease in the cerebral white matter. No hydrocephalus. No mass effect or midline shift. No extra-axial fluid  collection. The midline structures, including the cervicomedullary junction, are within  normal limits. Brain MRA:  The vertebral arteries are codominant. The basilar artery and its  branches are normal. There is dissection in the distal left internal carotid  artery, just proximal to the petrous portion. Distal right internal carotid  artery is patent. Anterior and middle cerebral arteries are patent. . There is no  flow-limiting intracranial stenosis. There is no aneurysm. There are no sizable  posterior communicating arteries. Neck MRA: there is no flow-limiting stenosis. Bilateral common carotid arteries  are patent bilateral carotid bifurcations are patent. Distal left internal  carotid artery dissection is obscured by artifact. Right internal carotid artery  is patent. All measurements utilize NASCET criteria. Impression IMPRESSION:  1. 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 2. Moderate chronic microvascular ischemic disease. 3. Focal dissection of the distal left internal carotid artery proximal to the  petrous portion. 4. Limited MR angiography neck due to technique.    Recommendation: CT angiography head and neck. I discussed this impression with BARRY Francisco Mai at 1049 hours on 2/24/2019. MRA BRAIN WO CONT    Narrative EXAM:  MRI BRAIN WO CONT, MRA NECK WO CONT, MRA BRAIN WO CONT    INDICATION:  Right upper extremity weakness. Intermittent dizziness. Parkinson's  disease. COMPARISON: None. TECHNIQUE: Multisequence, multiplanar MRI of the brain without contrast.  Noncontrast time of flight MR angiography of the head. Noncontrast  time-of-flight MR angiography of the neck. (3 separate studies reported  together) Maximum intensity projection reformats of the MR angiography. Examination  performed on the 3 Parul magnet. FINDINGS: MRI Cherylene Plank: Multiple small areas of restricted diffusion in the  posterior left MCA territory involving the left parietal lobe and postcentral  gyrus. There is associated hyperintense T2/flair signal in these areas of  restricted diffusion. No other restricted diffusion. Moderate chronic microvascular ischemic disease in the cerebral white matter. No hydrocephalus. No mass effect or midline shift. No extra-axial fluid  collection. The midline structures, including the cervicomedullary junction, are within  normal limits. Brain MRA:  The vertebral arteries are codominant. The basilar artery and its  branches are normal. There is dissection in the distal left internal carotid  artery, just proximal to the petrous portion. Distal right internal carotid  artery is patent. Anterior and middle cerebral arteries are patent. . There is no  flow-limiting intracranial stenosis. There is no aneurysm. There are no sizable  posterior communicating arteries. Neck MRA: there is no flow-limiting stenosis. Bilateral common carotid arteries  are patent bilateral carotid bifurcations are patent. Distal left internal  carotid artery dissection is obscured by artifact. Right internal carotid artery  is patent. All measurements utilize NASCET criteria.       Impression IMPRESSION:  1. 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 2. Moderate chronic microvascular ischemic disease. 3. Focal dissection of the distal left internal carotid artery proximal to the  petrous portion. 4. Limited MR angiography neck due to technique. Recommendation: CT angiography head and neck. I discussed this impression with BARRY Villalpando at 1049 hours on 2/24/2019. MRA NECK WO CONT    Narrative EXAM:  MRI BRAIN WO CONT, MRA NECK WO CONT, MRA BRAIN WO CONT    INDICATION:  Right upper extremity weakness. Intermittent dizziness. Parkinson's  disease. COMPARISON: None. TECHNIQUE: Multisequence, multiplanar MRI of the brain without contrast.  Noncontrast time of flight MR angiography of the head. Noncontrast  time-of-flight MR angiography of the neck. (3 separate studies reported  together) Maximum intensity projection reformats of the MR angiography. Examination  performed on the 3 Parul magnet. FINDINGS: MRI [facility]: Multiple small areas of restricted diffusion in the  posterior left MCA territory involving the left parietal lobe and postcentral  gyrus. There is associated hyperintense T2/flair signal in these areas of  restricted diffusion. No other restricted diffusion. Moderate chronic microvascular ischemic disease in the cerebral white matter. No hydrocephalus. No mass effect or midline shift. No extra-axial fluid  collection. The midline structures, including the cervicomedullary junction, are within  normal limits. Brain MRA:  The vertebral arteries are codominant. The basilar artery and its  branches are normal. There is dissection in the distal left internal carotid  artery, just proximal to the petrous portion. Distal right internal carotid  artery is patent. Anterior and middle cerebral arteries are patent. . There is no  flow-limiting intracranial stenosis. There is no aneurysm.  There are no sizable  posterior communicating arteries. Neck MRA: there is no flow-limiting stenosis. Bilateral common carotid arteries  are patent bilateral carotid bifurcations are patent. Distal left internal  carotid artery dissection is obscured by artifact. Right internal carotid artery  is patent. All measurements utilize NASCET criteria. Impression IMPRESSION:  1. 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 2. Moderate chronic microvascular ischemic disease. 3. Focal dissection of the distal left internal carotid artery proximal to the  petrous portion. 4. Limited MR angiography neck due to technique. Recommendation: CT angiography head and neck. I discussed this impression with BARRY Varela at 1049 hours on 2/24/2019. Lab Review   Recent Results (from the past 24 hour(s))   PLATELET FUNCTION, VERIFY NOW P2Y12    Collection Time: 02/28/19  7:05 AM   Result Value Ref Range    P2Y12 Plt response 125 (L) 194 - 418 PRU       Additional comments:I reviewed the patient's new clinical lab test results. Patient Vitals for the past 8 hrs:   BP Temp Pulse Resp SpO2   02/28/19 0912 120/58 -- 84 -- --   02/28/19 0600 175/79 98.1 °F (36.7 °C) 68 18 97 %       No intake/output data recorded. No intake/output data recorded. Exam:  Visit Vitals  /58   Pulse 84   Temp 98.1 °F (36.7 °C)   Resp 18   Ht 5' 1\" (1.549 m)   Wt 80.8 kg (178 lb 3.2 oz)   SpO2 97%   Breastfeeding? No   BMI 33.67 kg/m²     Gen: Well developed, sitting up in bed drinking water  CV: RRR  Lungs: non labored breathing  Abd: soft, non distended  Neuro: A&O x 3, mild slurred speech.   CN II-XII: PERRL, EOMI, face asymmetric, tongue/palate midline, reduced blink rate and masklike face  Motor: Right arm 4+ left arm 5 lower extremities 4+ symmetric,   Sensory: Grossly intact to LT  DTRs: symmetric  COOR: no limb/truncal ataxia  Gait: Deferred due to condition  Slight resting tremor right arm        PROBLEM LIST:     Patient Active Problem List   Diagnosis Code    Cerebrovascular accident (CVA) due to embolism of left carotid artery ([facility] [address] H54.980    Internal carotid artery dissection (HCC) I77.71    Orthostatic hypotension I95.1    [facility] G20       Assessment/Plan:      57-year-old woman who had left-sided infarct probably due to her carotid dissection. No intervention will optimize medical management. Continue baby aspirin 81 mg and Plavix 150 mg daily. She is not therapeutic with 75 mg. She will need to follow-up in the dissection clinic in neuro interventional.  Okay to follow-up with neurology thereafter and she will [facility]         Signed:  [address], DO  2/28/2019  9:27 AM

## 2019-02-28 NOTE — ACP (ADVANCE CARE PLANNING)
Advance Care Planning Note    Name: Anupam Womack  YOB: 1935  MRN: [id number]  Admission Date: 2/23/2019  6:25 PM    Date of discussion: 2/28/2019    Active Diagnoses:    Hospital Problems  Date Reviewed: 2/28/2019          Codes Class Noted POA    Orthostatic hypotension ICD-10-CM: I95.1  ICD-9-CM: 458.0  2/28/2019 Unknown        Parkinson disease ([facility] [address] ICD-10-CM: Karlene Ary  ICD-9-CM: 332.0  2/28/2019 Unknown        Internal carotid artery dissection [facility]) ICD-10-CM: I77.71  ICD-9-CM: 443.21  2/24/2019 Unknown        * (Principal) Cerebrovascular accident (CVA) due to embolism of left carotid artery ([facility] [address] ICD-10-CM: O63.457  ICD-9-CM: 434.11  2/23/2019 Yes               These active diagnoses are of sufficient risk that focused discussion on advance care planning is indicated in order to allow the patient to thoughtfully consider personal goals of care, and if situations arise that prevent the ability to personally give input, to ensure appropriate representation of their personal desires for different levels and aggressiveness of care. Discussion:     Persons present and participating in discussion: Tashi Nieves MD,     Discussion:   Provided overview of current medical conditions including her diagnosis of acute stroke, and previously diagnosed parkinson's disease. Discussed meaning of code status, and liekly poor success of CPR given her chronic medical conditions, age, and disability. Moreover if CPR is successful discussed that there is a high possibility that she will suffer permamnet neurologic disability. Pt expressed desire to attempt CPR, with this knowledge in mind. She would name all three of her children her medical POA's, who would collectively make decisions for her. She also expressed desire not to be on a ventilator, and not be placed on \"machines\".  Discussed that she should make her wishes clear with her children, for which she agreed. Requested to possibly fill out an advanced directive, however patient is refusing at this time. - Remains FULL CODE  - mPOA all 3 children. Time Spent:     Total time spent face-to-face in education and discussion: 16 minutes.      Eladio Guzman MD  2/28/2019  9:08 AM

## 2019-02-28 NOTE — DISCHARGE INSTRUCTIONS
Discharge SNF/Rehab Instructions/LTAC       PATIENT ID: Ladan Suresh  MRN: [id number]   YOB: 1935    DATE OF ADMISSION: 2/23/2019  6:25 PM    DATE OF DISCHARGE: 2/28/2019    PRIMARY CARE PROVIDER: Hema Albarran MD       ATTENDING PHYSICIAN: Radha Zamora*  DISCHARGING PROVIDER: Fay Bryan MD     To contact this individual call [phone] and ask the  to page. If unavailable ask to be transferred the Adult Hospitalist Department. 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 DISCHARGE DIAGNOSES / PLAN:      Acute CVA: Pt with Focal dissection of the distal left internal carotid artery proximal to the petrous portion. And assoc:Late acute versus subacute left MCA territory infarcts involve the left parietal lobe and sensory cortex. - Secondary to distal L ICA dissection  - On ASA,   - Plavis was subtherapeutic on 75mg, therapeutic on 150mg discharge on 150mg daily. - Continue Statin   - No acute intervention with dissection     Orthostatic hypotension - holding antihypertensives, on low dose midodrine, repeat orthostatics tomorrow, s/p IV hydration  - Improvement with PT with low dose midodrine. HOLD if BP over 382 systolic.    - Allow permissive HTN given large drop in BP with standing.   - Continue to hold all BP medications.      Parkinsons - Likely contributing to dizziness, and dysautonomia  - Continue Sinemet  - Neurology following     Constipation - miralax   HLD - statin  GERD - PPI         PENDING TEST RESULTS:   At the time of discharge the following test results are still pending: None    FOLLOW UP APPOINTMENTS:    Follow-up Information     Follow up With Specialties Details Why Contact Info    [facility] [address] [place]    Carmen Julio MD Family Practice In 1 week Hospital follow up  [facility]  [address] [facility] [address] [phone]      Please call your neurologist for hospital follow up within 1 month. ADDITIONAL CARE RECOMMENDATIONS:   1. Please take all medications as prescribed. Note changes as below. - Increase plavix to 150mg  - Add midodrine, but hold if BP is elevated over 140  2. Please make sure to follow up with your primary care physician within 1-2 weeks of discharge for hospital follow up. You also should follow up with your neurologist.   3. Please get up very slowly when standing, you should take at least 5 minutes before standing. 4. Please make sure to continue measuring your blood pressure at home, about 1-2 times per day at different times and positions. DIET: Regular Diet    ACTIVITY: PT/OT Eval and Treat    WOUND CARE: None    EQUIPMENT needed: per PT/OT        DISCHARGE MEDICATIONS:   See Medication Reconciliation Form      NOTIFY YOUR PHYSICIAN FOR ANY OF THE FOLLOWING:   Fever over 101 degrees for 24 hours. Chest pain, shortness of breath, fever, chills, nausea, vomiting, diarrhea, change in mentation, falling, weakness, bleeding. Severe pain or pain not relieved by medications. Or, any other signs or symptoms that you may have questions about.     DISPOSITION:    Home With:   OT  PT  HH  RN      X SNF/Inpatient Rehab/LTAC    Independent/assisted living    Hospice    Other:       PATIENT CONDITION AT DISCHARGE:     Functional status    Poor    X Deconditioned     Independent      Cognition    X Lucid     Forgetful     Dementia      Catheters/lines (plus indication)    Tomas     PICC     PEG    X None      Code status   X  Full code     DNR    PHYSICAL EXAMINATION AT DISCHARGE:  Visit Vitals  /79 (BP 1 Location: Right arm, BP Patient Position: Post activity)   Pulse 68   Temp 98.1 °F (36.7 °C)   Resp 18   Ht 5' 1\" (1.549 m)   Wt 80.8 kg (178 lb 3.2 oz)   SpO2 97%   Breastfeeding? No   BMI 33.67 kg/m²     Gen: NAD, awake in bed  HEENT: NC/AT, sclera anicteric, PERRL, EOMI  CV: RRR no m/r/g  Resp: CTA b/l no increased work of breathing  Abd: NT/ND, normal bowel sounds  Ext: 2+ pulses, no edema,  Neuro: RUE weakness, + tremor, rigidity   Skin: No rashes        CHRONIC MEDICAL DIAGNOSES:  Problem List as of 2/28/2019 Date Reviewed: 2/28/2019          Codes Class Noted - Resolved    Orthostatic hypotension ICD-10-CM: I95.1  ICD-9-CM: 458.0  2/28/2019 - Present        Parkinson disease ([facility] ICD-10-CM: Kiara Venancio  ICD-9-CM: 332.0  2/28/2019 - Present        Internal carotid artery dissection (HCC) ICD-10-CM: I77.71  ICD-9-CM: 443.21  2/24/2019 - Present        * (Principal) Cerebrovascular accident (CVA) due to embolism of left carotid artery ([facility] ICD-10-CM: P32.580  ICD-9-CM: 434.11  2/23/2019 - Present                CDMP Checked: Yes X     PROBLEM LIST Updated:   Yes X         Signed:   Troy Thomas MD  2/28/2019  9:06 AM

## 2019-02-28 NOTE — DISCHARGE SUMMARY
Discharge Summary       PATIENT ID: Ines Vang  MRN: [id number]   YOB: 1935    DATE OF ADMISSION: 2/23/2019  6:25 PM    DATE OF DISCHARGE: 02/28/2019   PRIMARY CARE PROVIDER: Perry Trevino MD     ATTENDING PHYSICIAN: Trevor Bryant MD  DISCHARGING PROVIDER: Trevor Bryant MD    To contact this individual call [phone] and ask the  to page. If unavailable ask to be transferred the Adult Hospitalist Department. 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 80year old female, with a hx of TIAs, parkinson's disease, orthostatic hypotension, HTN, CHF presents to \[facility]\"" with right upper extremity weakness since 10:30 AM today. Initially patient said she can't move the right arm at all, arrived in ED, The symptoms improved and able to raise against the gravity. No TPA offered. She denies headache, CP, SOB, lightheadedness, numbness or tingling. CTA revealed small distal L ICA dissection, MRI revealed late acute vs. Subacute L MCA infarcts. PT/OT worked with patient and recommended inpatient rehab. Throughout her stay she had severe orthostasis, which significantly impacted her quality of life, and ability to participate with PT. Her BP medications were subsequently stopped, which resulted in improvement in her symptoms. Unfortunately this does run the risk of recurrent CVA however, these risks were discussed with the patient. DISCHARGE DIAGNOSES / PLAN:      Acute CVA: Pt with Focal dissection of the distal left internal carotid artery proximal to the petrous portion. And assoc:Late acute versus subacute left MCA territory infarcts involve the left parietal lobe and sensory cortex.  - Secondary to distal L ICA dissection  - On ASA,   - Plavis was subtherapeutic on 75mg, therapeutic on 150mg discharge on 150mg daily. - Continue Statin   - No acute intervention with dissection     Orthostatic hypotension - holding antihypertensives, on low dose midodrine, repeat orthostatics tomorrow, s/p IV hydration  - Improvement with PT with low dose midodrine. HOLD if BP over 447 systolic. - Allow permissive HTN given large drop in BP with standing.   - Continue to hold all BP medications.      Parkinsons - Likely contributing to dizziness, and dysautonomia  - Continue Sinemet  - Neurology following     Constipation - miralax   HLD - statin  GERD - PPI     ADDITIONAL CARE RECOMMENDATIONS:   1. Please take all medications as prescribed. Note changes as below. - Increase plavix to 150mg  - Add midodrine, but hold if BP is elevated over 140  2. Please make sure to follow up with your primary care physician within 1-2 weeks of discharge for hospital follow up. You also should follow up with your neurologist.   3. Please get up very slowly when standing, you should take at least 5 minutes before standing. 4. Please make sure to continue measuring your blood pressure at home, about 1-2 times per day at different times and positions. PENDING TEST RESULTS:   At the time of discharge the following test results are still pending: None    FOLLOW UP APPOINTMENTS:    Follow-up Information     Follow up With Specialties Details Why [address] [facility]    Ruby Otero MD Family Practice In 1 week Hospital follow up  [facility]  [address] [facility] [phone]      Please call your neurologist for hospital follow up within 1 month.         Loyd Brady MD Neurology In 2 weeks [address] [facility]  [address] [facility] [address]  [phone]      Guero Singleton MD Interventional Radiology and Diagnostic Radiology In 1 month Follow up with nuerointerventional surgery regarding your carotid dissection [address]  [phone]               DIET: Regular Diet    ACTIVITY: PT/OT Eval and Treat    WOUND CARE: None    EQUIPMENT needed: per PT/OT      DISCHARGE MEDICATIONS:  Current Discharge Medication List      START taking these medications    Details   aspirin 81 mg chewable tablet Take 1 Tab by mouth daily for 30 days. Qty: 30 Tab, Refills: 0      carbidopa-levodopa (SINEMET)  mg per tablet Take 1 Tab by mouth three (3) times daily for 30 days. Qty: 90 Tab, Refills: 0      clopidogrel (PLAVIX) 75 mg tab Take 2 Tabs by mouth daily for 30 days. Qty: 60 Tab, Refills: 0      midodrine (PROAMITINE) 2.5 mg tablet Take 1 Tab by mouth three (3) times daily (with meals) for 30 days. Qty: 90 Tab, Refills: 0    Comments: Please hold if systolic BP is over [id number]      atorvastatin (LIPITOR) 40 mg tablet Take 1 Tab by mouth daily. Qty: 30 Tab, Refills: 0         CONTINUE these medications which have NOT CHANGED    Details   ferrous sulfate 325 mg (65 mg iron) tablet Take 325 mg by mouth two (2) times a day. Dexlansoprazole (DEXILANT) 60 mg CpDM Take 60 mg by mouth daily. sucralfate (CARAFATE) 1 gram tablet Take 1 g by mouth three (3) times daily (with meals). ondansetron (ZOFRAN ODT) 4 mg disintegrating tablet Take 4 mg by mouth every eight (8) hours as needed for Nausea. fluticasone-salmeterol (ADVAIR HFA) 115-21 mcg/actuation inhaler Take 1 Puff by inhalation nightly. polyethylene glycol (MIRALAX) 17 gram packet Take 17 g by mouth as needed. acetaminophen (TYLENOL ARTHRITIS PAIN) 650 mg CR tablet Take 650 mg by mouth every six (6) hours as needed for Pain.          STOP taking these medications       furosemide (LASIX) 40 mg tablet Comments: Reason for Stopping:         potassium chloride SR (KLOR-CON 10) 10 mEq tablet Comments:   Reason for Stopping:         lisinopril (PRINIVIL, ZESTRIL) 2.5 mg tablet Comments:   Reason for Stopping:                 NOTIFY YOUR PHYSICIAN FOR ANY OF THE FOLLOWING:   Fever over 101 degrees for 24 hours. Chest pain, shortness of breath, fever, chills, nausea, vomiting, diarrhea, change in mentation, falling, weakness, bleeding. Severe pain or pain not relieved by medications. Or, any other signs or symptoms that you may have questions about. DISPOSITION:    Home With:   OT  PT  HH  RN      X Long term SNF/Inpatient Rehab    Independent/assisted living    Hospice    Other:       PATIENT CONDITION AT DISCHARGE:     Functional status    Poor    X Deconditioned     Independent      Cognition    X Lucid     Forgetful     Dementia      Catheters/lines (plus indication)    Tomas     PICC     PEG    X None      Code status    X Full code     DNR      PHYSICAL EXAMINATION AT DISCHARGE:  Visit Vitals  /79 (BP 1 Location: Right arm, BP Patient Position: Post activity)   Pulse 68   Temp 98.1 °F (36.7 °C)   Resp 18   Ht 5' 1\" (1.549 m)   Wt 80.8 kg (178 lb 3.2 oz)   SpO2 97%   Breastfeeding?  No   BMI 33.67 kg/m²     Gen: NAD, awake in bed  HEENT: NC/AT, sclera anicteric, PERRL, EOMI  CV: RRR no m/r/g  Resp: CTA b/l no increased work of breathing  Abd: NT/ND, normal bowel sounds  Ext: 2+ pulses, no edema,  Neuro: RUE weakness, + tremor, rigidity   Skin: No rashes        CHRONIC MEDICAL DIAGNOSES:  Problem List as of 2/28/2019 Never Reviewed          Codes Class Noted - Resolved    Internal carotid artery dissection [facility]) ICD-10-CM: I77.71  ICD-9-CM: 443.21  2/24/2019 - Present        * (Principal) Cerebrovascular accident (CVA) due to embolism of left carotid artery ([facility] [address] ICD-10-CM: P50.418  ICD-9-CM: 434.11  2/23/2019 - Present              Greater than 30 minutes were spent with the patient on counseling and coordination of care    Signed:   Candace Carroll MD  2/28/2019  8:22 AM

## 2019-02-28 NOTE — ROUTINE PROCESS
Stroke Education documented in Patient Education: YES  Core Measures Documented in Connect [facility]:  Risk Factors: YES  Warning signs of stroke: YES  When to Activate 911: YES  Medication Education for Risk Factors: YES  Smoking cessation if applicable: YES  Written Education Given:  YES    Discharge NIH Completed: YES  Score: 1    BRAINS: YES    Follow Up Appointment Made: YES  Date/Time if applicable: 2 wk Bedside RN performed patient education and medication education. Discharge concerns initiated and discussed with patient, including clarification on \"who\" assists the patient at their home and instructions for when the home going patient should call their provider after discharge. Opportunity for questions and clarification was provided. Patient receptive to education: YES  Patient stated: I understand things will be different  Barriers to Education: none   Diagnosis Education given:  YES    Length of stay: 5  Expected Day of Discharge: 5  Ask if they have \"Help at Home\" & add to white board? YES    Education Day #: 5    Medication Education Given:  YES  M in the box Medication name: Midodrine    Pt aware of HCAHPS survey: YES    TRANSFER - OUT REPORT:    Verbal report given to Elyse(name) on Len Jean-Baptiste  being transferred to [facility](unit) for routine progression of care       Report consisted of patients Situation, Background, Assessment and   Recommendations(SBAR). Information from the following report(s) SBAR, ED Summary, [facility] and Recent Results was reviewed with the receiving nurse. Lines:       Opportunity for questions and clarification was provided.       Patient transported with:   [employer]

## 2019-02-28 NOTE — PROGRESS NOTES
Problem: Pressure Injury - Risk of  Goal: *Prevention of pressure injury  Document Salty Scale and appropriate interventions in the flowsheet. Outcome: Progressing Towards Goal  Pressure Injury Interventions:  Sensory Interventions: Assess changes in LOC, Minimize linen layers         Activity Interventions: Assess need for specialty bed, PT/OT evaluation    Mobility Interventions: Assess need for specialty bed, PT/OT evaluation, Turn and reposition approx.  every two hours(pillow and wedges)    Nutrition Interventions: Document food/fluid/supplement intake, Discuss nutritional consult with provider    Friction and Shear Interventions: Apply protective barrier, creams and emollients, Minimize layers, Lift sheet

## 2019-02-28 NOTE — PROGRESS NOTES
SHANI met with attending and she stated that this pt is ready for d/c to [facility] today. SHANI spoke with neil Krause for [facility] and she can accept this pt at any time today. SHANI set up [facility] for \"will call\" yesteday. SHANI spoke with Luis Olivares with this company and they can transport pt at noon today. SHANI met with pt to inform her as well as her son, Julius Bloom ([phone]). Both are in agreement with this plan. An envelope containing pt's kardex, MAR, AVS and emtala will be sent with pt to [facility] today. Also, pt's nurse will call report.  Nathaniel Hathaway

## 2019-02-28 NOTE — PROGRESS NOTES
Pharmacist Discharge Medication Reconciliation    Discharging Provider: Dr. Zenaida Olson PMH:   Past Medical History:   Diagnosis Date    GERD (gastroesophageal reflux disease)     Heart failure ([address]     Hypertension      Chief Complaint for this Admission: No chief complaint on file. Allergies: Patient has no known allergies. Discharge Medications:   Current Discharge Medication List        START taking these medications    Details   aspirin 81 mg chewable tablet Take 1 Tab by mouth daily for 30 days. Qty: 30 Tab, Refills: 0      carbidopa-levodopa (SINEMET)  mg per tablet Take 1 Tab by mouth three (3) times daily for 30 days. Qty: 90 Tab, Refills: 0      clopidogrel (PLAVIX) 75 mg tab Take 2 Tabs by mouth daily for 30 days. Qty: 60 Tab, Refills: 0      midodrine (PROAMITINE) 2.5 mg tablet Take 1 Tab by mouth three (3) times daily (with meals) for 30 days. Qty: 90 Tab, Refills: 0    Comments: Please hold if systolic BP is over [id number]      atorvastatin (LIPITOR) 40 mg tablet Take 1 Tab by mouth daily. Qty: 30 Tab, Refills: 0           CONTINUE these medications which have NOT CHANGED    Details   ferrous sulfate 325 mg (65 mg iron) tablet Take 325 mg by mouth two (2) times a day. Dexlansoprazole (DEXILANT) 60 mg CpDM Take 60 mg by mouth daily. sucralfate (CARAFATE) 1 gram tablet Take 1 g by mouth three (3) times daily (with meals). ondansetron (ZOFRAN ODT) 4 mg disintegrating tablet Take 4 mg by mouth every eight (8) hours as needed for Nausea. fluticasone-salmeterol (ADVAIR HFA) 115-21 mcg/actuation inhaler Take 1 Puff by inhalation nightly. polyethylene glycol (MIRALAX) 17 gram packet Take 17 g by mouth as needed. acetaminophen (TYLENOL ARTHRITIS PAIN) 650 mg CR tablet Take 650 mg by mouth every six (6) hours as needed for Pain.            STOP taking these medications       furosemide (LASIX) 40 mg tablet Comments:   Reason for Stopping: potassium chloride SR (KLOR-CON 10) 10 mEq tablet Comments:   Reason for Stopping:         lisinopril (PRINIVIL, ZESTRIL) 2.5 mg tablet Comments:   Reason for Stopping:               The patient's chart, MAR and AVS were reviewed by Bart Saint.

## 2019-02-28 NOTE — ROUTINE PROCESS
Bedside shift change report given to Vero Foster (oncoming nurse) by Veda Benavides (offgoing nurse). Report included the following information SBAR, ED Summary, MAR, Recent Results and Cardiac Rhythm NSR.

## 2019-03-25 ENCOUNTER — TELEPHONE (OUTPATIENT)
Dept: NEUROSURGERY | Age: 84
End: 2019-03-25

## 2019-03-25 NOTE — TELEPHONE ENCOUNTER
Spoke to patient regarding her appointment today. Patient stated she did not have it on her calendar. She did not want to reschedule at this time. She will need to check with her  and call the office back.

## 2021-03-08 ENCOUNTER — IMMUNIZATION (OUTPATIENT)
Dept: INTERNAL MEDICINE CLINIC | Age: 86
End: 2021-03-08
Payer: MEDICARE

## 2021-03-08 DIAGNOSIS — Z23 ENCOUNTER FOR IMMUNIZATION: Primary | ICD-10-CM

## 2021-03-08 PROCEDURE — 91300 COVID-19, MRNA, LNP-S, PF, 30MCG/0.3ML DOSE(PFIZER): CPT | Performed by: FAMILY MEDICINE

## 2021-03-08 PROCEDURE — 0001A COVID-19, MRNA, LNP-S, PF, 30MCG/0.3ML DOSE(PFIZER): CPT | Performed by: FAMILY MEDICINE

## 2021-03-29 ENCOUNTER — IMMUNIZATION (OUTPATIENT)
Dept: INTERNAL MEDICINE CLINIC | Age: 86
End: 2021-03-29
Payer: MEDICARE

## 2021-03-29 DIAGNOSIS — Z23 ENCOUNTER FOR IMMUNIZATION: Primary | ICD-10-CM

## 2021-03-29 PROCEDURE — 91300 COVID-19, MRNA, LNP-S, PF, 30MCG/0.3ML DOSE(PFIZER): CPT | Performed by: FAMILY MEDICINE

## 2021-03-29 PROCEDURE — 0002A COVID-19, MRNA, LNP-S, PF, 30MCG/0.3ML DOSE(PFIZER): CPT | Performed by: FAMILY MEDICINE

## 2022-06-06 ENCOUNTER — APPOINTMENT (OUTPATIENT)
Dept: CT IMAGING | Age: 87
DRG: 956 | End: 2022-06-06
Attending: EMERGENCY MEDICINE
Payer: MEDICARE

## 2022-06-06 ENCOUNTER — HOSPITAL ENCOUNTER (INPATIENT)
Age: 87
LOS: 8 days | Discharge: NURSING, REHAB OR LONG-TERM CARE | DRG: 956 | End: 2022-06-14
Attending: EMERGENCY MEDICINE | Admitting: HOSPITALIST
Payer: MEDICARE

## 2022-06-06 ENCOUNTER — APPOINTMENT (OUTPATIENT)
Dept: GENERAL RADIOLOGY | Age: 87
DRG: 956 | End: 2022-06-06
Attending: EMERGENCY MEDICINE
Payer: MEDICARE

## 2022-06-06 ENCOUNTER — APPOINTMENT (OUTPATIENT)
Dept: GENERAL RADIOLOGY | Age: 87
DRG: 956 | End: 2022-06-06
Attending: HOSPITALIST
Payer: MEDICARE

## 2022-06-06 DIAGNOSIS — S09.90XA CLOSED HEAD INJURY, INITIAL ENCOUNTER: ICD-10-CM

## 2022-06-06 DIAGNOSIS — S72.001A CLOSED FRACTURE OF RIGHT HIP, INITIAL ENCOUNTER (HCC): Primary | ICD-10-CM
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
## 2022-06-06 DIAGNOSIS — S06.5XAA SUBDURAL HEMATOMA: ICD-10-CM

## 2022-06-06 PROBLEM — S72.009A HIP FRACTURE (HCC): Status: ACTIVE | Noted: 2022-06-06

## 2022-06-06 LAB
ABO + RH BLD: NORMAL
ANION GAP SERPL CALC-SCNC: 5 MMOL/L (ref 5–15)
APPEARANCE UR: CLEAR
BACTERIA URNS QL MICRO: NEGATIVE /HPF
BASOPHILS # BLD: 0 K/UL (ref 0–0.1)
BASOPHILS NFR BLD: 1 % (ref 0–1)
BILIRUB UR QL: NEGATIVE
BLOOD GROUP ANTIBODIES SERPL: NORMAL
BUN SERPL-MCNC: 16 MG/DL (ref 6–20)
BUN/CREAT SERPL: 18 (ref 12–20)
CALCIUM SERPL-MCNC: 9.6 MG/DL (ref 8.5–10.1)
CHLORIDE SERPL-SCNC: 106 MMOL/L (ref 97–108)
CO2 SERPL-SCNC: 31 MMOL/L (ref 21–32)
COLOR UR: NORMAL
COMMENT, HOLDF: NORMAL
COMMENT, HOLDF: NORMAL
CREAT SERPL-MCNC: 0.88 MG/DL (ref 0.55–1.02)
DIFFERENTIAL METHOD BLD: ABNORMAL
EOSINOPHIL # BLD: 0.1 K/UL (ref 0–0.4)
EOSINOPHIL NFR BLD: 1 % (ref 0–7)
EPITH CASTS URNS QL MICRO: NORMAL /LPF
ERYTHROCYTE [DISTWIDTH] IN BLOOD BY AUTOMATED COUNT: 15.7 % (ref 11.5–14.5)
GLUCOSE SERPL-MCNC: 115 MG/DL (ref 65–100)
GLUCOSE UR STRIP.AUTO-MCNC: NEGATIVE MG/DL
HCT VFR BLD AUTO: 39.6 % (ref 35–47)
HGB BLD-MCNC: 12.4 G/DL (ref 11.5–16)
HGB UR QL STRIP: NEGATIVE
HYALINE CASTS URNS QL MICRO: NORMAL /LPF (ref 0–5)
IMM GRANULOCYTES # BLD AUTO: 0 K/UL (ref 0–0.04)
IMM GRANULOCYTES NFR BLD AUTO: 1 % (ref 0–0.5)
INR PPP: 1 (ref 0.9–1.1)
KETONES UR QL STRIP.AUTO: NEGATIVE MG/DL
LEUKOCYTE ESTERASE UR QL STRIP.AUTO: NEGATIVE
LYMPHOCYTES # BLD: 2.3 K/UL (ref 0.8–3.5)
LYMPHOCYTES NFR BLD: 55 % (ref 12–49)
MCH RBC QN AUTO: 27.9 PG (ref 26–34)
MCHC RBC AUTO-ENTMCNC: 31.3 G/DL (ref 30–36.5)
MCV RBC AUTO: 89.2 FL (ref 80–99)
MONOCYTES # BLD: 0.4 K/UL (ref 0–1)
MONOCYTES NFR BLD: 10 % (ref 5–13)
NEUTS SEG # BLD: 1.4 K/UL (ref 1.8–8)
NEUTS SEG NFR BLD: 32 % (ref 32–75)
NITRITE UR QL STRIP.AUTO: NEGATIVE
NRBC # BLD: 0 K/UL (ref 0–0.01)
NRBC BLD-RTO: 0 PER 100 WBC
PH UR STRIP: 7.5 [PH] (ref 5–8)
PLATELET # BLD AUTO: 230 K/UL (ref 150–400)
PMV BLD AUTO: 9.4 FL (ref 8.9–12.9)
POTASSIUM SERPL-SCNC: 4.4 MMOL/L (ref 3.5–5.1)
PROT UR STRIP-MCNC: NEGATIVE MG/DL
PROTHROMBIN TIME: 10.3 SEC (ref 9–11.1)
RBC # BLD AUTO: 4.44 M/UL (ref 3.8–5.2)
RBC #/AREA URNS HPF: NORMAL /HPF (ref 0–5)
SAMPLES BEING HELD,HOLD: NORMAL
SAMPLES BEING HELD,HOLD: NORMAL
SODIUM SERPL-SCNC: 142 MMOL/L (ref 136–145)
SP GR UR REFRACTOMETRY: 1.01 (ref 1–1.03)
SPECIMEN EXP DATE BLD: NORMAL
UA: UC IF INDICATED,UAUC: NORMAL
UROBILINOGEN UR QL STRIP.AUTO: 0.2 EU/DL (ref 0.2–1)
WBC # BLD AUTO: 4.2 K/UL (ref 3.6–11)
WBC URNS QL MICRO: NORMAL /HPF (ref 0–4)

## 2022-06-06 PROCEDURE — 73521 X-RAY EXAM HIPS BI 2 VIEWS: CPT

## 2022-06-06 PROCEDURE — 94760 N-INVAS EAR/PLS OXIMETRY 1: CPT

## 2022-06-06 PROCEDURE — 94761 N-INVAS EAR/PLS OXIMETRY MLT: CPT

## 2022-06-06 PROCEDURE — 74011250637 HC RX REV CODE- 250/637: Performed by: HOSPITALIST

## 2022-06-06 PROCEDURE — 85025 COMPLETE CBC W/AUTO DIFF WBC: CPT

## 2022-06-06 PROCEDURE — 99285 EMERGENCY DEPT VISIT HI MDM: CPT

## 2022-06-06 PROCEDURE — 65270000046 HC RM TELEMETRY

## 2022-06-06 PROCEDURE — 70450 CT HEAD/BRAIN W/O DYE: CPT

## 2022-06-06 PROCEDURE — 74011000250 HC RX REV CODE- 250: Performed by: HOSPITALIST

## 2022-06-06 PROCEDURE — 80048 BASIC METABOLIC PNL TOTAL CA: CPT

## 2022-06-06 PROCEDURE — 36415 COLL VENOUS BLD VENIPUNCTURE: CPT

## 2022-06-06 PROCEDURE — 74011250637 HC RX REV CODE- 250/637: Performed by: EMERGENCY MEDICINE

## 2022-06-06 PROCEDURE — 81001 URINALYSIS AUTO W/SCOPE: CPT

## 2022-06-06 PROCEDURE — 73600 X-RAY EXAM OF ANKLE: CPT

## 2022-06-06 PROCEDURE — 85610 PROTHROMBIN TIME: CPT

## 2022-06-06 PROCEDURE — 74011250636 HC RX REV CODE- 250/636: Performed by: HOSPITALIST

## 2022-06-06 PROCEDURE — 71045 X-RAY EXAM CHEST 1 VIEW: CPT

## 2022-06-06 PROCEDURE — 86900 BLOOD TYPING SEROLOGIC ABO: CPT

## 2022-06-06 RX ORDER — CARBIDOPA AND LEVODOPA 25; 250 MG/1; MG/1
1 TABLET ORAL 2 TIMES DAILY
Status: DISCONTINUED | OUTPATIENT
Start: 2022-06-06 | End: 2022-06-06

## 2022-06-06 RX ORDER — POLYETHYLENE GLYCOL 3350 17 G/17G
17 POWDER, FOR SOLUTION ORAL DAILY
Status: DISCONTINUED | OUTPATIENT
Start: 2022-06-06 | End: 2022-06-14 | Stop reason: HOSPADM

## 2022-06-06 RX ORDER — MORPHINE SULFATE 2 MG/ML
1 INJECTION, SOLUTION INTRAMUSCULAR; INTRAVENOUS
Status: DISCONTINUED | OUTPATIENT
Start: 2022-06-06 | End: 2022-06-14 | Stop reason: HOSPADM

## 2022-06-06 RX ORDER — SODIUM CHLORIDE 9 MG/ML
75 INJECTION, SOLUTION INTRAVENOUS CONTINUOUS
Status: DISCONTINUED | OUTPATIENT
Start: 2022-06-06 | End: 2022-06-09

## 2022-06-06 RX ORDER — CARBIDOPA AND LEVODOPA 25; 100 MG/1; MG/1
1 TABLET, EXTENDED RELEASE ORAL
Status: DISCONTINUED | OUTPATIENT
Start: 2022-06-06 | End: 2022-06-14 | Stop reason: HOSPADM

## 2022-06-06 RX ORDER — CLOPIDOGREL BISULFATE 75 MG/1
75 TABLET ORAL
COMMUNITY
End: 2022-06-14

## 2022-06-06 RX ORDER — LEVETIRACETAM 500 MG/5ML
500 INJECTION, SOLUTION, CONCENTRATE INTRAVENOUS EVERY 12 HOURS
Status: DISCONTINUED | OUTPATIENT
Start: 2022-06-06 | End: 2022-06-13

## 2022-06-06 RX ORDER — CARBIDOPA AND LEVODOPA 25; 100 MG/1; MG/1
1 TABLET, EXTENDED RELEASE ORAL
COMMUNITY

## 2022-06-06 RX ORDER — DONEPEZIL HYDROCHLORIDE 5 MG/1
5 TABLET, FILM COATED ORAL
COMMUNITY

## 2022-06-06 RX ORDER — QUETIAPINE FUMARATE 25 MG/1
12.5 TABLET, FILM COATED ORAL
COMMUNITY
End: 2022-06-14

## 2022-06-06 RX ORDER — SERTRALINE HYDROCHLORIDE 50 MG/1
50 TABLET, FILM COATED ORAL EVERY EVENING
COMMUNITY

## 2022-06-06 RX ORDER — SUCRALFATE 1 G/1
1 TABLET ORAL
Status: DISCONTINUED | OUTPATIENT
Start: 2022-06-06 | End: 2022-06-06

## 2022-06-06 RX ORDER — CARBIDOPA AND LEVODOPA 25; 100 MG/1; MG/1
1 TABLET, EXTENDED RELEASE ORAL 3 TIMES DAILY
Status: DISCONTINUED | OUTPATIENT
Start: 2022-06-06 | End: 2022-06-06

## 2022-06-06 RX ORDER — LEVETIRACETAM 500 MG/1
500 TABLET ORAL 2 TIMES DAILY
Status: DISCONTINUED | OUTPATIENT
Start: 2022-06-06 | End: 2022-06-06

## 2022-06-06 RX ORDER — NALOXONE HYDROCHLORIDE 0.4 MG/ML
0.4 INJECTION, SOLUTION INTRAMUSCULAR; INTRAVENOUS; SUBCUTANEOUS AS NEEDED
Status: DISCONTINUED | OUTPATIENT
Start: 2022-06-06 | End: 2022-06-08

## 2022-06-06 RX ORDER — ACETAMINOPHEN 325 MG/1
650 TABLET ORAL
Status: DISCONTINUED | OUTPATIENT
Start: 2022-06-06 | End: 2022-06-14 | Stop reason: HOSPADM

## 2022-06-06 RX ORDER — CARBIDOPA AND LEVODOPA 25; 100 MG/1; MG/1
1.5 TABLET ORAL
Status: DISCONTINUED | OUTPATIENT
Start: 2022-06-06 | End: 2022-06-14 | Stop reason: HOSPADM

## 2022-06-06 RX ORDER — CARBIDOPA AND LEVODOPA 25; 100 MG/1; MG/1
1.5 TABLET ORAL
Status: DISCONTINUED | OUTPATIENT
Start: 2022-06-06 | End: 2022-06-06

## 2022-06-06 RX ORDER — PANTOPRAZOLE SODIUM 40 MG/1
40 TABLET, DELAYED RELEASE ORAL
Status: DISCONTINUED | OUTPATIENT
Start: 2022-06-06 | End: 2022-06-14 | Stop reason: HOSPADM

## 2022-06-06 RX ORDER — SODIUM CHLORIDE 0.9 % (FLUSH) 0.9 %
5-40 SYRINGE (ML) INJECTION AS NEEDED
Status: DISCONTINUED | OUTPATIENT
Start: 2022-06-06 | End: 2022-06-14 | Stop reason: HOSPADM

## 2022-06-06 RX ORDER — CARBIDOPA AND LEVODOPA 25; 100 MG/1; MG/1
1 TABLET, EXTENDED RELEASE ORAL
Status: DISCONTINUED | OUTPATIENT
Start: 2022-06-07 | End: 2022-06-06

## 2022-06-06 RX ORDER — MIDODRINE HYDROCHLORIDE 2.5 MG/1
5 TABLET ORAL DAILY
COMMUNITY

## 2022-06-06 RX ORDER — QUETIAPINE FUMARATE 25 MG/1
25 TABLET, FILM COATED ORAL ONCE
COMMUNITY

## 2022-06-06 RX ORDER — SODIUM CHLORIDE 0.9 % (FLUSH) 0.9 %
5-40 SYRINGE (ML) INJECTION EVERY 8 HOURS
Status: DISCONTINUED | OUTPATIENT
Start: 2022-06-06 | End: 2022-06-14 | Stop reason: HOSPADM

## 2022-06-06 RX ORDER — SIMVASTATIN 20 MG/1
TABLET, FILM COATED ORAL
COMMUNITY

## 2022-06-06 RX ORDER — SERTRALINE HYDROCHLORIDE 50 MG/1
50 TABLET, FILM COATED ORAL DAILY
Status: DISCONTINUED | OUTPATIENT
Start: 2022-06-07 | End: 2022-06-14 | Stop reason: HOSPADM

## 2022-06-06 RX ORDER — QUETIAPINE FUMARATE 25 MG/1
12.5 TABLET, FILM COATED ORAL
Status: DISCONTINUED | OUTPATIENT
Start: 2022-06-06 | End: 2022-06-14 | Stop reason: HOSPADM

## 2022-06-06 RX ORDER — QUETIAPINE FUMARATE 25 MG/1
25 TABLET, FILM COATED ORAL
Status: DISCONTINUED | OUTPATIENT
Start: 2022-06-06 | End: 2022-06-09

## 2022-06-06 RX ORDER — ATORVASTATIN CALCIUM 10 MG/1
10 TABLET, FILM COATED ORAL
Status: DISCONTINUED | OUTPATIENT
Start: 2022-06-06 | End: 2022-06-14 | Stop reason: HOSPADM

## 2022-06-06 RX ORDER — CARBIDOPA AND LEVODOPA 25; 100 MG/1; MG/1
1 TABLET, EXTENDED RELEASE ORAL
Status: DISCONTINUED | OUTPATIENT
Start: 2022-06-06 | End: 2022-06-06

## 2022-06-06 RX ORDER — DONEPEZIL HYDROCHLORIDE 5 MG/1
5 TABLET, FILM COATED ORAL
Status: DISCONTINUED | OUTPATIENT
Start: 2022-06-06 | End: 2022-06-14 | Stop reason: HOSPADM

## 2022-06-06 RX ORDER — MIDODRINE HYDROCHLORIDE 2.5 MG/1
2.5 TABLET ORAL
Status: DISCONTINUED | OUTPATIENT
Start: 2022-06-06 | End: 2022-06-14 | Stop reason: HOSPADM

## 2022-06-06 RX ORDER — ONDANSETRON 2 MG/ML
4 INJECTION INTRAMUSCULAR; INTRAVENOUS
Status: DISCONTINUED | OUTPATIENT
Start: 2022-06-06 | End: 2022-06-14 | Stop reason: HOSPADM

## 2022-06-06 RX ORDER — ACETAMINOPHEN 325 MG/1
650 TABLET ORAL
Status: COMPLETED | OUTPATIENT
Start: 2022-06-06 | End: 2022-06-06

## 2022-06-06 RX ORDER — CARBIDOPA AND LEVODOPA 25; 100 MG/1; MG/1
1.5 TABLET ORAL
COMMUNITY

## 2022-06-06 RX ORDER — PANTOPRAZOLE SODIUM 20 MG/1
20 TABLET, DELAYED RELEASE ORAL DAILY
COMMUNITY

## 2022-06-06 RX ADMIN — SODIUM CHLORIDE 75 ML/HR: 900 INJECTION, SOLUTION INTRAVENOUS at 10:22

## 2022-06-06 RX ADMIN — QUETIAPINE FUMARATE 12.5 MG: 25 TABLET ORAL at 10:35

## 2022-06-06 RX ADMIN — LEVETIRACETAM 500 MG: 100 INJECTION INTRAVENOUS at 20:51

## 2022-06-06 RX ADMIN — POLYETHYLENE GLYCOL 3350 17 G: 17 POWDER, FOR SOLUTION ORAL at 10:34

## 2022-06-06 RX ADMIN — SODIUM CHLORIDE, PRESERVATIVE FREE 10 ML: 5 INJECTION INTRAVENOUS at 10:26

## 2022-06-06 RX ADMIN — CARBIDOPA AND LEVODOPA 1.5 TABLET: 25; 100 TABLET ORAL at 14:09

## 2022-06-06 RX ADMIN — MORPHINE SULFATE 1 MG: 2 INJECTION, SOLUTION INTRAMUSCULAR; INTRAVENOUS at 16:49

## 2022-06-06 RX ADMIN — ACETAMINOPHEN 650 MG: 325 TABLET ORAL at 07:03

## 2022-06-06 RX ADMIN — SODIUM CHLORIDE, PRESERVATIVE FREE 5 ML: 5 INJECTION INTRAVENOUS at 22:00

## 2022-06-06 RX ADMIN — PANTOPRAZOLE SODIUM 40 MG: 40 TABLET, DELAYED RELEASE ORAL at 10:38

## 2022-06-06 RX ADMIN — LEVETIRACETAM 500 MG: 100 INJECTION INTRAVENOUS at 10:34

## 2022-06-06 RX ADMIN — CARBIDOPA AND LEVODOPA 1 TABLET: 25; 100 TABLET, EXTENDED RELEASE ORAL at 14:09

## 2022-06-06 RX ADMIN — ONDANSETRON HYDROCHLORIDE 4 MG: 2 SOLUTION INTRAMUSCULAR; INTRAVENOUS at 14:07

## 2022-06-06 RX ADMIN — SODIUM CHLORIDE, PRESERVATIVE FREE 10 ML: 5 INJECTION INTRAVENOUS at 15:01

## 2022-06-06 RX ADMIN — QUETIAPINE FUMARATE 25 MG: 25 TABLET ORAL at 22:00

## 2022-06-06 RX ADMIN — MORPHINE SULFATE 1 MG: 2 INJECTION, SOLUTION INTRAMUSCULAR; INTRAVENOUS at 10:38

## 2022-06-06 RX ADMIN — SODIUM CHLORIDE 75 ML/HR: 900 INJECTION, SOLUTION INTRAVENOUS at 23:09

## 2022-06-06 RX ADMIN — MORPHINE SULFATE 1 MG: 2 INJECTION, SOLUTION INTRAMUSCULAR; INTRAVENOUS at 20:51

## 2022-06-06 NOTE — ED PROVIDER NOTES
55-year-old female presents from [facility] after reported ground-level fall. EMS reports that she had a fall on carpeted floor. She did strike her head with no loss of consciousness. She does take a blood thinner. Patient was initially complaining of left hip pain. During transport, she also started complaining of right hip pain as well. She denies any headache, vomiting. No chest pain or shortness of breath. Patient has a history of heart failure and hypertension           Past Medical History:   Diagnosis Date    GERD (gastroesophageal reflux disease)     Heart failure ([address]     Hypertension        Past Surgical History:   Procedure Laterality Date    BREAST SURGERY PROCEDURE UNLISTED      l breast 2 cyst    HX GYN      hysterectomy    HX HEENT      catacacts removed         Family History:   Problem Relation Age of Onset    Stroke Mother     Heart Disease Father        Social History     Socioeconomic History    Marital status:      Spouse name: Not on file    Number of children: Not on file    Years of education: Not on file    Highest education level: Not on file   Occupational History    Not on file   Tobacco Use    Smoking status: Never Smoker    Smokeless tobacco: Not on file   Substance and Sexual Activity    Alcohol use: Yes     Comment: occasional    Drug use: No    Sexual activity: Not on file   Other Topics Concern    Not on file   Social History Narrative    Not on file     Social Determinants of Health     Financial Resource Strain:     Difficulty of Paying Living Expenses: Not on file   Food Insecurity:     Worried About Running Out of Food in the Last Year: Not on file    Saad of Food in the Last Year: Not on file   Transportation Needs:     Lack of Transportation (Medical): Not on file    Lack of Transportation (Non-Medical):  Not on file   Physical Activity:     Days of Exercise per Week: Not on file    Minutes of Exercise per Session: Not on file   Stress:     Feeling of Stress : Not on file   Social Connections:     Frequency of Communication with Friends and Family: Not on file    Frequency of Social Gatherings with Friends and Family: Not on file    Attends Episcopal Services: Not on file    Active Member of Clubs or Organizations: Not on file    Attends Club or Organization Meetings: Not on file    Marital Status: Not on file   Intimate Partner Violence:     Fear of Current or Ex-Partner: Not on file    Emotionally Abused: Not on file    Physically Abused: Not on file    Sexually Abused: Not on file   Housing Stability:     Unable to Pay for Housing in the Last Year: Not on file    Number of [place] in the Last Year: Not on file    Unstable Housing in the Last Year: Not on file         ALLERGIES: Patient has no known allergies. Review of Systems   Unable to perform ROS: Dementia       Vitals:    06/06/22 0642   BP: (!) 189/93   Pulse: 83   Resp: 16   Temp: 98.3 °F (36.8 °C)   SpO2: 99%            Physical Exam  Vitals and nursing note reviewed. Constitutional:       General: She is not in acute distress. Appearance: She is well-developed. HENT:      Head: Normocephalic and atraumatic. Eyes:      General: No scleral icterus. Conjunctiva/sclera: Conjunctivae normal.      Pupils: Pupils are equal, round, and reactive to light. Cardiovascular:      Rate and Rhythm: Normal rate. Heart sounds: No murmur heard. Pulmonary:      Effort: Pulmonary effort is normal. No respiratory distress. Abdominal:      General: There is no distension. Musculoskeletal:         General: Normal range of motion. Cervical back: Normal range of motion and neck supple. Skin:     General: Skin is warm and dry. Findings: No rash. Neurological:      Mental Status: She is alert and oriented to person, place, and time.           MDM  Number of Diagnoses or Management Options     Amount and/or Complexity of Data Reviewed  Clinical lab tests: reviewed  Tests in the radiology section of CPT®: reviewed         Perfect Serve Consult for Admission  7:32 AM    ED Room Number: B/HB  Patient Name and age:  Tosin Alfonso 80 y.o.  female  Working Diagnosis:   1. Closed fracture of right hip, initial encounter ([facility] [address]    2. Fall, initial encounter    3. Closed head injury, initial encounter    4. Subdural hematoma ([facility] [address]        COVID-19 Suspicion:  no  Sepsis present:  no  Reassessment needed: no  Code Status:  Full Code  Readmission: no  Isolation Requirements:  no  Recommended Level of Care:  med/surg  Department: [facility] Adult ED - [phone]  Admitting Provider:     Other:  Nursing home resident. GLF with R hip fracture. Ortho being notified. Total critical care time spent exclusive of procedures:  35 minutes.     Procedures Implemented All Fall Risk Interventions:  [place] to call system. Call bell, personal items and telephone within reach. Instruct patient to call for assistance. Room bathroom lighting operational. Non-slip footwear when patient is off stretcher. Physically safe environment: no spills, clutter or unnecessary equipment. Stretcher in lowest position, wheels locked, appropriate side rails in place. Provide visual cue, wrist band, yellow gown, etc. Monitor gait and stability. Monitor for mental status changes and reorient to person, place, and time. Review medications for side effects contributing to fall risk. Reinforce activity limits and safety measures with patient and family.

## 2022-06-06 NOTE — ED NOTES
Updated home med list with daughter at bedside. Pt reports right ankle pain. No x-ray noted. Notified Dr Raeford Schilder, via perfect serve text, of this information.

## 2022-06-06 NOTE — ED NOTES
Assumed care of patient at this time. Pt placed on cardiac monitor x3. Changed from soiled pad and purewick placed. Call bell is within reach.

## 2022-06-06 NOTE — CONSULTS
ORTHOPEDIC CONSULT NOTE    Subjective:     Date of Consultation:  June 6, 2022  Referring Physician:  Jere Beard MD    Carmenza Goldman is a 80 y.o. female who is being seen for left hip fracture. The pt resides at [facility]. The patient suffered a GLF on a carpeted floor. The patient reports that she was wearing slippers and using her rollator and fell. She states she tripped on her slippers, causing her to fall. She was unable to bear weight following the fall and was brought to [facility] ED for further eval and mgmt. She underwent XR which showed an Right intertrochanteric femur fracture. She reports right hip pain. Denies distal paresthesias. Discussed pt with son and daughter today. Of note, the patient did strike her head during the fall, no reported LOC. She was found to have a Moderately large acute on chronic right frontoparietal subdural hematoma and acute on chronic left frontoparietal subdural hematoma on CT. She takes Plavix. Patient Active Problem List    Diagnosis Date Noted    Hip fracture ([address] 06/06/2022    Orthostatic hypotension 02/28/2019    Parkinson disease ([address] 02/28/2019    Internal carotid artery dissection ([address] 02/24/2019    Cerebrovascular accident (CVA) due to embolism of left carotid artery ([address] 02/23/2019       Family History   Problem Relation Age of Onset    Stroke Mother     Heart Disease Father       Social History     Tobacco Use    Smoking status: Never Smoker    Smokeless tobacco: Not on file   Substance Use Topics    Alcohol use: Yes     Comment: occasional     Past Medical History:   Diagnosis Date    GERD (gastroesophageal reflux disease)     Heart failure ([address]     Hypertension       Past Surgical History:   Procedure Laterality Date    BREAST SURGERY PROCEDURE UNLISTED      l breast 2 cyst    HX GYN      hysterectomy    HX HEENT      catacacts removed      Prior to Admission medications    Medication Sig Start Date End Date Taking? Authorizing Provider   levETIRAcetam (Keppra) 500 mg tablet Take 1 Tab by mouth two (2) times a day. 11/16/20   Christine Arcos MD   atorvastatin (LIPITOR) 40 mg tablet Take 1 Tab by mouth daily. 2/28/19   Eleno ALONSO MD   ferrous sulfate 325 mg (65 mg iron) tablet Take 325 mg by mouth two (2) times a day. Provider, Historical   Dexlansoprazole (DEXILANT) 60 mg CpDM Take 60 mg by mouth daily. Provider, Historical   sucralfate (CARAFATE) 1 gram tablet Take 1 g by mouth three (3) times daily (with meals). Provider, Historical   ondansetron (ZOFRAN ODT) 4 mg disintegrating tablet Take 4 mg by mouth every eight (8) hours as needed for Nausea. Provider, Historical   fluticasone-salmeterol (ADVAIR HFA) 115-21 mcg/actuation inhaler Take 1 Puff by inhalation nightly. Provider, Historical   polyethylene glycol (MIRALAX) 17 gram packet Take 17 g by mouth as needed. Provider, Historical   acetaminophen (TYLENOL ARTHRITIS PAIN) 650 mg CR tablet Take 650 mg by mouth every six (6) hours as needed for Pain.     Provider, Historical     Current Facility-Administered Medications   Medication Dose Route Frequency    sucralfate (CARAFATE) tablet 1 g  1 g Oral TID WITH MEALS    pantoprazole (PROTONIX) tablet 40 mg  40 mg Oral ACB    0.9% sodium chloride infusion  75 mL/hr IntraVENous CONTINUOUS    sodium chloride (NS) flush 5-40 mL  5-40 mL IntraVENous Q8H    sodium chloride (NS) flush 5-40 mL  5-40 mL IntraVENous PRN    naloxone (NARCAN) injection 0.4 mg  0.4 mg IntraVENous PRN    acetaminophen (TYLENOL) tablet 650 mg  650 mg Oral Q6H PRN    morphine injection 1 mg  1 mg IntraVENous Q4H PRN    ondansetron (ZOFRAN) injection 4 mg  4 mg IntraVENous Q6H PRN    carbidopa-levodopa (SINEMET)  mg per tablet 1 Tablet  1 Tablet Oral BID    polyethylene glycol (MIRALAX) packet 17 g  17 g Oral DAILY    QUEtiapine (SEROquel) tablet 25 mg  25 mg Oral QHS    QUEtiapine (SEROquel) tablet 12.5 mg 12.5 mg Oral DAILY AFTER BREAKFAST    midodrine (PROAMATINE) tablet 2.5 mg  2.5 mg Oral DAILY AFTER BREAKFAST    carbidopa-levodopa ER (SINEMET CR)  mg per tablet 1 Tablet  1 Tablet Oral DAILY AFTER BREAKFAST    donepeziL (ARICEPT) tablet 5 mg  5 mg Oral QHS    levETIRAcetam (KEPPRA) injection 500 mg  500 mg IntraVENous Q12H     Current Outpatient Medications   Medication Sig    levETIRAcetam (Keppra) 500 mg tablet Take 1 Tab by mouth two (2) times a day.  atorvastatin (LIPITOR) 40 mg tablet Take 1 Tab by mouth daily.  ferrous sulfate 325 mg (65 mg iron) tablet Take 325 mg by mouth two (2) times a day.  Dexlansoprazole (DEXILANT) 60 mg CpDM Take 60 mg by mouth daily.  sucralfate (CARAFATE) 1 gram tablet Take 1 g by mouth three (3) times daily (with meals).  ondansetron (ZOFRAN ODT) 4 mg disintegrating tablet Take 4 mg by mouth every eight (8) hours as needed for Nausea.  fluticasone-salmeterol (ADVAIR HFA) 115-21 mcg/actuation inhaler Take 1 Puff by inhalation nightly.  polyethylene glycol (MIRALAX) 17 gram packet Take 17 g by mouth as needed.  acetaminophen (TYLENOL ARTHRITIS PAIN) 650 mg CR tablet Take 650 mg by mouth every six (6) hours as needed for Pain. No Known Allergies     Review of Systems:  A comprehensive review of systems was negative except for that written in the HPI. Objective:     Patient Vitals for the past 8 hrs:   BP Temp Pulse Resp SpO2   22 0642 (!) 189/93 98.3 °F (36.8 °C) 83 16 99 %     Temp (24hrs), Av.3 °F (36.8 °C), Min:98.3 °F (36.8 °C), Max:98.3 °F (36.8 °C)        ORTHO EXAM:  alert, cooperative, no distress, appears stated age  RLE MSK: Right hip without abrasions. Bony tenderness to the anterior and lateral hip. No obvious deformity at the hip noted. Right leg is shortened with external rotation. Sensation to light touch intact to the medial/lat/posterior aspects of the foot.  Compartments of the thigh and calf are soft and compressible, without pain. Able to wiggle toes. Passive flexion of great toe without pain. Minimal ankle dorsiflexion and plantar flexion due to pain. Pain with minimal passive internal/external rotation of the hip. 2+ DP pulses. Cap refill brisk. IMAGING REVIEW:  EXAM: XR HIPS BI W OR WO AP PELV     INDICATION: fall, pain.     COMPARISON: None.     FINDINGS: 3 views bilateral hips. AP view of the pelvis and frogleg lateral  views of both hips demonstrate a mildly displaced intertrochanteric fracture of  the right hip. There is no dislocation. Mild bilateral hip osteoarthritis is  present. There are degenerative changes in the lumbar spine.     IMPRESSION  Mildly displaced intertrochanteric right hip fracture    Labs:   Recent Results (from the past 24 hour(s))   SAMPLES BEING HELD    Collection Time: 06/06/22  6:52 AM   Result Value Ref Range    SAMPLES BEING HELD 1BLU,1RED     COMMENT        Add-on orders for these samples will be processed based on acceptable specimen integrity and analyte stability, which may vary by analyte. CBC WITH AUTOMATED DIFF    Collection Time: 06/06/22  6:52 AM   Result Value Ref Range    WBC 4.2 3.6 - 11.0 K/uL    RBC 4.44 3.80 - 5.20 M/uL    HGB 12.4 11.5 - 16.0 g/dL    HCT 39.6 35.0 - 47.0 %    MCV 89.2 80.0 - 99.0 FL    MCH 27.9 26.0 - 34.0 PG    MCHC 31.3 30.0 - 36.5 g/dL    RDW 15.7 (H) 11.5 - 14.5 %    PLATELET 128 799 - 230 K/uL    MPV 9.4 8.9 - 12.9 FL    NRBC 0.0 0  WBC    ABSOLUTE NRBC 0.00 0.00 - 0.01 K/uL    NEUTROPHILS 32 32 - 75 %    LYMPHOCYTES 55 (H) 12 - 49 %    MONOCYTES 10 5 - 13 %    EOSINOPHILS 1 0 - 7 %    BASOPHILS 1 0 - 1 %    IMMATURE GRANULOCYTES 1 (H) 0.0 - 0.5 %    ABS. NEUTROPHILS 1.4 (L) 1.8 - 8.0 K/UL    ABS. LYMPHOCYTES 2.3 0.8 - 3.5 K/UL    ABS. MONOCYTES 0.4 0.0 - 1.0 K/UL    ABS. EOSINOPHILS 0.1 0.0 - 0.4 K/UL    ABS. BASOPHILS 0.0 0.0 - 0.1 K/UL    ABS. IMM.  GRANS. 0.0 0.00 - 0.04 K/UL    DF AUTOMATED     METABOLIC PANEL, BASIC Collection Time: 06/06/22  6:52 AM   Result Value Ref Range    Sodium 142 136 - 145 mmol/L    Potassium 4.4 3.5 - 5.1 mmol/L    Chloride 106 97 - 108 mmol/L    CO2 31 21 - 32 mmol/L    Anion gap 5 5 - 15 mmol/L    Glucose 115 (H) 65 - 100 mg/dL    BUN 16 6 - 20 MG/DL    Creatinine 0.88 0.55 - 1.02 MG/DL    BUN/Creatinine ratio 18 12 - 20      GFR est AA >60 >60 ml/min/1.73m2    GFR est non-AA >60 >60 ml/min/1.73m2    Calcium 9.6 8.5 - 10.1 MG/DL         Impression:     Patient Active Problem List    Diagnosis Date Noted    Hip fracture ([facility] 06/06/2022    Orthostatic hypotension 02/28/2019    Parkinson disease ([facility] 02/28/2019    Internal carotid artery dissection ([facility] 02/24/2019    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 Dr. Lincoln Sherman  - Plan to take the pt to the OR for a right trochanteric femur nail with Dr. Lincoln Sherman. - Pt will need to be medically optimized prior to ortho surgery.  - Neurosurg recs for no surgery today and will obtain CT head tomorrow am. Once resulted will determine if pt can have surgery tomorrow. Neurosurg also recommended to limit anticoag following hip surgery.   - NPO at midnight  - Will await neurosurg recs tomorrow morning to plan for surgery. - Consented and posted for tomorrow. Her children are her POAs.       [place], [address]

## 2022-06-06 NOTE — PROGRESS NOTES
Bedside shift change report given to Clarence/RN (oncoming nurse) by Valarie/TEON (offgoing nurse). Report included the following information SBAR, Kardex, ED Summary, Procedure Summary, Intake/Output, MAR, Accordion, Recent Results, Alarm Parameters , Quality Measures and Dual Neuro Assessment.

## 2022-06-06 NOTE — CONSULTS
Brief Neurology Consult Note:    Rashmi Priest case was briefly reviewed in chart, appears to have suffered acute trauma with intracranial hemorrhage (subdural). Neurosurgery has already consulted, plat yajaira boland within normal limits. Outside of secondary consequences (seizure etc) of traumatic bleeds there is no role for Neurology at this institution. Consult deferred.

## 2022-06-06 NOTE — PROGRESS NOTES
Reason for Admission:  Hip Fracture and Subdural Hemorrhage                     RUR Score:    7%              Plan for utilizing home health:          PCP: First and Last name:  Laila Marcos MD     Name of Practice:    Are you a current patient: Yes/No:    Approximate date of last visit:    Can you participate in a virtual visit with your PCP:                     Current Advanced Directive/Advance Care Plan: Full Code  No AMD on file. Healthcare Decision Maker:   Click here to complete [address] including selection of the Healthcare Decision Maker Relationship (ie \"Primary\")             Primary Decision Maker: Kd Red - [phone]    Secondary Decision Maker: Leonora Mobley - [phone]                  Transition of Care Plan:  SSED/CM consult received and appreciated. EMR reviewed. Patient presents from [facility] s/p fall. Ms. Paulie Li has resided at facility since 12/15/21. Call placed to facility [phone] spoke w/ Nurse Claudy Griffith and provided updates. CM noted insurance information copy not clear w/ scanned process. Requested [facility] information be faxed. Met w/patient and introduced to role of CM. Patient is alert and oriented. Informed would communicate w/ Jennifer Calloway and Rivas Bourne - patient informed this writer her daughter is a Physician. Call placed to Jennifersamuel Escobarniesha Lemus(primary AOCNIOF)[phone] introduced to role of CM. Additional history obtained. Son is a [place] and handles majority of affairs. Children have attempted for patient to completed AMD however she has elected not to complete. Rivas Bourne is a Physician both are en route to [facility] [place] ETA [id number].  has a [employer] daily 9A-6P will meet at hospital.     CM discussed transitional care planning process previous provider [facility] (2019) Son would like to consider [facility] if medical criteria meet.  CM discussed SNF vs. IPR and son acknowledges the need for medical team to determine LOC needs when appropriate. Case Management will continue to follow for transitions of care needs. Updates provided to [facility]. Care Management Interventions  PCP Verified by CM:  Yes  Palliative Care Criteria Met (RRAT>21 & CHF Dx)?: No  Transition of Care Consult (CM Consult): Discharge Planning  MyChart Signup: No  Discharge Durable Medical Equipment: No  Health Maintenance Reviewed: Yes  Physical Therapy Consult: No  Occupational Therapy Consult: No  Speech Therapy Consult: No  Support Systems: Child(tootie),Assisted Living,Caregiver/Home Care Staff  [employer] Resource Information Provided?: No  Discharge Location  Patient Expects to be Discharged to[de-identified]  (TBD)

## 2022-06-06 NOTE — ED TRIAGE NOTES
Triage: Pt arrives via EMS with CC of fall. Pt reports she falls frequently due to her Parkinson's. Pt reports bilateral hip pain. Hit her head but denies headache.

## 2022-06-06 NOTE — ED NOTES
TRANSFER - OUT REPORT:    Verbal report given to BARRY Sheppard(name) on Mary Rojas  being transferred to 6S(unit) for routine progression of care       Report consisted of patients Situation, Background, Assessment and   Recommendations(SBAR). Information from the following report(s) SBAR, ED Summary, Intake/Output, MAR, Recent Results and Cardiac Rhythm NSR was reviewed with the receiving nurse. Lines:   Peripheral IV 06/06/22 Right Forearm (Active)   Site Assessment Clean, dry, & intact 06/06/22 0956   Phlebitis Assessment 0 06/06/22 0956   Infiltration Assessment 0 06/06/22 0956   Dressing Status Clean, dry, & intact 06/06/22 0956   Dressing Type Transparent;Tape 06/06/22 0956   Hub Color/Line Status Pink 06/06/22 0956   Action Taken Blood drawn 06/06/22 9332        Opportunity for questions and clarification was provided.       Patient transported with:  Transport

## 2022-06-06 NOTE — PROGRESS NOTES
No consult called in. No conversation with er or admitting md.  pts name appeared on my [facility] list.  Will initiate formal consult.

## 2022-06-06 NOTE — CONSULTS
No need for urgent drainage of sdh at this time. Pt on plavix. Mostly chronic sdh with smaller acute component. Would not recommend hip surgery today. Admit to 6s and repeat head ct tomorrow am.  If stable, can proceed with hip surgery.   Will need to limit anti-coagulants after hip surgery as possible

## 2022-06-06 NOTE — H&P
History & Physical    Primary Care Provider: Tracy Carver MD  Source of Information: Patient and daughter over the phone    Please note that this dictation was completed with [employer], the computer voice recognition software. Quite often unanticipated grammatical, syntax, homophones, and other interpretive errors are inadvertently transcribed by the computer software. Please disregard these errors. Please excuse any errors that have escaped final proofreading. History of Presenting Illness:   Jamari Goldman is a 80 y.o. female who presents with ground level fall. Patient came from [address] [place] [address] home after reported ground-level fall EMS said that she had fall on a carpeted floor did not strike her head with no loss of consciousness she takes Plavix daily as per daughter for previous strokes. She was initially complaining of left-sided hip pain during transport she also complains of right hip pain as well denies any headache vomiting loss of consciousness patient was alert oriented and could give history as well in the ED. x-ray showing right hip fracture and CT scan of the head showing subdural hemorrhage    The patient denies any fever, chills, chest pain, cough, congestion, recent illness, palpitations, or dysuria. Review of Systems:  Pertinent items are noted in the History of Present Illness. Past Medical History:   Diagnosis Date    GERD (gastroesophageal reflux disease)     Heart failure ([address]     Hypertension       Past Surgical History:   Procedure Laterality Date    BREAST SURGERY PROCEDURE UNLISTED      l breast 2 cyst    HX GYN      hysterectomy    HX HEENT      catacacts removed     Prior to Admission medications    Medication Sig Start Date End Date Taking? Authorizing Provider   levETIRAcetam (Keppra) 500 mg tablet Take 1 Tab by mouth two (2) times a day.  11/16/20   Nic Colin MD   atorvastatin (LIPITOR) 40 mg tablet Take 1 Tab by mouth daily. 2/28/19   Jennifer ALONSO MD   ferrous sulfate 325 mg (65 mg iron) tablet Take 325 mg by mouth two (2) times a day. Provider, Historical   Dexlansoprazole (DEXILANT) 60 mg CpDM Take 60 mg by mouth daily. Provider, Historical   sucralfate (CARAFATE) 1 gram tablet Take 1 g by mouth three (3) times daily (with meals). Provider, Historical   ondansetron (ZOFRAN ODT) 4 mg disintegrating tablet Take 4 mg by mouth every eight (8) hours as needed for Nausea. Provider, Historical   fluticasone-salmeterol (ADVAIR HFA) 115-21 mcg/actuation inhaler Take 1 Puff by inhalation nightly. Provider, Historical   polyethylene glycol (MIRALAX) 17 gram packet Take 17 g by mouth as needed. Provider, Historical   acetaminophen (TYLENOL ARTHRITIS PAIN) 650 mg CR tablet Take 650 mg by mouth every six (6) hours as needed for Pain. Provider, Historical     No Known Allergies   Family History   Problem Relation Age of Onset    Stroke Mother     Heart Disease Father         SOCIAL HISTORY:  Patient resides:  Independently    Assisted Living    SNF x   With family care       Smoking history:   None x   Former    Chronic      Alcohol history:   None x   Social    Chronic      Ambulates:   Independently x   w/cane    w/walker    w/wc    CODE STATUS:  DNR    Full x   Other      Objective:     Physical Exam:     Visit Vitals  BP (!) 189/93 (BP 1 Location: Right arm, BP Patient Position: At rest)   Pulse 83   Temp 98.3 °F (36.8 °C)   Resp 16   SpO2 99%      O2 Device: None    General:  Alert, cooperative, alert and oriented x3   Head:  Normocephalic, without obvious abnormality, atraumatic. Eyes:  Conjunctivae/corneas clear. PERRL, EOMs intact. Nose: Nares normal. Septum midline. Mucosa normal.    Throat: Lips, mucosa, and tongue normal. Teeth and gums normal.   Neck: Supple, symmetrical, trachea midline,  no carotid bruit and no JVD. Lungs:   Clear to auscultation bilaterally. Heart:  Regular rate and rhythm, S1, S2 normal, no murmur, click, rub or gallop. Abdomen:   Soft, non-tender. Bowel sounds normal. No masses,  No organomegaly. Extremities:  She could move the extremities but painful on the right   Pulses: 2+ and symmetric all extremities. Painful on the right hip on movement   Skin: Skin color, texture, turgor normal. No rashes or lesions   Neurologic: CNII-XII intact. Alert and oriented         EKG:  normal sinus rhythm. Data Review:     Recent Days:  Recent Labs     06/06/22  0652   WBC 4.2   HGB 12.4   HCT 39.6        Recent Labs     06/06/22  0652      K 4.4      CO2 31   *   BUN 16   CREA 0.88   CA 9.6     No results for input(s): PH, PCO2, PO2, HCO3, FIO2 in the last 72 hours. 24 Hour Results:  Recent Results (from the past 24 hour(s))   SAMPLES BEING HELD    Collection Time: 06/06/22  6:52 AM   Result Value Ref Range    SAMPLES BEING HELD 1BLU,1RED     COMMENT        Add-on orders for these samples will be processed based on acceptable specimen integrity and analyte stability, which may vary by analyte. CBC WITH AUTOMATED DIFF    Collection Time: 06/06/22  6:52 AM   Result Value Ref Range    WBC 4.2 3.6 - 11.0 K/uL    RBC 4.44 3.80 - 5.20 M/uL    HGB 12.4 11.5 - 16.0 g/dL    HCT 39.6 35.0 - 47.0 %    MCV 89.2 80.0 - 99.0 FL    MCH 27.9 26.0 - 34.0 PG    MCHC 31.3 30.0 - 36.5 g/dL    RDW 15.7 (H) 11.5 - 14.5 %    PLATELET 407 387 - 405 K/uL    MPV 9.4 8.9 - 12.9 FL    NRBC 0.0 0  WBC    ABSOLUTE NRBC 0.00 0.00 - 0.01 K/uL    NEUTROPHILS 32 32 - 75 %    LYMPHOCYTES 55 (H) 12 - 49 %    MONOCYTES 10 5 - 13 %    EOSINOPHILS 1 0 - 7 %    BASOPHILS 1 0 - 1 %    IMMATURE GRANULOCYTES 1 (H) 0.0 - 0.5 %    ABS. NEUTROPHILS 1.4 (L) 1.8 - 8.0 K/UL    ABS. LYMPHOCYTES 2.3 0.8 - 3.5 K/UL    ABS. MONOCYTES 0.4 0.0 - 1.0 K/UL    ABS. EOSINOPHILS 0.1 0.0 - 0.4 K/UL    ABS. BASOPHILS 0.0 0.0 - 0.1 K/UL    ABS. IMM.  GRANS. 0.0 0.00 - 0.04 K/UL DF AUTOMATED     METABOLIC PANEL, BASIC    Collection Time: 06/06/22  6:52 AM   Result Value Ref Range    Sodium 142 136 - 145 mmol/L    Potassium 4.4 3.5 - 5.1 mmol/L    Chloride 106 97 - 108 mmol/L    CO2 31 21 - 32 mmol/L    Anion gap 5 5 - 15 mmol/L    Glucose 115 (H) 65 - 100 mg/dL    BUN 16 6 - 20 MG/DL    Creatinine 0.88 0.55 - 1.02 MG/DL    BUN/Creatinine ratio 18 12 - 20      GFR est AA >60 >60 ml/min/1.73m2    GFR est non-AA >60 >60 ml/min/1.73m2    Calcium 9.6 8.5 - 10.1 MG/DL         Imaging:     CT HEAD WO CONT    Result Date: 6/6/2022  Moderately large acute on chronic right frontoparietal subdural hematoma. Small acute on chronic left frontoparietal subdural hematoma. No significant mass effect. The findings were called to Dr. Arik Schneider on 6/6/2022 at 7:46 AM by Dr. Tej Beyer. [phone]     XR HIPS BI W OR WO AP PELV    Result Date: 6/6/2022  Mildly displaced intertrochanteric right hip fracture      Admit to inpatient status      Patient was explained about the risk of admission including and not a complete list including risk of falls,fractures,blood clots,allergic reactions,infections. Patient/family also understands and agrees to the treatment plan including medications and side effect profiles and also understand the risk with radiation while undergoing imaging studies. The patient and the family/friends (after permission given by the patient to discuss) understand this and agree with the admission plan.         Assessment:     Principal Problem:    Hip fracture ([address] (6/6/2022)           Plan:     Fall with mildly displaced intertrochanteric hip fracture  -- Pain control with IV and oral pain medication  -- Orthopedic consult [facility]  -- May require surgical fixation    Subdural hemorrhage  -- Moderately large acute on chronic patient is on Plavix holding  -- Neurosurgery consult  -- Niccoli patient is a stable alert oriented x3  -- Admit to telemetry neuro check every 4 hours    History of parkinsonism  -- Discussed with daughter she takes Sinemet with a complex dosing  -- She also was on midodrine for postural hypotension    Memory issue  -- Patient is alert oriented x3 she takes Aricept and Seroquel for sundowning  -- Continue for now    DVT prophylaxis SCD  GI prophylaxis patient is on PPI and Carafate at home  Further management as per clinical course    Called daughter updated patient's clinical status  Discussed CODE STATUS patient does not have any prior goals of care discussion  Remains full code       Signed By: Shawna Sanchez MD     June 6, 2022

## 2022-06-06 NOTE — CONSULTS
[address]    Name:  Zahra Norris  MR#:  [id number]  :  1935  ACCOUNT #:  [de-identified]  DATE OF SERVICE:  2022      CONSULTING PHYSICIAN:  Destini Franco MD    REASON FOR CONSULTATION:  Right subdural hematoma. HISTORY OF PRESENT ILLNESS:  This is an 80-year-old female with Parkinson's and some dementia. She came from [facility] after a ground-level fall. She did not strike her head. No loss of consciousness. She takes Plavix. She broke her left-sided hip and had a clear hip fracture. They scanned her head which shows mostly chronic right subdural hematoma with a small acute component. There is minimal mass effect. The patient has significant cerebral atrophy. According to the daughter, she had a CT scan after another fall in 2021, which did not show any subdural.  She is on Plavix. PAST MEDICAL HISTORY:  1. Parkinson's dementia. 2.  Hypertension. 3.  Heart failure. 4.  Gastroesophageal reflux. 5.  Stroke. PAST SURGERIES:  1. Breast surgery. 2.  Cataract surgery. MEDICATIONS ON ADMISSION:  She is on:  1. Keppra. 2.  Lipitor. 3.  Plavix. 4.  Advair. 5.  Dexilant. SOCIAL HISTORY:  She lives in [place]. Her daughter is the primary care doctor. NEUROLOGIC EXAMINATION:  The patient is recently medicated with morphine for her hip pain. She is somewhat drowsy according to NP who recently examined her. She is awake, alert, somewhat confused but nonfocal.  Unable to test her left leg which is everted to the outside. IMAGING:  As above. IMPRESSION:  The patient has a subdural that is mostly chronic with a small acute component. She did not strike her head. She had no loss of consciousness and no neurologic change. Her primary problem is significant hip fracture. This is not an insignificant subdural hematoma; there is acute component. I talked to her daughter and documented in the chart.   From my standpoint, I would check a P2Y12 to evaluate her Plavix level. I would hold off on fixing her hip today and get her admitted for neuro observation and get a followup CT scan in the morning. If it is stable, I think it is reasonable to proceed with hip surgery.       MD JULIA [email or web address]  D:  06/06/2022 14:56  T:  06/06/2022 16:39  JOB #:  [id number]  CC:  Eduardo Mojica MD

## 2022-06-06 NOTE — ACP (ADVANCE CARE PLANNING)
Advance Care Planning (ACP) Physician/NP/PA Conversation      Date of Conversation: 6/6/2022  Conducted with: Patient with Decision Making Capacity    Healthcare Decision Maker:     Primary Decision Maker: Wellington Byrd - Son - [phone]    Secondary Decision Maker: Armen Galaviz - Child - [phone]  Click here to complete [address] including selection of the Healthcare Decision Maker Relationship (ie \"Primary\")      Today we documented Decision Maker(s) consistent with Legal Next of Kin hierarchy. Care Preferences:    Hospitalization: \"If your health worsens and it becomes clear that your chance of recovery is unlikely, what would be your preference regarding hospitalization? \"  The patient would prefer hospitalization. Ventilation: \"If you were unable to breathe on your own and your chance of recovery was unlikely, what would be your preference about the use of a ventilator (breathing machine) if it was available to you? \"   The patient is unsure. Resuscitation: \"In the event your heart stopped as a result of an underlying serious health condition, would you want attempts to be made to restart your heart, or would you prefer a natural death? \"   Yes, attempt to resuscitate.     Additional topics discussed: treatment goals and benefit/burden of treatment options    Conversation Outcomes / Follow-Up Plan:   ACP complete - no further action today  Reviewed DNR/DNI and patient elects Full Code (Attempt Resuscitation)     Length of Voluntary ACP Conversation in minutes:  16 minutes    Roman Prince MD

## 2022-06-07 ENCOUNTER — ANESTHESIA (OUTPATIENT)
Dept: SURGERY | Age: 87
DRG: 956 | End: 2022-06-07
Payer: MEDICARE

## 2022-06-07 ENCOUNTER — APPOINTMENT (OUTPATIENT)
Dept: GENERAL RADIOLOGY | Age: 87
DRG: 956 | End: 2022-06-07
Attending: HOSPITALIST
Payer: MEDICARE

## 2022-06-07 ENCOUNTER — APPOINTMENT (OUTPATIENT)
Dept: CT IMAGING | Age: 87
DRG: 956 | End: 2022-06-07
Attending: NURSE PRACTITIONER
Payer: MEDICARE

## 2022-06-07 ENCOUNTER — ANESTHESIA EVENT (OUTPATIENT)
Dept: SURGERY | Age: 87
DRG: 956 | End: 2022-06-07
Payer: MEDICARE

## 2022-06-07 LAB
GLUCOSE BLD STRIP.AUTO-MCNC: 110 MG/DL (ref 65–117)
SERVICE CMNT-IMP: NORMAL

## 2022-06-07 PROCEDURE — 74011000250 HC RX REV CODE- 250: Performed by: ORTHOPAEDIC SURGERY

## 2022-06-07 PROCEDURE — 82962 GLUCOSE BLOOD TEST: CPT

## 2022-06-07 PROCEDURE — C1713 ANCHOR/SCREW BN/BN,TIS/BN: HCPCS | Performed by: ORTHOPAEDIC SURGERY

## 2022-06-07 PROCEDURE — 74011250637 HC RX REV CODE- 250/637: Performed by: HOSPITALIST

## 2022-06-07 PROCEDURE — 73501 X-RAY EXAM HIP UNI 1 VIEW: CPT

## 2022-06-07 PROCEDURE — 77030002933 HC SUT MCRYL J&J -A: Performed by: ORTHOPAEDIC SURGERY

## 2022-06-07 PROCEDURE — 77030026438 HC STYL ET INTUB CARD -A: Performed by: ANESTHESIOLOGY

## 2022-06-07 PROCEDURE — 76210000016 HC OR PH I REC 1 TO 1.5 HR: Performed by: ORTHOPAEDIC SURGERY

## 2022-06-07 PROCEDURE — 76010000149 HC OR TIME 1 TO 1.5 HR: Performed by: ORTHOPAEDIC SURGERY

## 2022-06-07 PROCEDURE — 2709999900 HC NON-CHARGEABLE SUPPLY: Performed by: ORTHOPAEDIC SURGERY

## 2022-06-07 PROCEDURE — 74011250636 HC RX REV CODE- 250/636: Performed by: NURSE ANESTHETIST, CERTIFIED REGISTERED

## 2022-06-07 PROCEDURE — 77030008684 HC TU ET CUF COVD -B: Performed by: ANESTHESIOLOGY

## 2022-06-07 PROCEDURE — 74011250637 HC RX REV CODE- 250/637: Performed by: ORTHOPAEDIC SURGERY

## 2022-06-07 PROCEDURE — 74011250636 HC RX REV CODE- 250/636: Performed by: ANESTHESIOLOGY

## 2022-06-07 PROCEDURE — 70450 CT HEAD/BRAIN W/O DYE: CPT

## 2022-06-07 PROCEDURE — 74011250636 HC RX REV CODE- 250/636: Performed by: ORTHOPAEDIC SURGERY

## 2022-06-07 PROCEDURE — 77030031139 HC SUT VCRL2 J&J -A: Performed by: ORTHOPAEDIC SURGERY

## 2022-06-07 PROCEDURE — 74011000250 HC RX REV CODE- 250: Performed by: HOSPITALIST

## 2022-06-07 PROCEDURE — C1769 GUIDE WIRE: HCPCS | Performed by: ORTHOPAEDIC SURGERY

## 2022-06-07 PROCEDURE — 74011250636 HC RX REV CODE- 250/636: Performed by: HOSPITALIST

## 2022-06-07 PROCEDURE — 76000 FLUOROSCOPY <1 HR PHYS/QHP: CPT

## 2022-06-07 PROCEDURE — 77030010507 HC ADH SKN DERMBND J&J -B: Performed by: ORTHOPAEDIC SURGERY

## 2022-06-07 PROCEDURE — 77030014405 HC GD ROD RMR SYNT -C: Performed by: ORTHOPAEDIC SURGERY

## 2022-06-07 PROCEDURE — 77030018673: Performed by: ORTHOPAEDIC SURGERY

## 2022-06-07 PROCEDURE — 77030016474 HC BIT DRL QC3 SYNT -C: Performed by: ORTHOPAEDIC SURGERY

## 2022-06-07 PROCEDURE — 65270000046 HC RM TELEMETRY

## 2022-06-07 PROCEDURE — 76060000033 HC ANESTHESIA 1 TO 1.5 HR: Performed by: ORTHOPAEDIC SURGERY

## 2022-06-07 PROCEDURE — 74011000250 HC RX REV CODE- 250: Performed by: NURSE ANESTHETIST, CERTIFIED REGISTERED

## 2022-06-07 PROCEDURE — 0QS636Z REPOSITION RIGHT UPPER FEMUR WITH INTRAMEDULLARY INTERNAL FIXATION DEVICE, PERCUTANEOUS APPROACH: ICD-10-PCS | Performed by: ORTHOPAEDIC SURGERY

## 2022-06-07 PROCEDURE — 74011250636 HC RX REV CODE- 250/636

## 2022-06-07 DEVICE — IMPLANTABLE DEVICE: Type: IMPLANTABLE DEVICE | Site: FEMUR | Status: FUNCTIONAL

## 2022-06-07 DEVICE — NAIL IM L235MM DIA11MM 125DEG SHT GRN R PROX FEM TI: Type: IMPLANTABLE DEVICE | Site: FEMUR | Status: FUNCTIONAL

## 2022-06-07 DEVICE — SCREW BNE L36MM DIA5MM NONSTERILE TIB LT GRN TI ST CANN LOK: Type: IMPLANTABLE DEVICE | Status: FUNCTIONAL

## 2022-06-07 RX ORDER — SODIUM CHLORIDE, SODIUM LACTATE, POTASSIUM CHLORIDE, CALCIUM CHLORIDE 600; 310; 30; 20 MG/100ML; MG/100ML; MG/100ML; MG/100ML
INJECTION, SOLUTION INTRAVENOUS
Status: DISCONTINUED | OUTPATIENT
Start: 2022-06-07 | End: 2022-06-07 | Stop reason: HOSPADM

## 2022-06-07 RX ORDER — SODIUM CHLORIDE 0.9 % (FLUSH) 0.9 %
5-40 SYRINGE (ML) INJECTION AS NEEDED
Status: DISCONTINUED | OUTPATIENT
Start: 2022-06-07 | End: 2022-06-07 | Stop reason: HOSPADM

## 2022-06-07 RX ORDER — SODIUM CHLORIDE 9 MG/ML
125 INJECTION, SOLUTION INTRAVENOUS CONTINUOUS
Status: DISCONTINUED | OUTPATIENT
Start: 2022-06-07 | End: 2022-06-08

## 2022-06-07 RX ORDER — SODIUM CHLORIDE 0.9 % (FLUSH) 0.9 %
5-40 SYRINGE (ML) INJECTION AS NEEDED
Status: DISCONTINUED | OUTPATIENT
Start: 2022-06-07 | End: 2022-06-14 | Stop reason: HOSPADM

## 2022-06-07 RX ORDER — DIPHENHYDRAMINE HYDROCHLORIDE 50 MG/ML
12.5 INJECTION, SOLUTION INTRAMUSCULAR; INTRAVENOUS AS NEEDED
Status: DISCONTINUED | OUTPATIENT
Start: 2022-06-07 | End: 2022-06-07 | Stop reason: HOSPADM

## 2022-06-07 RX ORDER — POLYETHYLENE GLYCOL 3350 17 G/17G
17 POWDER, FOR SOLUTION ORAL DAILY
Status: DISCONTINUED | OUTPATIENT
Start: 2022-06-08 | End: 2022-06-08

## 2022-06-07 RX ORDER — SODIUM CHLORIDE, SODIUM LACTATE, POTASSIUM CHLORIDE, CALCIUM CHLORIDE 600; 310; 30; 20 MG/100ML; MG/100ML; MG/100ML; MG/100ML
75 INJECTION, SOLUTION INTRAVENOUS CONTINUOUS
Status: DISCONTINUED | OUTPATIENT
Start: 2022-06-07 | End: 2022-06-07 | Stop reason: HOSPADM

## 2022-06-07 RX ORDER — LIDOCAINE HYDROCHLORIDE 10 MG/ML
0.1 INJECTION, SOLUTION EPIDURAL; INFILTRATION; INTRACAUDAL; PERINEURAL AS NEEDED
Status: DISCONTINUED | OUTPATIENT
Start: 2022-06-07 | End: 2022-06-07 | Stop reason: HOSPADM

## 2022-06-07 RX ORDER — ACETAMINOPHEN 325 MG/1
650 TABLET ORAL EVERY 6 HOURS
Status: DISCONTINUED | OUTPATIENT
Start: 2022-06-07 | End: 2022-06-08

## 2022-06-07 RX ORDER — AMOXICILLIN 250 MG
1 CAPSULE ORAL 2 TIMES DAILY
Status: DISCONTINUED | OUTPATIENT
Start: 2022-06-07 | End: 2022-06-14 | Stop reason: HOSPADM

## 2022-06-07 RX ORDER — HYDRALAZINE HYDROCHLORIDE 20 MG/ML
INJECTION INTRAMUSCULAR; INTRAVENOUS
Status: COMPLETED
Start: 2022-06-07 | End: 2022-06-07

## 2022-06-07 RX ORDER — SODIUM CHLORIDE 0.9 % (FLUSH) 0.9 %
5-40 SYRINGE (ML) INJECTION EVERY 8 HOURS
Status: DISCONTINUED | OUTPATIENT
Start: 2022-06-07 | End: 2022-06-07 | Stop reason: HOSPADM

## 2022-06-07 RX ORDER — SODIUM CHLORIDE 0.9 % (FLUSH) 0.9 %
5-40 SYRINGE (ML) INJECTION EVERY 8 HOURS
Status: DISCONTINUED | OUTPATIENT
Start: 2022-06-07 | End: 2022-06-14 | Stop reason: HOSPADM

## 2022-06-07 RX ORDER — ROCURONIUM BROMIDE 10 MG/ML
INJECTION, SOLUTION INTRAVENOUS AS NEEDED
Status: DISCONTINUED | OUTPATIENT
Start: 2022-06-07 | End: 2022-06-07 | Stop reason: HOSPADM

## 2022-06-07 RX ORDER — PROPOFOL 10 MG/ML
INJECTION, EMULSION INTRAVENOUS AS NEEDED
Status: DISCONTINUED | OUTPATIENT
Start: 2022-06-07 | End: 2022-06-07 | Stop reason: HOSPADM

## 2022-06-07 RX ORDER — HYDROMORPHONE HYDROCHLORIDE 1 MG/ML
0.2 INJECTION, SOLUTION INTRAMUSCULAR; INTRAVENOUS; SUBCUTANEOUS
Status: DISCONTINUED | OUTPATIENT
Start: 2022-06-07 | End: 2022-06-07 | Stop reason: HOSPADM

## 2022-06-07 RX ORDER — OXYCODONE HYDROCHLORIDE 5 MG/1
5 TABLET ORAL
Status: DISCONTINUED | OUTPATIENT
Start: 2022-06-07 | End: 2022-06-14 | Stop reason: HOSPADM

## 2022-06-07 RX ORDER — LIDOCAINE HYDROCHLORIDE 20 MG/ML
INJECTION, SOLUTION EPIDURAL; INFILTRATION; INTRACAUDAL; PERINEURAL AS NEEDED
Status: DISCONTINUED | OUTPATIENT
Start: 2022-06-07 | End: 2022-06-07 | Stop reason: HOSPADM

## 2022-06-07 RX ORDER — FACIAL-BODY WIPES
10 EACH TOPICAL DAILY PRN
Status: DISCONTINUED | OUTPATIENT
Start: 2022-06-09 | End: 2022-06-14 | Stop reason: HOSPADM

## 2022-06-07 RX ORDER — NALOXONE HYDROCHLORIDE 0.4 MG/ML
0.4 INJECTION, SOLUTION INTRAMUSCULAR; INTRAVENOUS; SUBCUTANEOUS AS NEEDED
Status: DISCONTINUED | OUTPATIENT
Start: 2022-06-07 | End: 2022-06-14 | Stop reason: HOSPADM

## 2022-06-07 RX ORDER — MIDAZOLAM HYDROCHLORIDE 1 MG/ML
1 INJECTION, SOLUTION INTRAMUSCULAR; INTRAVENOUS AS NEEDED
Status: DISCONTINUED | OUTPATIENT
Start: 2022-06-07 | End: 2022-06-07 | Stop reason: HOSPADM

## 2022-06-07 RX ORDER — OXYCODONE HYDROCHLORIDE 5 MG/1
2.5 TABLET ORAL
Status: DISCONTINUED | OUTPATIENT
Start: 2022-06-07 | End: 2022-06-14 | Stop reason: HOSPADM

## 2022-06-07 RX ORDER — FERROUS SULFATE, DRIED 160(50) MG
1 TABLET, EXTENDED RELEASE ORAL
Status: DISCONTINUED | OUTPATIENT
Start: 2022-06-08 | End: 2022-06-14 | Stop reason: HOSPADM

## 2022-06-07 RX ORDER — HYDRALAZINE HYDROCHLORIDE 20 MG/ML
10 INJECTION INTRAMUSCULAR; INTRAVENOUS ONCE
Status: COMPLETED | OUTPATIENT
Start: 2022-06-07 | End: 2022-06-07

## 2022-06-07 RX ORDER — NEOSTIGMINE METHYLSULFATE 1 MG/ML
INJECTION, SOLUTION INTRAVENOUS AS NEEDED
Status: DISCONTINUED | OUTPATIENT
Start: 2022-06-07 | End: 2022-06-07 | Stop reason: HOSPADM

## 2022-06-07 RX ORDER — DEXAMETHASONE SODIUM PHOSPHATE 4 MG/ML
INJECTION, SOLUTION INTRA-ARTICULAR; INTRALESIONAL; INTRAMUSCULAR; INTRAVENOUS; SOFT TISSUE AS NEEDED
Status: DISCONTINUED | OUTPATIENT
Start: 2022-06-07 | End: 2022-06-07 | Stop reason: HOSPADM

## 2022-06-07 RX ORDER — HALOPERIDOL 5 MG/ML
2 INJECTION INTRAMUSCULAR
Status: DISCONTINUED | OUTPATIENT
Start: 2022-06-07 | End: 2022-06-09

## 2022-06-07 RX ORDER — SODIUM CHLORIDE 9 MG/ML
50 INJECTION, SOLUTION INTRAVENOUS CONTINUOUS
Status: DISCONTINUED | OUTPATIENT
Start: 2022-06-07 | End: 2022-06-07 | Stop reason: HOSPADM

## 2022-06-07 RX ORDER — FENTANYL CITRATE 50 UG/ML
25 INJECTION, SOLUTION INTRAMUSCULAR; INTRAVENOUS
Status: DISCONTINUED | OUTPATIENT
Start: 2022-06-07 | End: 2022-06-07 | Stop reason: HOSPADM

## 2022-06-07 RX ORDER — MORPHINE SULFATE 2 MG/ML
2 INJECTION, SOLUTION INTRAMUSCULAR; INTRAVENOUS
Status: DISCONTINUED | OUTPATIENT
Start: 2022-06-07 | End: 2022-06-07 | Stop reason: HOSPADM

## 2022-06-07 RX ORDER — FENTANYL CITRATE 50 UG/ML
INJECTION, SOLUTION INTRAMUSCULAR; INTRAVENOUS AS NEEDED
Status: DISCONTINUED | OUTPATIENT
Start: 2022-06-07 | End: 2022-06-07 | Stop reason: HOSPADM

## 2022-06-07 RX ORDER — ONDANSETRON 2 MG/ML
4 INJECTION INTRAMUSCULAR; INTRAVENOUS AS NEEDED
Status: DISCONTINUED | OUTPATIENT
Start: 2022-06-07 | End: 2022-06-07 | Stop reason: HOSPADM

## 2022-06-07 RX ORDER — GLYCOPYRROLATE 0.2 MG/ML
INJECTION INTRAMUSCULAR; INTRAVENOUS AS NEEDED
Status: DISCONTINUED | OUTPATIENT
Start: 2022-06-07 | End: 2022-06-07 | Stop reason: HOSPADM

## 2022-06-07 RX ORDER — OXYCODONE AND ACETAMINOPHEN 5; 325 MG/1; MG/1
1 TABLET ORAL AS NEEDED
Status: DISCONTINUED | OUTPATIENT
Start: 2022-06-07 | End: 2022-06-07 | Stop reason: HOSPADM

## 2022-06-07 RX ORDER — MIDAZOLAM HYDROCHLORIDE 1 MG/ML
0.5 INJECTION, SOLUTION INTRAMUSCULAR; INTRAVENOUS
Status: DISCONTINUED | OUTPATIENT
Start: 2022-06-07 | End: 2022-06-07 | Stop reason: HOSPADM

## 2022-06-07 RX ORDER — FENTANYL CITRATE 50 UG/ML
50 INJECTION, SOLUTION INTRAMUSCULAR; INTRAVENOUS AS NEEDED
Status: DISCONTINUED | OUTPATIENT
Start: 2022-06-07 | End: 2022-06-07 | Stop reason: HOSPADM

## 2022-06-07 RX ORDER — MORPHINE SULFATE 2 MG/ML
1 INJECTION, SOLUTION INTRAMUSCULAR; INTRAVENOUS
Status: DISCONTINUED | OUTPATIENT
Start: 2022-06-07 | End: 2022-06-08

## 2022-06-07 RX ORDER — ONDANSETRON 2 MG/ML
INJECTION INTRAMUSCULAR; INTRAVENOUS AS NEEDED
Status: DISCONTINUED | OUTPATIENT
Start: 2022-06-07 | End: 2022-06-07 | Stop reason: HOSPADM

## 2022-06-07 RX ADMIN — PROPOFOL 100 MG: 10 INJECTION, EMULSION INTRAVENOUS at 13:48

## 2022-06-07 RX ADMIN — ATORVASTATIN CALCIUM 10 MG: 10 TABLET, FILM COATED ORAL at 21:07

## 2022-06-07 RX ADMIN — DONEPEZIL HYDROCHLORIDE 5 MG: 5 TABLET, FILM COATED ORAL at 21:08

## 2022-06-07 RX ADMIN — LEVETIRACETAM 500 MG: 100 INJECTION INTRAVENOUS at 10:19

## 2022-06-07 RX ADMIN — WATER 2 G: 1 INJECTION INTRAMUSCULAR; INTRAVENOUS; SUBCUTANEOUS at 13:58

## 2022-06-07 RX ADMIN — FENTANYL CITRATE 25 MCG: 50 INJECTION, SOLUTION INTRAMUSCULAR; INTRAVENOUS at 15:47

## 2022-06-07 RX ADMIN — ROCURONIUM BROMIDE 5 MG: 10 SOLUTION INTRAVENOUS at 13:48

## 2022-06-07 RX ADMIN — ACETAMINOPHEN 650 MG: 325 TABLET ORAL at 19:03

## 2022-06-07 RX ADMIN — SODIUM CHLORIDE, PRESERVATIVE FREE 5 ML: 5 INJECTION INTRAVENOUS at 06:00

## 2022-06-07 RX ADMIN — FENTANYL CITRATE 50 MCG: 50 INJECTION, SOLUTION INTRAMUSCULAR; INTRAVENOUS at 14:32

## 2022-06-07 RX ADMIN — SERTRALINE 50 MG: 50 TABLET, FILM COATED ORAL at 10:25

## 2022-06-07 RX ADMIN — HYDRALAZINE HYDROCHLORIDE 10 MG: 20 INJECTION, SOLUTION INTRAMUSCULAR; INTRAVENOUS at 15:38

## 2022-06-07 RX ADMIN — ONDANSETRON HYDROCHLORIDE 4 MG: 2 INJECTION, SOLUTION INTRAMUSCULAR; INTRAVENOUS at 14:47

## 2022-06-07 RX ADMIN — SODIUM CHLORIDE, PRESERVATIVE FREE 10 ML: 5 INJECTION INTRAVENOUS at 19:04

## 2022-06-07 RX ADMIN — LEVETIRACETAM 500 MG: 100 INJECTION INTRAVENOUS at 21:07

## 2022-06-07 RX ADMIN — HYDRALAZINE HYDROCHLORIDE 10 MG: 20 INJECTION INTRAMUSCULAR; INTRAVENOUS at 15:38

## 2022-06-07 RX ADMIN — QUETIAPINE FUMARATE 12.5 MG: 25 TABLET ORAL at 10:25

## 2022-06-07 RX ADMIN — QUETIAPINE FUMARATE 25 MG: 25 TABLET ORAL at 21:07

## 2022-06-07 RX ADMIN — DEXAMETHASONE SODIUM PHOSPHATE 4 MG: 4 INJECTION, SOLUTION INTRAMUSCULAR; INTRAVENOUS at 14:09

## 2022-06-07 RX ADMIN — LIDOCAINE HYDROCHLORIDE 50 MG: 20 INJECTION, SOLUTION EPIDURAL; INFILTRATION; INTRACAUDAL; PERINEURAL at 13:48

## 2022-06-07 RX ADMIN — GLYCOPYRROLATE 0.4 MG: 0.2 INJECTION, SOLUTION INTRAMUSCULAR; INTRAVENOUS at 14:50

## 2022-06-07 RX ADMIN — ROCURONIUM BROMIDE 25 MG: 10 SOLUTION INTRAVENOUS at 13:53

## 2022-06-07 RX ADMIN — SODIUM CHLORIDE 125 ML/HR: 9 INJECTION, SOLUTION INTRAVENOUS at 15:49

## 2022-06-07 RX ADMIN — MORPHINE SULFATE 1 MG: 2 INJECTION, SOLUTION INTRAMUSCULAR; INTRAVENOUS at 10:44

## 2022-06-07 RX ADMIN — CARBIDOPA AND LEVODOPA 1 TABLET: 25; 100 TABLET, EXTENDED RELEASE ORAL at 10:26

## 2022-06-07 RX ADMIN — FENTANYL CITRATE 25 MCG: 50 INJECTION, SOLUTION INTRAMUSCULAR; INTRAVENOUS at 13:48

## 2022-06-07 RX ADMIN — FENTANYL CITRATE 25 MCG: 50 INJECTION, SOLUTION INTRAMUSCULAR; INTRAVENOUS at 16:04

## 2022-06-07 RX ADMIN — SODIUM CHLORIDE, POTASSIUM CHLORIDE, SODIUM LACTATE AND CALCIUM CHLORIDE 75 ML/HR: 600; 310; 30; 20 INJECTION, SOLUTION INTRAVENOUS at 12:20

## 2022-06-07 RX ADMIN — SODIUM CHLORIDE, POTASSIUM CHLORIDE, SODIUM LACTATE AND CALCIUM CHLORIDE: 600; 310; 30; 20 INJECTION, SOLUTION INTRAVENOUS at 13:46

## 2022-06-07 RX ADMIN — CARBIDOPA AND LEVODOPA 1.5 TABLET: 25; 100 TABLET ORAL at 19:03

## 2022-06-07 RX ADMIN — PHENYLEPHRINE HYDROCHLORIDE 40 MCG/MIN: 10 INJECTION INTRAVENOUS at 14:01

## 2022-06-07 RX ADMIN — CARBIDOPA AND LEVODOPA 1.5 TABLET: 25; 100 TABLET ORAL at 10:25

## 2022-06-07 RX ADMIN — NEOSTIGMINE METHYLSULFATE 2 MG: 1 INJECTION, SOLUTION INTRAVENOUS at 14:50

## 2022-06-07 NOTE — PROGRESS NOTES
Transition of Care Plan: TBD: likely SNF vs IPR. Pending medical/therapy recommendations. Need therapy orders for evaluation   Lives at [facility]: 10% low     PCP F/U: Kings Cruz MD     Disposition: SNF vs IPR     Transportation: S     Main Contact: Primary Decision Maker: Lorrie Esqueda Son - [phone]    Secondary Decision Maker: Erica Jacinto Child - [phone]: MD has asked this CM to discuss rehab with family and obtain potential choices. Patient DAVI for surgery. Spoke with daughter Arin Sauceda to discuss rehab/SNF after surgery. Informed her that therapy will provide their recommendations for SNF vs IPR after working with patient. Daughter states if the recommendation is IPR, then [facility] would be her first and second choice. However, is unsure which SNF would be preference. Has asked this CM to leave a list in the room for her. SNF list left at bedside.      Emily Whitney RN, CRM

## 2022-06-07 NOTE — PROGRESS NOTES
Problem: Pressure Injury - Risk of  Goal: *Prevention of pressure injury  Description: Document Salty Scale and appropriate interventions in the flowsheet. Outcome: Progressing Towards Goal  Note: Pressure Injury Interventions:  Sensory Interventions: Float heels,Turn and reposition approx. every two hours (pillows and wedges if needed),Minimize linen layers    Moisture Interventions: Limit adult briefs,Minimize layers,Apply protective barrier, creams and emollients    Activity Interventions: PT/OT evaluation,Increase time out of bed    Mobility Interventions: PT/OT evaluation,Turn and reposition approx. every two hours(pillow and wedges)         Friction and Shear Interventions: Lift sheet,Apply protective barrier, creams and emollients,Minimize layers                Problem: Patient Education: Go to Patient Education Activity  Goal: Patient/Family Education  Outcome: Progressing Towards Goal     Problem: Falls - Risk of  Goal: *Absence of Falls  Description: Document Ana Fall Risk and appropriate interventions in the flowsheet.   Outcome: Progressing Towards Goal  Note: Fall Risk Interventions:  Mobility Interventions: Patient to call before getting OOB,Strengthening exercises (ROM-active/passive),Bed/chair exit alarm    Mentation Interventions: Bed/chair exit alarm,Adequate sleep, hydration, pain control,Reorient patient    Medication Interventions: Teach patient to arise slowly,Patient to call before getting OOB,Bed/chair exit alarm    Elimination Interventions: Call light in reach,Bed/chair exit alarm    History of Falls Interventions: Bed/chair exit alarm,Room close to nurse's station         Problem: Patient Education: Go to Patient Education Activity  Goal: Patient/Family Education  Outcome: Progressing Towards Goal     Problem: Hypertension  Goal: *Blood pressure within specified parameters  Outcome: Progressing Towards Goal  Goal: *Fluid volume balance  Outcome: Progressing Towards Goal  Goal: *Labs within defined limits  Outcome: Progressing Towards Goal     Problem: Patient Education: Go to Patient Education Activity  Goal: Patient/Family Education  Outcome: Progressing Towards Goal

## 2022-06-07 NOTE — BRIEF OP NOTE
Brief Postoperative Note    Patient: Miguel Ángel Rowe  YOB: 1935  MRN: [id number]    Date of Procedure: 6/7/2022     Pre-Op Diagnosis: unknown    Post-Op Diagnosis: Same as preoperative diagnosis. Procedure(s):  RIGHT IM NAIL/ HANA TABLE/ SYNTHES NAIL INTERMEDIATE  ESSENTIAL LENGTH  REQ TF    Surgeon(s):  Michael Shelley MD    Surgical Assistant: Surg Asst-1: Dewayne Michel    Anesthesia: General     Estimated Blood Loss (mL): less than 50     Complications: None    Specimens: * No specimens in log *     Implants:   Implant Name Type Inv. Item Serial No.  Lot No. LRB No. Used Action   NAIL IM [id number] [id number] 125DEG SHT GRN R PROX FEM TI - SN/A  NAIL IM L235MM [id number] 125DEG SHT GRN R PROX FEM TI N/A DEPU[employer]NTH[employer]_ [id number] Right 1 Implanted   BLADE IM L85MM DIA10.35MM PROX FEM G TI INDY FEN JASWINDER FOR - SN/A  BLADE IM L85MM DIA10.35MM PROX FEM G TI INDY FEN JASWINDER FOR N/A DEPU[employer] SYNTH[id number] Right 1 Implanted       Drains:   [REMOVED] External Urinary Catheter 06/06/22 (Removed)   Site Assessment Clean, dry, & intact 06/06/22 0824   Perineal Care Yes 06/06/22 0824       Findings: See full op report.      Electronically Signed by Sharda Villareal MD on 6/7/2022 at 2:57 PM

## 2022-06-07 NOTE — ANESTHESIA POSTPROCEDURE EVALUATION
Procedure(s):  RIGHT IM NAIL/ HANA TABLE/ SYNTHES NAIL INTERMEDIATE  ESSENTIAL LENGTH  REQ TF.    general    Anesthesia Post Evaluation        Patient location during evaluation: PACU  Patient participation: complete - patient participated  Level of consciousness: awake and alert  Pain management: adequate  Airway patency: patent  Anesthetic complications: no  Cardiovascular status: acceptable  Respiratory status: acceptable  Hydration status: acceptable  Comments: I have seen and evaluated the patient and is ready for discharge.  Robbi Frankel, MD    Post anesthesia nausea and vomiting:  none      INITIAL Post-op Vital signs:   Vitals Value Taken Time   /57 06/07/22 1512   Temp 36.5 °C (97.7 °F) 06/07/22 1512   Pulse 79 06/07/22 1512   Resp 17 06/07/22 1512   SpO2 100 % 06/07/22 1512

## 2022-06-07 NOTE — PERIOP NOTES
TRANSFER - OUT REPORT:    Verbal report given to Shlomo Sharma RN on Velia Pompa  being transferred to [phone] for routine post - op       Report consisted of patients Situation, Background, Assessment and   Recommendations(SBAR). Time Pre op antibiotic given:1358  Anesthesia Stop time: 3112  Tomas Present on Transfer to floor:yes  Order for Tomas on Chart:yes  Discharge Prescriptions with Chart:no    Information from the following report(s) SBAR, OR Summary, Intake/Output and MAR was reviewed with the receiving nurse. Opportunity for questions and clarification was provided. Is the patient on 02? NO       L/Min -       Other -    Is the patient on a monitor? YES    Is the nurse transporting with the patient? YES    Surgical Waiting Area notified of patient's transfer from PACU? NO      The following personal items collected during your admission accompanied patient upon transfer:   Dental Appliance: Dental Appliances:  (denture cup at top of bed in pacu)  Vision: Visual Aid: None  Hearing Aid:    Jewelry: Jewelry: None  Clothing: Clothing: None  Other Valuables:  Other Valuables: None  Valuables sent to safe:

## 2022-06-07 NOTE — PROGRESS NOTES
Bedside shift change report given to Becky Saez RN (oncoming nurse) by Loney Duverney, RN (offgoing nurse).  Report included the following information SBAR, Kardex, ED Summary, Intake/Output, MAR and Cardiac Rhythm ST.

## 2022-06-07 NOTE — ANESTHESIA PREPROCEDURE EVALUATION
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 Mild (grade 1) left ventricular diastolic dysfunction. · Mild tricuspid valve regurgitation is present. Mild pulmonary hypertension is present. EKG NSR   GI/Hepatic/Renal     GERD           Endo/Other             Other Findings   Comments:  On plavix  Subdural           Physical Exam    Airway  Mallampati: II  TM Distance: 4 - 6 cm  Neck ROM: normal range of motion   Mouth opening: Normal     Cardiovascular    Rhythm: regular  Rate: normal         Dental    Dentition: Edentulous, Full lower dentures and Full upper dentures     Pulmonary  Breath sounds clear to auscultation               Abdominal  Abdominal exam normal       Other Findings            Anesthetic Plan    ASA: 4  Anesthesia type: general          Induction: Intravenous  Anesthetic plan and risks discussed with: Patient and Family

## 2022-06-07 NOTE — CONSULTS
ORTHOPEDIC ATTENDING CONSULT NOTE     Subjective:      Date of Consultation:  June 6, 2022  Referring Physician:  Steffen Higuera MD   Pt seen and examined personally. Racquel Pulido is a 80 y.o. female who is being seen for RIGHT hip fracture. The pt resides at [facility]. The patient suffered a GLF on a carpeted floor. The patient reports that she was wearing slippers and using her rollator and fell. She states she tripped on her slippers, causing her to fall. She was unable to bear weight following the fall and was brought to [facility] ED for further eval and mgmt. She underwent XR which showed an Right intertrochanteric femur fracture. She reports right hip pain. Denies distal paresthesias. Discussed pt with daughter today who is a physician. Of note, the patient did strike her head during the fall, no reported LOC. She was found to have a Moderately large acute on chronic right frontoparietal subdural hematoma and acute on chronic left frontoparietal subdural hematoma on CT. She takes Plavix. She is under supervision of neurosurgery for this finding. At baseline the patient is a minimal household ambulator and can stand to transfer.          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 ([address] 02/23/2019               Family History   Problem Relation Age of Onset    Stroke Mother      Heart Disease Father        Social History            Tobacco Use    Smoking status: Never Smoker    Smokeless tobacco: Not on file   Substance Use Topics    Alcohol use:  Yes       Comment: occasional           Past Medical History:   Diagnosis Date    GERD (gastroesophageal reflux disease)      Heart failure (HCC)      Hypertension        Past Surgical History: Procedure Laterality Date    BREAST SURGERY PROCEDURE UNLISTED         l breast 2 cyst    HX GYN         hysterectomy    HX HEENT         catacacts removed              Prior to Admission medications    Medication Sig Start Date End Date Taking? Authorizing Provider   levETIRAcetam (Keppra) 500 mg tablet Take 1 Tab by mouth two (2) times a day. 11/16/20     Fatou Younger MD   atorvastatin (LIPITOR) 40 mg tablet Take 1 Tab by mouth daily. 2/28/19     Nai ALONSO MD   ferrous sulfate 325 mg (65 mg iron) tablet Take 325 mg by mouth two (2) times a day.       Provider, Historical   Dexlansoprazole (DEXILANT) 60 mg CpDM Take 60 mg by mouth daily.       Provider, Historical   sucralfate (CARAFATE) 1 gram tablet Take 1 g by mouth three (3) times daily (with meals).        Provider, Historical   ondansetron (ZOFRAN ODT) 4 mg disintegrating tablet Take 4 mg by mouth every eight (8) hours as needed for Nausea.       Provider, Historical   fluticasone-salmeterol (ADVAIR HFA) 115-21 mcg/actuation inhaler Take 1 Puff by inhalation nightly.       Provider, Historical   polyethylene glycol (MIRALAX) 17 gram packet Take 17 g by mouth as needed.       Provider, Historical   acetaminophen (TYLENOL ARTHRITIS PAIN) 650 mg CR tablet Take 650 mg by mouth every six (6) hours as needed for Pain.       Provider, Historical             Current Facility-Administered Medications   Medication Dose Route Frequency    sucralfate (CARAFATE) tablet 1 g  1 g Oral TID WITH MEALS    pantoprazole (PROTONIX) tablet 40 mg  40 mg Oral ACB    0.9% sodium chloride infusion  75 mL/hr IntraVENous CONTINUOUS    sodium chloride (NS) flush 5-40 mL  5-40 mL IntraVENous Q8H    sodium chloride (NS) flush 5-40 mL  5-40 mL IntraVENous PRN    naloxone (NARCAN) injection 0.4 mg  0.4 mg IntraVENous PRN    acetaminophen (TYLENOL) tablet 650 mg  650 mg Oral Q6H PRN    morphine injection 1 mg  1 mg IntraVENous Q4H PRN    ondansetron (ZOFRAN) injection 4 mg  4 mg IntraVENous Q6H PRN    carbidopa-levodopa (SINEMET)  mg per tablet 1 Tablet  1 Tablet Oral BID    polyethylene glycol (MIRALAX) packet 17 g  17 g Oral DAILY    QUEtiapine (SEROquel) tablet 25 mg  25 mg Oral QHS    QUEtiapine (SEROquel) tablet 12.5 mg  12.5 mg Oral DAILY AFTER BREAKFAST    midodrine (PROAMATINE) tablet 2.5 mg  2.5 mg Oral DAILY AFTER BREAKFAST    carbidopa-levodopa ER (SINEMET CR)  mg per tablet 1 Tablet  1 Tablet Oral DAILY AFTER BREAKFAST    donepeziL (ARICEPT) tablet 5 mg  5 mg Oral QHS    levETIRAcetam (KEPPRA) injection 500 mg  500 mg IntraVENous Q12H           Current Outpatient Medications   Medication Sig    levETIRAcetam (Keppra) 500 mg tablet Take 1 Tab by mouth two (2) times a day.  atorvastatin (LIPITOR) 40 mg tablet Take 1 Tab by mouth daily.  ferrous sulfate 325 mg (65 mg iron) tablet Take 325 mg by mouth two (2) times a day.  Dexlansoprazole (DEXILANT) 60 mg CpDM Take 60 mg by mouth daily.  sucralfate (CARAFATE) 1 gram tablet Take 1 g by mouth three (3) times daily (with meals).  ondansetron (ZOFRAN ODT) 4 mg disintegrating tablet Take 4 mg by mouth every eight (8) hours as needed for Nausea.  fluticasone-salmeterol (ADVAIR HFA) 115-21 mcg/actuation inhaler Take 1 Puff by inhalation nightly.  polyethylene glycol (MIRALAX) 17 gram packet Take 17 g by mouth as needed.  acetaminophen (TYLENOL ARTHRITIS PAIN) 650 mg CR tablet Take 650 mg by mouth every six (6) hours as needed for Pain.       No Known Allergies      Review of Systems:  A comprehensive review of systems was negative except for that written in the HPI.        Objective:      Patient Vitals for the past 8 hrs:    BP Temp Pulse Resp SpO2   22 0642 (!) 189/93 98.3 °F (36.8 °C) 83 16 99 %      Temp (24hrs), Av.3 °F (36.8 °C), Min:98.3 °F (36.8 °C), Max:98.3 °F (36.8 °C)           ORTHO EXAM:  alert, cooperative, no distress, appears stated age  RLE MSK: Right hip without abrasions. Bony tenderness to the anterior and lateral hip. No obvious deformity at the hip noted. Right leg is shortened with external rotation. Sensation to light touch intact to the medial/lat/posterior aspects of the foot. Compartments of the thigh and calf are soft and compressible, without pain. Able to wiggle toes. Passive flexion of great toe without pain. Minimal ankle dorsiflexion and plantar flexion due to pain. Pain with minimal passive internal/external rotation of the hip. 2+ DP pulses. Cap refill brisk.     BL UE and LLE: atraumatic and NTTP grossly NVI moving spont, wwp        IMAGING REVIEW:  EXAM: XR HIPS BI W OR WO AP PELV     INDICATION: fall, pain.     COMPARISON: None.     FINDINGS: 3 views bilateral hips. AP view of the pelvis and frogleg lateral  views of both hips demonstrate a mildly displaced intertrochanteric fracture of  the right hip. There is no dislocation. Mild bilateral hip osteoarthritis is  present. There are degenerative changes in the lumbar spine.     IMPRESSION  Mildly displaced intertrochanteric right hip fracture     Labs:   Recent Results          Recent Results (from the past 24 hour(s))   SAMPLES BEING HELD     Collection Time: 06/06/22  6:52 AM   Result Value Ref Range     SAMPLES BEING HELD 1BLU,1RED       COMMENT           Add-on orders for these samples will be processed based on acceptable specimen integrity and analyte stability, which may vary by analyte.    CBC WITH AUTOMATED DIFF     Collection Time: 06/06/22  6:52 AM   Result Value Ref Range     WBC 4.2 3.6 - 11.0 K/uL     RBC 4.44 3.80 - 5.20 M/uL     HGB 12.4 11.5 - 16.0 g/dL     HCT 39.6 35.0 - 47.0 %     MCV 89.2 80.0 - 99.0 FL     MCH 27.9 26.0 - 34.0 PG     MCHC 31.3 30.0 - 36.5 g/dL     RDW 15.7 (H) 11.5 - 14.5 %     PLATELET 941 929 - 202 K/uL     MPV 9.4 8.9 - 12.9 FL     NRBC 0.0 0  WBC     ABSOLUTE NRBC 0.00 0.00 - 0.01 K/uL     NEUTROPHILS 32 32 - 75 %     LYMPHOCYTES 55 (H) 12 - 49 %     MONOCYTES 10 5 - 13 %     EOSINOPHILS 1 0 - 7 %     BASOPHILS 1 0 - 1 %     IMMATURE GRANULOCYTES 1 (H) 0.0 - 0.5 %     ABS. NEUTROPHILS 1.4 (L) 1.8 - 8.0 K/UL     ABS. LYMPHOCYTES 2.3 0.8 - 3.5 K/UL     ABS. MONOCYTES 0.4 0.0 - 1.0 K/UL     ABS. EOSINOPHILS 0.1 0.0 - 0.4 K/UL     ABS. BASOPHILS 0.0 0.0 - 0.1 K/UL     ABS. IMM. GRANS. 0.0 0.00 - 0.04 K/UL     DF AUTOMATED     METABOLIC PANEL, BASIC     Collection Time: 06/06/22  6:52 AM   Result Value Ref Range     Sodium 142 136 - 145 mmol/L     Potassium 4.4 3.5 - 5.1 mmol/L     Chloride 106 97 - 108 mmol/L     CO2 31 21 - 32 mmol/L     Anion gap 5 5 - 15 mmol/L     Glucose 115 (H) 65 - 100 mg/dL     BUN 16 6 - 20 MG/DL     Creatinine 0.88 0.55 - 1.02 MG/DL     BUN/Creatinine ratio 18 12 - 20       GFR est AA >60 >60 ml/min/1.73m2     GFR est non-AA >60 >60 ml/min/1.73m2     Calcium 9.6 8.5 - 10.1 MG/DL               Impression:           Patient Active Problem List     Diagnosis Date Noted    Hip fracture ([facility] 06/06/2022    Orthostatic hypotension 02/28/2019    Parkinson disease ([facility] 02/28/2019    Internal carotid artery dissection ([facility] 02/24/2019    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 Discussed the presentation and nature of the fracture to her daughter at length, I explained that overall, given her pain and injury type that stabilization is warranted, when it is safe in regrds to the intracranial bleed. family agree and undertsand. Neurosurg plan to repeat CT on 6/7 AM and determine clearance at that time, we will hold off our plans for surgery today. Medical eval appreciated.    All risks and benefits of op vs non op tx explained, including but not limited to \"infection, bleeding, scarring, nerve and vessel damage, non/mal union, dvt, pe, limbloss death, pain, revision surgery, kameron-implant fracture, exacerbation of baseline edical issues etc.   Consent signed.     Plan:   ORTHOPEDIC PLAN:    - Plan to take the pt to the OR for a right trochanteric femur nail when cleared  - Pt will need to be medically optimized prior to surgery.  - Neurosurg recs for no surgery today and will obtain CT head tomorrow am. Once resulted will determine if pt can have surgery tomorrow. Neurosurg also recommended to limit anticoag following hip surgery.   - NPO at midnight  - Consented and posted for tomorrow. Her children are her POAs.

## 2022-06-07 NOTE — PROGRESS NOTES
[facility] Adult  Hospitalist Group                                                                                          Hospitalist Progress Note  Nina Garcia MD  Answering service: [phone] from in house phone        Date of Service:  2022  NAME:  Kimberly Erickson  :  1935  MRN:  [id number]      Admission Summary:   Kimberly Erickson is a 80 y.o. female who presents with ground level fall. Patient came from [address] [place] [address] home after reported ground-level fall EMS said that she had fall on a carpeted floor did not strike her head with no loss of consciousness she takes Plavix daily as per daughter for previous strokes. She was initially complaining of left-sided hip pain during transport she also complains of right hip pain as well denies any headache vomiting loss of consciousness patient was alert oriented and could give history as well in the ED. x-ray showing right hip fracture and CT scan of the head showing subdural hemorrhage     The patient denies any fever, chills, chest pain, cough, congestion, recent illness, palpitations, or dysuria       Interval history / Subjective:   F/u hip fracture  Comfortably laying in bed  Just received morphine IV, pain controlled  NPO  Daughter and caregiver in the room     Assessment & Plan:     Fall with mildly displaced intertrochanteric right hip fracture  -Pain control with IV and oral pain medication  -Appreciate discussion with Ortho, plan for OR today  -Spoke to the daughter that since she had SDH we would not be able to anticoagulate the patient till patient is cleared from neurosurgery standpoint and that is why she runs the risk of getting DVT/PE.  She understands.     Subdural hemorrhage  -Moderately large acute on chronic patient is on Plavix (holding)  -Repeat CT head stable  -Appreciate neurosurgery, cleared for surgery      History of parkinsonism/Orthostatic hypotension/Dementia  -Continue Sinemet with a complex dosing/Continue midodrine  -supportive care  -fall precautions  -discussed with the daughter in detail about potential for hospital delirium and non medical ways to prevent that, she understands    History of CVA (2/2019): On plavix (currently on hold)     DVT prophylaxis SCD  GI prophylaxis patient is on PPI and Carafate at home  Further management as per clinical course     Code status: FULL CODE  Prophylaxis: scd  PTA: [facility],[address]: Surgery today, pain control. Likely the patient would need SNF at discharge, daughter is fine with that. Spoke to the CM, she will talk to the daughter    Care Plan discussed with: Patient, daughter  Anticipated Disposition: TBD     Hospital Problems  Date Reviewed: 2/28/2019          Codes Class Noted POA    * (Principal) Hip fracture [facility]) ICD-10-CM: W75.069U  ICD-9-CM: 820.8  6/6/2022 Unknown                Review of Systems:   A comprehensive review of systems was negative except for that written in the HPI. Vital Signs:    Last 24hrs VS reviewed since prior progress note. Most recent are:  Visit Vitals  BP (!) 168/74 (BP 1 Location: Right upper arm, BP Patient Position: At rest)   Pulse 85   Temp 98.4 °F (36.9 °C)   Resp 21   SpO2 96%         Intake/Output Summary (Last 24 hours) at 6/7/2022 1052  Last data filed at 6/7/2022 0716  Gross per 24 hour   Intake --   Output 800 ml   Net -800 ml        Physical Examination:     I had a face to face encounter with this patient and independently examined them on 6/7/2022 as outlined below:          Constitutional:  No acute distress   ENT:  Oral mucosa moist, oropharynx benign. Resp:  CTA bilaterally. No wheezing/rhonchi/rales. No accessory muscle use. CV:  Regular rhythm, normal rate, no murmurs, gallops, rubs    GI:  Soft, non distended, non tender. normoactive bowel sounds, no hepatosplenomegaly     Musculoskeletal:  No edema, warm, 2+ pulses throughout    Neurologic:  Moves all extremities.   AAOx3, CN II-XII reviewed            Data Review:    Review and/or order of clinical lab test      Labs:     Recent Labs     06/06/22 0652   WBC 4.2   HGB 12.4   HCT 39.6        Recent Labs     06/06/22 0652      K 4.4      CO2 31   BUN 16   CREA 0.88   *   CA 9.6     No results for input(s): ALT, AP, TBIL, TBILI, TP, ALB, GLOB, GGT, AML, LPSE in the last 72 hours. No lab exists for component: SGOT, GPT, AMYP, HLPSE  Recent Labs     06/06/22 0652   INR 1.0   PTP 10.3      No results for input(s): FE, TIBC, PSAT, FERR in the last 72 hours. No results found for: FOL, RBCF   No results for input(s): PH, PCO2, PO2 in the last 72 hours. No results for input(s): CPK, CKNDX, TROIQ in the last 72 hours.     No lab exists for component: CPKMB  Lab Results   Component Value Date/Time    Cholesterol, total 142 02/26/2019 03:46 AM    HDL Cholesterol 86 02/26/2019 03:46 AM    LDL, calculated 38.8 02/26/2019 03:46 AM    Triglyceride 86 02/26/2019 03:46 AM    CHOL/HDL Ratio 1.7 02/26/2019 03:46 AM     Lab Results   Component Value Date/Time    Glucose (POC) 97 02/23/2019 12:11 PM     Lab Results   Component Value Date/Time    Color YELLOW/STRAW 06/06/2022 09:56 AM    Appearance CLEAR 06/06/2022 09:56 AM    Specific gravity 1.010 06/06/2022 09:56 AM    Specific gravity 1.010 02/23/2019 02:03 PM    pH (UA) 7.5 06/06/2022 09:56 AM    Protein Negative 06/06/2022 09:56 AM    Glucose Negative 06/06/2022 09:56 AM    Ketone Negative 06/06/2022 09:56 AM    Bilirubin Negative 06/06/2022 09:56 AM    Urobilinogen 0.2 06/06/2022 09:56 AM    Nitrites Negative 06/06/2022 09:56 AM    Leukocyte Esterase Negative 06/06/2022 09:56 AM    Epithelial cells FEW 06/06/2022 09:56 AM    Bacteria Negative 06/06/2022 09:56 AM    WBC 0-4 06/06/2022 09:56 AM    RBC 0-5 06/06/2022 09:56 AM         Medications Reviewed:     Current Facility-Administered Medications   Medication Dose Route Frequency    pantoprazole (PROTONIX) tablet 40 mg 40 mg Oral ACB    0.9% sodium chloride infusion  75 mL/hr IntraVENous CONTINUOUS    sodium chloride (NS) flush 5-40 mL  5-40 mL IntraVENous Q8H    sodium chloride (NS) flush 5-40 mL  5-40 mL IntraVENous PRN    naloxone (NARCAN) injection 0.4 mg  0.4 mg IntraVENous PRN    acetaminophen (TYLENOL) tablet 650 mg  650 mg Oral Q6H PRN    morphine injection 1 mg  1 mg IntraVENous Q4H PRN    ondansetron (ZOFRAN) injection 4 mg  4 mg IntraVENous Q6H PRN    polyethylene glycol (MIRALAX) packet 17 g  17 g Oral DAILY    QUEtiapine (SEROquel) tablet 25 mg  25 mg Oral QHS    QUEtiapine (SEROquel) tablet 12.5 mg  12.5 mg Oral DAILY AFTER BREAKFAST    midodrine (PROAMATINE) tablet 2.5 mg  2.5 mg Oral DAILY AFTER BREAKFAST    donepeziL (ARICEPT) tablet 5 mg  5 mg Oral QHS    levETIRAcetam (KEPPRA) injection 500 mg  500 mg IntraVENous Q12H    sertraline (ZOLOFT) tablet 50 mg  50 mg Oral DAILY    atorvastatin (LIPITOR) tablet 10 mg  10 mg Oral QHS    carbidopa-levodopa ER (SINEMET CR)  mg per tablet 1 Tablet  1 Tablet Oral DAILY AFTER BREAKFAST    carbidopa-levodopa (SINEMET)  mg per tablet 1.5 Tablet  1.5 Tablet Oral BIDPC     ______________________________________________________________________  EXPECTED LENGTH OF STAY: - - -  ACTUAL LENGTH OF STAY:          1                 Mitchell D eLa Cruz MD

## 2022-06-08 ENCOUNTER — APPOINTMENT (OUTPATIENT)
Dept: CT IMAGING | Age: 87
DRG: 956 | End: 2022-06-08
Attending: NEUROLOGICAL SURGERY
Payer: MEDICARE

## 2022-06-08 LAB
ANION GAP SERPL CALC-SCNC: 6 MMOL/L (ref 5–15)
BUN SERPL-MCNC: 14 MG/DL (ref 6–20)
BUN/CREAT SERPL: 22 (ref 12–20)
CALCIUM SERPL-MCNC: 8.1 MG/DL (ref 8.5–10.1)
CHLORIDE SERPL-SCNC: 109 MMOL/L (ref 97–108)
CO2 SERPL-SCNC: 25 MMOL/L (ref 21–32)
CREAT SERPL-MCNC: 0.63 MG/DL (ref 0.55–1.02)
GLUCOSE BLD STRIP.AUTO-MCNC: 122 MG/DL (ref 65–117)
GLUCOSE SERPL-MCNC: 113 MG/DL (ref 65–100)
HGB BLD-MCNC: 8.1 G/DL (ref 11.5–16)
POTASSIUM SERPL-SCNC: 4 MMOL/L (ref 3.5–5.1)
SERVICE CMNT-IMP: ABNORMAL
SODIUM SERPL-SCNC: 140 MMOL/L (ref 136–145)

## 2022-06-08 PROCEDURE — 65270000046 HC RM TELEMETRY

## 2022-06-08 PROCEDURE — 85018 HEMOGLOBIN: CPT

## 2022-06-08 PROCEDURE — 70450 CT HEAD/BRAIN W/O DYE: CPT

## 2022-06-08 PROCEDURE — 74011000250 HC RX REV CODE- 250: Performed by: ORTHOPAEDIC SURGERY

## 2022-06-08 PROCEDURE — 74011250636 HC RX REV CODE- 250/636: Performed by: HOSPITALIST

## 2022-06-08 PROCEDURE — 74011250637 HC RX REV CODE- 250/637: Performed by: INTERNAL MEDICINE

## 2022-06-08 PROCEDURE — 74011250636 HC RX REV CODE- 250/636: Performed by: INTERNAL MEDICINE

## 2022-06-08 PROCEDURE — 82962 GLUCOSE BLOOD TEST: CPT

## 2022-06-08 PROCEDURE — 74011000250 HC RX REV CODE- 250: Performed by: HOSPITALIST

## 2022-06-08 PROCEDURE — 74011250637 HC RX REV CODE- 250/637: Performed by: ORTHOPAEDIC SURGERY

## 2022-06-08 PROCEDURE — 80048 BASIC METABOLIC PNL TOTAL CA: CPT

## 2022-06-08 PROCEDURE — 36415 COLL VENOUS BLD VENIPUNCTURE: CPT

## 2022-06-08 PROCEDURE — 74011250637 HC RX REV CODE- 250/637: Performed by: HOSPITALIST

## 2022-06-08 PROCEDURE — 74011250636 HC RX REV CODE- 250/636: Performed by: ORTHOPAEDIC SURGERY

## 2022-06-08 RX ORDER — ENOXAPARIN SODIUM 100 MG/ML
30 INJECTION SUBCUTANEOUS EVERY 24 HOURS
Status: DISCONTINUED | OUTPATIENT
Start: 2022-06-08 | End: 2022-06-14 | Stop reason: HOSPADM

## 2022-06-08 RX ORDER — ACETAMINOPHEN 500 MG
1000 TABLET ORAL EVERY 8 HOURS
Status: DISCONTINUED | OUTPATIENT
Start: 2022-06-08 | End: 2022-06-14 | Stop reason: HOSPADM

## 2022-06-08 RX ADMIN — LEVETIRACETAM 500 MG: 100 INJECTION INTRAVENOUS at 08:31

## 2022-06-08 RX ADMIN — LEVETIRACETAM 500 MG: 100 INJECTION INTRAVENOUS at 22:32

## 2022-06-08 RX ADMIN — SENNOSIDES AND DOCUSATE SODIUM 1 TABLET: 50; 8.6 TABLET ORAL at 08:29

## 2022-06-08 RX ADMIN — Medication 1 TABLET: at 17:42

## 2022-06-08 RX ADMIN — CARBIDOPA AND LEVODOPA 1 TABLET: 25; 100 TABLET, EXTENDED RELEASE ORAL at 08:29

## 2022-06-08 RX ADMIN — SODIUM CHLORIDE, PRESERVATIVE FREE 10 ML: 5 INJECTION INTRAVENOUS at 06:00

## 2022-06-08 RX ADMIN — Medication 1 TABLET: at 13:41

## 2022-06-08 RX ADMIN — CARBIDOPA AND LEVODOPA 1.5 TABLET: 25; 100 TABLET ORAL at 17:42

## 2022-06-08 RX ADMIN — CARBIDOPA AND LEVODOPA 1.5 TABLET: 25; 100 TABLET ORAL at 08:28

## 2022-06-08 RX ADMIN — CARBIDOPA AND LEVODOPA 2.5 MG: 50; 200 TABLET, EXTENDED RELEASE ORAL at 08:28

## 2022-06-08 RX ADMIN — SODIUM CHLORIDE 75 ML/HR: 900 INJECTION, SOLUTION INTRAVENOUS at 13:41

## 2022-06-08 RX ADMIN — SENNOSIDES AND DOCUSATE SODIUM 1 TABLET: 50; 8.6 TABLET ORAL at 17:42

## 2022-06-08 RX ADMIN — ACETAMINOPHEN 650 MG: 325 TABLET ORAL at 06:22

## 2022-06-08 RX ADMIN — ENOXAPARIN SODIUM 30 MG: 100 INJECTION SUBCUTANEOUS at 22:46

## 2022-06-08 RX ADMIN — HALOPERIDOL LACTATE 2 MG: 5 INJECTION, SOLUTION INTRAMUSCULAR at 14:34

## 2022-06-08 RX ADMIN — MORPHINE SULFATE 1 MG: 2 INJECTION, SOLUTION INTRAMUSCULAR; INTRAVENOUS at 06:24

## 2022-06-08 RX ADMIN — POLYETHYLENE GLYCOL 3350 17 G: 17 POWDER, FOR SOLUTION ORAL at 08:31

## 2022-06-08 RX ADMIN — ACETAMINOPHEN 1000 MG: 325 TABLET ORAL at 13:43

## 2022-06-08 RX ADMIN — PANTOPRAZOLE SODIUM 40 MG: 40 TABLET, DELAYED RELEASE ORAL at 06:22

## 2022-06-08 RX ADMIN — Medication 1 TABLET: at 08:29

## 2022-06-08 RX ADMIN — ATORVASTATIN CALCIUM 10 MG: 10 TABLET, FILM COATED ORAL at 22:34

## 2022-06-08 RX ADMIN — ACETAMINOPHEN 650 MG: 325 TABLET ORAL at 00:00

## 2022-06-08 RX ADMIN — SERTRALINE 50 MG: 50 TABLET, FILM COATED ORAL at 08:28

## 2022-06-08 RX ADMIN — SODIUM CHLORIDE, PRESERVATIVE FREE 10 ML: 5 INJECTION INTRAVENOUS at 13:48

## 2022-06-08 NOTE — PROGRESS NOTES
Transition of Care Plan: TBD: Pending medical/therapy recommendations. Lives at [facility]     RUR: 10% low     PCP F/U: Dagoberto Alcala MD     Disposition: Pending medical/therapy recommendations.     Transportation: BLS    1116: Will continue to follow for therapy evaluation for discharge recommendations to coordinate [facility].      Brittany Hernandez RN, CRM

## 2022-06-08 NOTE — PROGRESS NOTES
Problem: Pressure Injury - Risk of  Goal: *Prevention of pressure injury  Description: Document Salty Scale and appropriate interventions in the flowsheet. Outcome: Progressing Towards Goal  Note: Pressure Injury Interventions:  Sensory Interventions: Assess changes in LOC,Discuss PT/OT consult with provider,Check visual cues for pain,Keep linens dry and wrinkle-free,Float heels    Moisture Interventions: Apply protective barrier, creams and emollients,Absorbent underpads,Check for incontinence Q2 hours and as needed,Internal/External urinary devices    Activity Interventions: Pressure redistribution bed/mattress(bed type),Increase time out of bed,PT/OT evaluation    Mobility Interventions: Float heels,PT/OT evaluation,Pressure redistribution bed/mattress (bed type)    Nutrition Interventions: Document food/fluid/supplement intake    Friction and Shear Interventions: Apply protective barrier, creams and emollients,Lift sheet,Transferring/repositioning devices                Problem: Patient Education: Go to Patient Education Activity  Goal: Patient/Family Education  Outcome: Progressing Towards Goal     Problem: Falls - Risk of  Goal: *Absence of Falls  Description: Document Ana Fall Risk and appropriate interventions in the flowsheet.   Outcome: Progressing Towards Goal  Note: Fall Risk Interventions:  Mobility Interventions: Bed/chair exit alarm,Communicate number of staff needed for ambulation/transfer,Patient to call before getting OOB    Mentation Interventions: Bed/chair exit alarm,Adequate sleep, hydration, pain control    Medication Interventions: Bed/chair exit alarm,Patient to call before getting OOB    Elimination Interventions: Patient to call for help with toileting needs,Call light in reach,Toileting schedule/hourly rounds    History of Falls Interventions: Consult care management for discharge planning,Investigate reason for fall,Bed/chair exit alarm         Problem: Patient Education: Go to Patient Education Activity  Goal: Patient/Family Education  Outcome: Progressing Towards Goal     Problem: Hypertension  Goal: *Blood pressure within specified parameters  Outcome: Progressing Towards Goal  Goal: *Fluid volume balance  Outcome: Progressing Towards Goal  Goal: *Labs within defined limits  Outcome: Progressing Towards Goal     Problem: Patient Education: Go to Patient Education Activity  Goal: Patient/Family Education  Outcome: Progressing Towards Goal     Problem: Hypotension  Goal: *Blood pressure within specified parameters  Outcome: Progressing Towards Goal  Goal: *Fluid volume balance  Outcome: Progressing Towards Goal  Goal: *Labs within defined limits  Outcome: Progressing Towards Goal     Problem: Patient Education: Go to Patient Education Activity  Goal: Patient/Family Education  Outcome: Progressing Towards Goal

## 2022-06-08 NOTE — PROGRESS NOTES
Physical Therapy 6/8/22    Chart reviewed, conferred with RN. Per chart, patient has bedrest orders, waiting to clarify with MD. Patient also out for repeat CT this am and RN requesting to defer at this time. Will follow up later as able.     Thank you for your consideration,    Alvaro Goldstein, PT, DPT

## 2022-06-08 NOTE — PROGRESS NOTES
Physical Therapy 6/8/22    Chart reviewed, RN cleared to see. Bed rest orders discharged, CT scan stable. Upon entering room, patient agitated with RN and caregiver at bedside. Spent up to 10 minutes in room attempting to evaluate patient. Patient would no allow PT to touch her or agree to follow cues/commands. At one point patient began to scream out and threw a pillow at therapist. Also attempted to kick therapist. Session aborted. Will attempt to follow up as appropriate tomorrow.     Thank you for your consideration,    Jeferson Mccall, PT, DPT

## 2022-06-08 NOTE — PROGRESS NOTES
[facility] Adult  Hospitalist Group                                                                                          Hospitalist Progress Note  Osito Barros MD  Answering service: [phone] from in house phone        Date of Service:  2022  NAME:  Zoran Vann  :  1935  MRN:  [id number]      Admission Summary:   Zoran Vann is a 80 y.o. female who presents with ground level fall. Patient came from [address] [place] [address] home after reported ground-level fall EMS said that she had fall on a carpeted floor did not strike her head with no loss of consciousness she takes Plavix daily as per daughter for previous strokes. She was initially complaining of left-sided hip pain during transport she also complains of right hip pain as well denies any headache vomiting loss of consciousness patient was alert oriented and could give history as well in the ED. x-ray showing right hip fracture and CT scan of the head showing subdural hemorrhage     The patient denies any fever, chills, chest pain, cough, congestion, recent illness, palpitations, or dysuria       Interval history / Subjective:   Pt underwent hip fracture repair yesterday. Received morphine early am, and lethargic. Oriented however. Has some pain with movement, but otherwise well controlled. Denies headache. Assessment & Plan:     Fall with mildly displaced intertrochanteric right hip fracture  -Pain control with IV and oral pain medication --> schedule high dose tylenol, PRN oxycodone, and PRN IV morphine,   -s/p IM nail   - Cannot get DVT PPx due to SDH. Per previous hospitalist, daughter aware of risk.   - PT/OT today, likely benefit from SNF  - Will discuss with neurosurgery regarding prophylaxis. ..      Subdural hemorrhage  -Moderately large acute on chronic patient is on Plavix (holding)  -CT to be repeated again today   -Appreciate neurosurgery, following      History of parkinsonism/Orthostatic hypotension/Dementia  -Continue Sinemet with a complex dosing/Continue midodrine  -supportive care  -fall precautions  -discussed with the daughter in detail about potential for hospital delirium and non medical ways to prevent that, she understands    History of CVA (2/2019): On plavix (currently on hold)  GERD - PPI, carafate     Code status: FULL CODE  Prophylaxis: scd  PTA: [facility] discussed with: Patient, daughter  Anticipated Disposition: SNF vs. IPR, doubt pt can complete 3 hours of therapy at this time. More likely appropriate for [facility] Problems  Date Reviewed: 2/28/2019          Codes Class Noted POA    * (Principal) Hip fracture [facility]) ICD-10-CM: P62.175G  ICD-9-CM: 820.8  6/6/2022 Unknown                Review of Systems:   A comprehensive review of systems was negative except for that written in the HPI. Vital Signs:    Last 24hrs VS reviewed since prior progress note. Most recent are:  Visit Vitals  BP (!) 116/49   Pulse 87   Temp 98.2 °F (36.8 °C)   Resp 18   SpO2 97%         Intake/Output Summary (Last 24 hours) at 6/8/2022 0857  Last data filed at 6/8/2022 0800  Gross per 24 hour   Intake 800 ml   Output 1190 ml   Net -390 ml        Physical Examination:     I had a face to face encounter with this patient and independently examined them on 6/8/2022 as outlined below:          Constitutional:  No acute distress   ENT:  Oral mucosa moist, oropharynx benign. Periorbital swelling    Resp:  CTA bilaterally. No wheezing/rhonchi/rales. No accessory muscle use. CV:  Regular rhythm, normal rate, no murmurs, gallops, rubs    GI:  Soft, non distended, non tender. normoactive bowel sounds, no hepatosplenomegaly     Musculoskeletal:  LLE edema, incision bandaged  c/d/i  warm, 2+ pulses throughout    Neurologic:  Moves all extremities. AAOx3, CN II-XII reviewed and no focal deficits appreciated.              Data Review:    Review and/or order of clinical lab test  Review and/or order of tests in the radiology section of CPT  Review and/or order of tests in the medicine section of CPT      Labs:     Recent Labs     06/08/22 0123 06/06/22 0652   WBC  --  4.2   HGB 8.1* 12.4   HCT  --  39.6   PLT  --  230     Recent Labs     06/08/22  0123 06/06/22 0652    142   K 4.0 4.4   * 106   CO2 25 31   BUN 14 16   CREA 0.63 0.88   * 115*   CA 8.1* 9.6     No results for input(s): ALT, AP, TBIL, TBILI, TP, ALB, GLOB, GGT, AML, LPSE in the last 72 hours. No lab exists for component: SGOT, GPT, AMYP, HLPSE  Recent Labs     06/06/22 0652   INR 1.0   PTP 10.3      No results for input(s): FE, TIBC, PSAT, FERR in the last 72 hours. No results found for: FOL, RBCF   No results for input(s): PH, PCO2, PO2 in the last 72 hours. No results for input(s): CPK, CKNDX, TROIQ in the last 72 hours.     No lab exists for component: CPKMB  Lab Results   Component Value Date/Time    Cholesterol, total 142 02/26/2019 03:46 AM    HDL Cholesterol 86 02/26/2019 03:46 AM    LDL, calculated 38.8 02/26/2019 03:46 AM    Triglyceride 86 02/26/2019 03:46 AM    CHOL/HDL Ratio 1.7 02/26/2019 03:46 AM     Lab Results   Component Value Date/Time    Glucose (POC) 110 06/07/2022 04:58 PM    Glucose (POC) 97 02/23/2019 12:11 PM     Lab Results   Component Value Date/Time    Color YELLOW/STRAW 06/06/2022 09:56 AM    Appearance CLEAR 06/06/2022 09:56 AM    Specific gravity 1.010 06/06/2022 09:56 AM    Specific gravity 1.010 02/23/2019 02:03 PM    pH (UA) 7.5 06/06/2022 09:56 AM    Protein Negative 06/06/2022 09:56 AM    Glucose Negative 06/06/2022 09:56 AM    Ketone Negative 06/06/2022 09:56 AM    Bilirubin Negative 06/06/2022 09:56 AM    Urobilinogen 0.2 06/06/2022 09:56 AM    Nitrites Negative 06/06/2022 09:56 AM    Leukocyte Esterase Negative 06/06/2022 09:56 AM    Epithelial cells FEW 06/06/2022 09:56 AM    Bacteria Negative 06/06/2022 09:56 AM    WBC 0-4 06/06/2022 09:56 AM RBC 0-5 06/06/2022 09:56 AM         Medications Reviewed:     Current Facility-Administered Medications   Medication Dose Route Frequency    haloperidol lactate (HALDOL) injection 2 mg  2 mg IntraVENous Q8H PRN    0.9% sodium chloride infusion  125 mL/hr IntraVENous CONTINUOUS    sodium chloride (NS) flush 5-40 mL  5-40 mL IntraVENous Q8H    sodium chloride (NS) flush 5-40 mL  5-40 mL IntraVENous PRN    naloxone (NARCAN) injection 0.4 mg  0.4 mg IntraVENous PRN    calcium-vitamin D (OS-ROSA MARIA +D3) 500 mg-200 unit per tablet 1 Tablet  1 Tablet Oral TID WITH MEALS    senna-docusate (PERICOLACE) 8.6-50 mg per tablet 1 Tablet  1 Tablet Oral BID    polyethylene glycol (MIRALAX) packet 17 g  17 g Oral DAILY    [START ON 6/9/2022] bisacodyL (DULCOLAX) suppository 10 mg  10 mg Rectal DAILY PRN    acetaminophen (TYLENOL) tablet 650 mg  650 mg Oral Q6H    oxyCODONE IR (ROXICODONE) tablet 2.5 mg  2.5 mg Oral Q4H PRN    oxyCODONE IR (ROXICODONE) tablet 5 mg  5 mg Oral Q4H PRN    morphine injection 1 mg  1 mg IntraVENous Q4H PRN    pantoprazole (PROTONIX) tablet 40 mg  40 mg Oral ACB    0.9% sodium chloride infusion  75 mL/hr IntraVENous CONTINUOUS    sodium chloride (NS) flush 5-40 mL  5-40 mL IntraVENous Q8H    sodium chloride (NS) flush 5-40 mL  5-40 mL IntraVENous PRN    naloxone (NARCAN) injection 0.4 mg  0.4 mg IntraVENous PRN    acetaminophen (TYLENOL) tablet 650 mg  650 mg Oral Q6H PRN    morphine injection 1 mg  1 mg IntraVENous Q4H PRN    ondansetron (ZOFRAN) injection 4 mg  4 mg IntraVENous Q6H PRN    polyethylene glycol (MIRALAX) packet 17 g  17 g Oral DAILY    QUEtiapine (SEROquel) tablet 25 mg  25 mg Oral QHS    QUEtiapine (SEROquel) tablet 12.5 mg  12.5 mg Oral DAILY AFTER BREAKFAST    midodrine (PROAMATINE) tablet 2.5 mg  2.5 mg Oral DAILY AFTER BREAKFAST    donepeziL (ARICEPT) tablet 5 mg  5 mg Oral QHS    levETIRAcetam (KEPPRA) injection 500 mg  500 mg IntraVENous Q12H    sertraline (ZOLOFT) tablet 50 mg  50 mg Oral DAILY    atorvastatin (LIPITOR) tablet 10 mg  10 mg Oral QHS    carbidopa-levodopa ER (SINEMET CR)  mg per tablet 1 Tablet  1 Tablet Oral DAILY AFTER BREAKFAST    carbidopa-levodopa (SINEMET)  mg per tablet 1.5 Tablet  1.5 Tablet Oral BIDPC     ______________________________________________________________________  EXPECTED LENGTH OF STAY: 2d 14h  ACTUAL LENGTH OF STAY:          2                 Zahra Donald MD

## 2022-06-08 NOTE — ROUTINE PROCESS
Bedside and Verbal shift change report given to Jordi REDDY (oncoming nurse) by India Blair (offgoing nurse). Report included the following information SBAR, Kardex, MAR, Cardiac Rhythm NSR and Dual Neuro Assessment.

## 2022-06-08 NOTE — PROGRESS NOTES
Occupational therapy  06/08/22     Chart reviewed, conferred with RN. Per chart, patient has bedrest orders, waiting to clarify with MD. Patient also out for repeat CT this am and RN requesting to defer at this time. Will follow up later as able. 2:20-2:32 PM: Additional CT scan stable and bedrest orders removed from chart. Patient attempted again in afternoon, however, patient agitated at this time. Yelling at therapist to be left alone and refusing all mobility and self care at this time. Per caregiver present in the room, patient has baseline intermittent agitation but is cooperative at time. Session aborted due to increasing agitation and concerns for combativeness, will follow up tomorrow for additional eval attempts.     Thank you,  Blaze Enriquez OTR/L

## 2022-06-08 NOTE — PROGRESS NOTES
Orthopedic Progress Note    S: Pain well controlled resting in bed, no new complaints;Denies numbness, tingling, focal weakness, cp, sob, fever, chills     O: NAD, respirations unlabored; Dressings CDI; thigh soft, no edema, no posterior calf ttp; EHL/DF/PF 5/5, SILT; Cap refill brisk, foot warm, DP2+    Patient Vitals for the past 4 hrs:   Temp Pulse BP   06/08/22 1200 -- 78 --   06/08/22 1001 98.7 °F (37.1 °C) 78 (!) 108/43   06/08/22 1000 -- 77 --     Recent Labs     06/08/22  0123 06/06/22  0652   HGB 8.1* 12.4   HCT  --  39.6   PLT  --  230   BUN 14 16   CREA 0.63 0.88   K 4.0 4.4    142       CT HEAD WO CONT  Narrative: EXAM:  CT HEAD WO CONT    INDICATION: Subdural hemorrhages    COMPARISON: CT 6/7/2022    TECHNIQUE: Noncontrast head CT. Coronal and sagittal reformats. CT dose  reduction was achieved through use of a standardized protocol tailored for this  examination and automatic exposure control for dose modulation. Adaptive  statistical iterative reconstruction (ASIR) was utilized. FINDINGS:     There are stable acute on chronic bilateral subdural hemorrhages measuring 11 mm  in thickness on the right and 5 mm in thickness on the left. There is stable  subtle leftward midline shift. There is no acute hydrocephalus. The basal  cisterns are patent. There is stable chronic microvascular ischemic disease in the periventricular  white matter. Right posterior calvarial thickening versus osteoma is again noted. The  visualized paranasal sinuses and mastoid air cells are clear. Impression: Stable acute on chronic bilateral subdural hemorrhages. A/P:  Procedure: Procedure(s):  RIGHT IM NAIL/ HANA TABLE/ SYNTHES NAIL INTERMEDIATE  ESSENTIAL LENGTH  REQ TF  Post Op day: 1 Day Post-Op    - DVT ppx -per medicine and neurosurgery given the acute head bleed;  Would start Lovenox daily x 28 days typically; SCDs  - PT/OT - WBAT  - Pain - Acetaminophen, Oxycodone; Ice, Elevation, Ace wrap as needed  - Dressing - Leave in place if it remains dry and intact; change as needed for saturation with dry sterile island dressing  - Dispo - Pending PT/OT recs; needs f/u in 2 weeks with Dr. Ty; refer to DC instructions for further details.     JULIA Pinedo  Orthopedic Trauma Service  [facility]

## 2022-06-08 NOTE — PROGRESS NOTES
Bedside and Verbal shift change report given to [facility] (oncoming nurse) by Lilia Cotton (offgoing nurse). Report included the following information SBAR, Kardex, MAR, Cardiac Rhythm NSR and Dual Neuro Assessment.

## 2022-06-09 LAB
GLUCOSE BLD STRIP.AUTO-MCNC: 105 MG/DL (ref 65–117)
GLUCOSE BLD STRIP.AUTO-MCNC: 127 MG/DL (ref 65–117)
GLUCOSE BLD STRIP.AUTO-MCNC: 136 MG/DL (ref 65–117)
GLUCOSE BLD STRIP.AUTO-MCNC: 98 MG/DL (ref 65–117)
SERVICE CMNT-IMP: ABNORMAL
SERVICE CMNT-IMP: ABNORMAL
SERVICE CMNT-IMP: NORMAL
SERVICE CMNT-IMP: NORMAL

## 2022-06-09 PROCEDURE — 74011250636 HC RX REV CODE- 250/636: Performed by: INTERNAL MEDICINE

## 2022-06-09 PROCEDURE — 97530 THERAPEUTIC ACTIVITIES: CPT

## 2022-06-09 PROCEDURE — 74011250637 HC RX REV CODE- 250/637: Performed by: FAMILY MEDICINE

## 2022-06-09 PROCEDURE — 74011250636 HC RX REV CODE- 250/636: Performed by: HOSPITALIST

## 2022-06-09 PROCEDURE — 65270000046 HC RM TELEMETRY

## 2022-06-09 PROCEDURE — 82962 GLUCOSE BLOOD TEST: CPT

## 2022-06-09 PROCEDURE — 97166 OT EVAL MOD COMPLEX 45 MIN: CPT

## 2022-06-09 PROCEDURE — 74011250637 HC RX REV CODE- 250/637: Performed by: ORTHOPAEDIC SURGERY

## 2022-06-09 PROCEDURE — 74011250637 HC RX REV CODE- 250/637: Performed by: INTERNAL MEDICINE

## 2022-06-09 PROCEDURE — 97161 PT EVAL LOW COMPLEX 20 MIN: CPT

## 2022-06-09 PROCEDURE — 74011000250 HC RX REV CODE- 250: Performed by: ORTHOPAEDIC SURGERY

## 2022-06-09 PROCEDURE — 74011000250 HC RX REV CODE- 250: Performed by: HOSPITALIST

## 2022-06-09 PROCEDURE — 74011250637 HC RX REV CODE- 250/637: Performed by: HOSPITALIST

## 2022-06-09 RX ORDER — HALOPERIDOL 5 MG/ML
1 INJECTION INTRAMUSCULAR
Status: DISCONTINUED | OUTPATIENT
Start: 2022-06-09 | End: 2022-06-14 | Stop reason: HOSPADM

## 2022-06-09 RX ORDER — QUETIAPINE FUMARATE 25 MG/1
25 TABLET, FILM COATED ORAL EVERY EVENING
Status: DISCONTINUED | OUTPATIENT
Start: 2022-06-09 | End: 2022-06-14 | Stop reason: HOSPADM

## 2022-06-09 RX ADMIN — CARBIDOPA AND LEVODOPA 2.5 MG: 50; 200 TABLET, EXTENDED RELEASE ORAL at 09:19

## 2022-06-09 RX ADMIN — LEVETIRACETAM 500 MG: 100 INJECTION INTRAVENOUS at 09:30

## 2022-06-09 RX ADMIN — ACETAMINOPHEN 1000 MG: 325 TABLET ORAL at 13:22

## 2022-06-09 RX ADMIN — Medication 1 TABLET: at 13:22

## 2022-06-09 RX ADMIN — CARBIDOPA AND LEVODOPA 1.5 TABLET: 25; 100 TABLET ORAL at 17:58

## 2022-06-09 RX ADMIN — SENNOSIDES AND DOCUSATE SODIUM 1 TABLET: 50; 8.6 TABLET ORAL at 09:18

## 2022-06-09 RX ADMIN — CARBIDOPA AND LEVODOPA 1.5 TABLET: 25; 100 TABLET ORAL at 09:18

## 2022-06-09 RX ADMIN — LEVETIRACETAM 500 MG: 100 INJECTION INTRAVENOUS at 22:22

## 2022-06-09 RX ADMIN — QUETIAPINE FUMARATE 12.5 MG: 25 TABLET ORAL at 09:17

## 2022-06-09 RX ADMIN — CARBIDOPA AND LEVODOPA 1 TABLET: 25; 100 TABLET, EXTENDED RELEASE ORAL at 09:17

## 2022-06-09 RX ADMIN — Medication 1 TABLET: at 17:59

## 2022-06-09 RX ADMIN — HALOPERIDOL LACTATE 2 MG: 5 INJECTION, SOLUTION INTRAMUSCULAR at 01:01

## 2022-06-09 RX ADMIN — SODIUM CHLORIDE, PRESERVATIVE FREE 10 ML: 5 INJECTION INTRAVENOUS at 13:22

## 2022-06-09 RX ADMIN — ACETAMINOPHEN 1000 MG: 325 TABLET ORAL at 22:22

## 2022-06-09 RX ADMIN — SENNOSIDES AND DOCUSATE SODIUM 1 TABLET: 50; 8.6 TABLET ORAL at 17:59

## 2022-06-09 RX ADMIN — SODIUM CHLORIDE, PRESERVATIVE FREE 10 ML: 5 INJECTION INTRAVENOUS at 22:22

## 2022-06-09 RX ADMIN — POLYETHYLENE GLYCOL 3350 17 G: 17 POWDER, FOR SOLUTION ORAL at 09:20

## 2022-06-09 RX ADMIN — SODIUM CHLORIDE, PRESERVATIVE FREE 10 ML: 5 INJECTION INTRAVENOUS at 22:23

## 2022-06-09 RX ADMIN — Medication 1 TABLET: at 09:18

## 2022-06-09 RX ADMIN — ENOXAPARIN SODIUM 30 MG: 100 INJECTION SUBCUTANEOUS at 20:34

## 2022-06-09 RX ADMIN — SERTRALINE 50 MG: 50 TABLET, FILM COATED ORAL at 09:19

## 2022-06-09 RX ADMIN — SODIUM CHLORIDE 75 ML/HR: 900 INJECTION, SOLUTION INTRAVENOUS at 13:20

## 2022-06-09 RX ADMIN — QUETIAPINE FUMARATE 25 MG: 25 TABLET ORAL at 17:58

## 2022-06-09 NOTE — PROGRESS NOTES
Transition of Care Plan: SNF (referrals pending)  Lives at [facility]     RUR: 10% low     PCP F/U: Rebeka Alcala MD     Disposition: SNF     Transportation: BLS     Main Contact: Primary Decision Maker: Lurdes Linton - Son - [phone]  Secondary Decision Maker: Mahesh Sherman Child - [phone]: Discussed recommendations for SNF with patient's daughter and son. Discussed the difference between IPR and SNF. Agreeable to SNF, but would like this CM to call the [place] [phone]) to see if they can accept under their healthcare unit. Left message for liaison to return call. States that they will look at the SNF list and get back to this CM. Contact to care patrol given to family as well.      Diana Laureano RN, CRM

## 2022-06-09 NOTE — PROGRESS NOTES
Pt is alert to self and on room air. Pt was alert at the beginning of the shift but got more confused throughout the night. Pt removed her heart monitor and did not want it back on her chest.Pt became agitated and  refused most of her PO medications. Pt kept screaming that we are going to kill her. Pt received 0.4 ml of haldol. Pt slept after receiving haldol dose. However, pt refused to have her labs drawn,. This nurse passed it on to day shift. Bedside shift change report given to Galdino Aguilar RN (oncoming nurse) by Wesly Dolan RN (offgoing nurse). Report included the following information SBAR, Kardex, MAR, Cardiac Rhythm NSR and Alarm Parameters .

## 2022-06-09 NOTE — PROGRESS NOTES
Neurosurgery Progress Note  [facility]          Admit Date: 2022   LOS: 3 days        Daily Progress Note: 2022    POD:2 Days Post-Op    S/P: Procedure(s):  RIGHT IM NAIL/ HANA TABLE/ SYNTHES NAIL INTERMEDIATE  ESSENTIAL LENGTH  REQ TF    Subjective: The patient is quite sleepy today because she received 2 mg of Haldol overnight due to agitation. Her daughter reports she does sundown at home. The patient was started on Lovenox yesterday for DVT ppx. Unable to obtain ROS due to patient condition. Objective:     Vital signs  Temp (24hrs), Av.8 °F (37.1 °C), Min:98.1 °F (36.7 °C), Max:99.5 °F (37.5 °C)   No intake/output data recorded.  1901 -  0700  In: -   Out: 1175 [Urine:1175]    Visit Vitals  BP (!) 104/48 (BP 1 Location: Right upper arm, BP Patient Position: At rest)   Pulse 78   Temp 98.8 °F (37.1 °C)   Resp 15   Ht 4' 9\" (1.448 m)   Wt 49.9 kg (110 lb)   SpO2 98%   BMI 23.80 kg/m²    O2 Flow Rate (L/min): 1 l/min O2 Device: None (Room air)     Pain control  Pain Assessment  Pain Scale 1: Numeric (0 - 10)  Pain Intensity 1: 0  Pain Onset 1: post op  Pain Location 1: Head  Pain Orientation 1: Right  Pain Description 1: Aching  Pain Intervention(s) 1: Medication (see MAR)    PT/OT  Gait                 Physical Exam:  Gen:NAD. Neuro: Lethargic but awakens to sternal rub and then talks to me. Ox3. Follows commands. Speech soft. Affect flat. PERRL. EOMI. Face symmetric. ongue midline. PACHECO spontaneously  Negative drift. Gait deferred.     CT head without contrast on 22 shows stable acute on chronic bilateral subdural hemorrhages    24 hour results:    Recent Results (from the past 24 hour(s))   GLUCOSE, POC    Collection Time: 22 10:09 PM   Result Value Ref Range    Glucose (POC) 122 (H) 65 - 117 mg/dL    Performed by ANNABELLE Bacon    GLUCOSE, POC    Collection Time: 22  6:59 AM   Result Value Ref Range    Glucose (POC) 98 65 - 117 mg/dL Performed by Riri Fontenot    GLUCOSE, POC    Collection Time: 06/09/22 11:26 AM   Result Value Ref Range    Glucose (POC) 127 (H) 65 - 117 mg/dL    Performed by Caren Espinoza, POC    Collection Time: 06/09/22  4:52 PM   Result Value Ref Range    Glucose (POC) 136 (H) 65 - 117 mg/dL    Performed by Franklyn Biswas           Assessment:     Principal Problem:    Hip fracture ([address] (6/6/2022)        Plan:   1. Acute on chronic right SDH   - No surgical intervention   - Repeat head CT as an outpatient in 2 weeks with f/u appt   - Would limit Haldol usage - decrease dose if you have to use it  2. Hip fracture   - s/p surgical repair 06/07   - ok for lovenox for DVT ppx      Plan d/w Dr. Mali Alanis, daughter at bedside. Will sign off. Please call if questions.       Devan Espinoza, POLO

## 2022-06-09 NOTE — PROGRESS NOTES
Problem: Mobility Impaired (Adult and Pediatric)  Goal: *Acute Goals and Plan of Care (Insert Text)  Description: FUNCTIONAL STATUS PRIOR TO ADMISSION: Pt lives at [address] [place] [address] and has additional hired caregiver. Pt has hx of CVA, Parkinson's disease, orthostatic hypotension, and dementia. Was ambulatory with assistance and use of rollator per family. Physical Therapy Goals  Initiated 6/9/2022  1. Patient will move from supine to sit and sit to supine, scoot up and down, and roll side to side in bed with maximal assistance within 7 day(s). 2.  Patient will transfer from bed to chair and chair to bed with maximal assistance using the least restrictive device within 7 day(s). 3.  Patient will perform sit to stand with maximal assistance within 7 day(s). 4.  Patient will ambulate with maximal assistance for 5 feet with the least restrictive device within 7 day(s). Outcome: Not Met  PHYSICAL THERAPY EVALUATION  Patient: Tobias Gerardo (80 y.o. female)  Date: 6/9/2022  Primary Diagnosis: Hip fracture ([facility] [address] [S72.009A]  Procedure(s) (LRB):  RIGHT IM NAIL/ HANA TABLE/ SYNTHES NAIL INTERMEDIATE  ESSENTIAL LENGTH  REQ TF (Right) 2 Days Post-Op   Precautions: Fall,WBAT    ASSESSMENT  Based on the objective data described below, the patient presents with drowsiness, minimal command following, RLE pain, impaired balance, poor activity tolerance, and overall decline in functional mobility s/p admission with GLF resulting in R hip fx (POD 2 s/p R IM nailing) and bilateral SDH. Pt unable to provide PLOF. Per family and chart, pt  lives at [address] [place] [address] and has additional hired caregiver. Pt has hx of CVA, Parkinson's disease, orthostatic hypotension, and dementia. Was ambulatory with assistance and use of rollator. This date, pt required sternal rub to open eyes. Transferred supine<>sit with totalA x2.   Pt with fair-poor sitting balance and required assistance to scoot forward and place feet on floor.  Transferred sit<>stand with maxA x2 and use of RW. Pt able to tolerate partial stand only. Returned to bed and left with all needs met. Recommending SNF upon discharge - note family prefers IPR however pt unable to tolerate 3 hours of skilled therapy at this time. Current Level of Function Impacting Discharge (mobility/balance): totalA x2 for supine<>sit transfer, maxA x2 for partial stand    Functional Outcome Measure: The patient scored 2/56 on the Jc outcome measure which is indicative of high fall risk. Other factors to consider for discharge: drowsy, PMH (dementia, parkinson's, CVA), from shelter with hired caregivers     Patient will benefit from skilled therapy intervention to address the above noted impairments. PLAN :  Recommendations and Planned Interventions: bed mobility training, transfer training, gait training, therapeutic exercises, neuromuscular re-education, patient and family training/education and therapeutic activities      Frequency/Duration: Patient will be followed by physical therapy:  3 times a week to address goals. Recommendation for discharge: (in order for the patient to meet his/her long term goals)  Therapy up to 5 days/week in SNF setting  Note family prefers IPR however pt unable to tolerate 3 hours of skilled therapy at this time. This discharge recommendation:  Has been made in collaboration with the attending provider and/or case management    IF patient discharges home will need the following DME: patient owns DME required for discharge         SUBJECTIVE:   Patient stated no.     OBJECTIVE DATA SUMMARY:   HISTORY:    Past Medical History:   Diagnosis Date    GERD (gastroesophageal reflux disease)     [facility] failure ([facility] [address]     Hypertension      Past Surgical History:   Procedure Laterality Date    HX GYN      hysterectomy    HX HEENT      catacacts removed    [place] BREAST SURGERY PROCEDURE UNLISTED      l breast 2 cyst       Home Situation  Home Environment: [address] [place] [address] Road Name: Angelo Palacios  Living Alone: No (caregiver from [phone])  Support Systems: Child(tootie),Caregiver/Home Care Staff,Assisted Living  Patient Expects to be Discharged to[de-identified] Other: (dtr wants IPR)  Current DME Used/Available at Home: Pallavi Gutiérrez, hosea    EXAMINATION/PRESENTATION/DECISION MAKING:   Critical Behavior:  Neurologic State: Ferdie Nathan  Orientation Level: Oriented to person,Disoriented to place,Disoriented to situation,Disoriented to time  Cognition: Decreased attention/concentration,Poor safety awareness  Safety/Judgement: Decreased insight into deficits,Decreased awareness of need for safety,Decreased awareness of need for assistance  Hearing: Auditory  Auditory Impairment: Hard of hearing, bilateral    Range Of Motion:  AROM: Grossly decreased, non-functional                       Strength:    Strength: Grossly decreased, non-functional                    Tone & Sensation:   Tone: Normal (noted hx of Parkinsons)                              Coordination:     Vision:   Acuity:  (unable to assess)  Functional Mobility:  Bed Mobility:  Rolling: Total assistance  Supine to Sit: Total assistance;Assist x2  Sit to Supine: Total assistance;Assist x2  Scooting: Total assistance     Transfers:  Sit to Stand: Maximum assistance;Assist x2  Stand to Sit: Maximum assistance;Assist x2    Balance:   Sitting: Impaired; With support  Sitting - Static: Fair (occasional)  Sitting - Dynamic: Poor (constant support)  Standing: Impaired; With support  Standing - Static: Poor;Constant support  Standing - Dynamic : Other (comment) (not tested)    Functional Measure:  Jc Balance Test:    Sitting to Standin  Standing Unsupported: 0  Sitting with Back Unsupported: 2  Standing to Sittin  Transfers: 0  Standing Unsupported with Eyes Closed: 0  Standing Unsupported with Feet Together: 0  Reach Forward with Outstretched Arm: 0   Object: 0  Turn to Look Over Shoulders: 0  Turn 360 Degrees: 0  Alternate Foot on Step/Stool: 0  Standing Unsupported One Foot in Front: 0  Stand on One Le  Total: 2/         56=Maximum possible score;   0-20=High fall risk  21-40=Moderate fall risk   41-56=Low fall risk      Physical Therapy Evaluation Charge Determination   History Examination Presentation Decision-Making   HIGH Complexity :3+ comorbidities / personal factors will impact the outcome/ POC  HIGH Complexity : 4+ Standardized tests and measures addressing body structure, function, activity limitation and / or participation in recreation  LOW Complexity : Stable, uncomplicated  Other outcome measures Jc  HIGH       Based on the above components, the patient evaluation is determined to be of the following complexity level: LOW     Activity Tolerance:   Poor    After treatment patient left in no apparent distress:   Supine in bed, Call bell within reach, Bed / chair alarm activated, Caregiver / family present and Side rails x 3    COMMUNICATION/EDUCATION:   The patients plan of care was discussed with: Occupational therapist and Registered nurse. Patient is unable to participate in goal setting and plan of care.     Thank you for this referral.  Katlin Doshi, PT, DPT   Time Calculation: 23 mins

## 2022-06-09 NOTE — PROGRESS NOTES
Problem: Self Care Deficits Care Plan (Adult)  Goal: *Acute Goals and Plan of Care (Insert Text)  Description: FUNCTIONAL STATUS PRIOR TO ADMISSION: Patient required maximum assistance for basic and instrumental ADLs. Per daughter and caregiver, patient requires mod to max A for all ADLs and total A for IADLs. Patient required assist for functional mobility/transfers using a rollator. HOME SUPPORT: Patient stayed at [address] [place] [address], has a private caregiver from [facility] and has assist PRN from [facility] staff remainder of day. Reported by daughter as patient unable to answer questions. Occupational Therapy Goals  Initiated 6/9/2022  1. Patient will perform lower body dressing with maximal assistance using AD PRN within 7 day(s). 2.  Patient will perform grooming EOB with minimal assistance/contact guard assist within 7 day(s). 3.  Patient will perform anterior bathing from neck to thighs with moderate assistance  within 7 day(s). 4.  Patient will perform toilet transfers with moderate assistance within 7 day(s). 5.  Patient will perform all aspects of toileting with maximal assistance within 7 day(s). 6.  Patient will participate in upper extremity therapeutic exercise/activities with minimal assistance/contact guard assist for 5 minutes within 7 day(s).         Outcome: Not Met   OCCUPATIONAL THERAPY EVALUATION  Patient: Felicita Riggs (89 y.o. female)  Date: 6/9/2022  Primary Diagnosis: Hip fracture ([facility] [address] [S72.009A]  Procedure(s) (LRB):  RIGHT IM NAIL/ HANA TABLE/ SYNTHES NAIL INTERMEDIATE  ESSENTIAL LENGTH  REQ TF (Right) 2 Days Post-Op   Precautions: Fall,WBAT    ASSESSMENT  Based on the objective data described below, the patient presents with history of dementia/Parkinson's, impaired activity tolerance, decreased generalized strength, complaints of pain with mobility in right leg, decreased command following, confusion which daughter reports is altered from baseline, and requiring increased assist for self care and functional mobility/transfers. Patient had a GLF at her correction facility (per daughter getting up to the bathroom in the middle of the night by herself) and was found to have a R hip fracture. Patient underwent a R IM nail performed by Dr. Renita Stallings 6/07/2022 and patient is currently WBAT. CT of head showing subdural hemorrhage and neurosurgery reports no intervention required at this time, CT scan shows hemorrhage to be stable following surgery for hip fracture. Patient's daughter and primary caregiver present during session, supportive. Patient transferred to edge of bed, did not initiate during transfer and resistive due to pain during mobility. Once sitting edge of bed, able to correct balance for SBA but initially mod A. Patient requiring total A for lower extremity dressing to don socks edge of bed, per daughter, patient requires total A for lower body ADLs at baseline. Patient stood at edge of bed with RW, unable to reach full standing and requiring significant physical assistance of two people for partial stand. Patient reporting increased pain in standing and requesting to return to supine following transfer, unsafe for side steps at this time. Overall patient did poor with session with limited participation and initiation for mobility. Patient reported to have significant deficits in baseline participation in activities of daily living. Patient would benefit from skilled OT services during admission to improve independence with self care and functional mobility/transfers. Recommend discharge to SNF at this time due to poor activity tolerance and requiring assist for all ADLs and mobility at baseline. Current Level of Function Impacting Discharge (ADLs/self-care): max to total A x2 for mobility/transfers, max to total A for all self care tasks    Functional Outcome Measure:   The patient scored Total: 5/100 on the Barthel Index outcome measure which is indicative of being total dependence in basic self-care. Other factors to consider for discharge: prior level of function requires assist, private caregiver during day at baseline     Patient will benefit from skilled therapy intervention to address the above noted impairments. PLAN :  Recommendations and Planned Interventions: self care training, functional mobility training, therapeutic exercise, balance training, therapeutic activities, cognitive retraining, endurance activities, patient education, home safety training and family training/education    Frequency/Duration: Patient will be followed by occupational therapy 5 times a week to address goals. Recommendation for discharge: (in order for the patient to meet his/her long term goals)  Therapy up to 5 days/week in SNF setting    This discharge recommendation:  Has been made in collaboration with the attending provider and/or case management    IF patient discharges home will need the following DME: TBD pending progress with therapy, unsafe for discharge back to [facility] at this time       SUBJECTIVE:   Patient stated No no, I want to lay down.  After sit <> stand    OBJECTIVE DATA SUMMARY:   HISTORY:   Past Medical History:   Diagnosis Date    GERD (gastroesophageal reflux disease)     Heart failure ([facility] [address]     Hypertension      Past Surgical History:   Procedure Laterality Date    HX GYN      hysterectomy    HX HEENT      catacacts removed    [place] BREAST SURGERY PROCEDURE UNLISTED      l breast 2 cyst       Expanded or extensive additional review of patient history:     Home Situation  Home Environment: Assisted living  [address] [facility] [address] [facility] Name: Boni Phillips  Living Alone: No (caregiver from [phone])  Support Systems: Child(tootie),Caregiver/Home Care Staff,Assisted Living  Patient Expects to be Discharged to[de-identified] Other: (dtr wants IPR)  Current DME Used/Available at Home: heydi Alvarezator    Hand dominance: Right    EXAMINATION OF PERFORMANCE DEFICITS:  Cognitive/Behavioral Status:  Neurologic State: Drowsy; Lethargic  Orientation Level: Oriented to person;Disoriented to place; Disoriented to situation;Disoriented to time  Cognition: Decreased attention/concentration;Poor safety awareness        Safety/Judgement: Decreased insight into deficits; Decreased awareness of need for safety;Decreased awareness of need for assistance    Skin: intact where visible    Edema: none noted    Hearing: Auditory  Auditory Impairment: Hard of hearing, bilateral    Vision/Perceptual:                           Acuity:  (unable to assess)         Range of Motion:  AROM: Grossly decreased, non-functional                         Strength:  Strength: Grossly decreased, non-functional                Coordination: Tone & Sensation:  Tone: Normal (noted hx of Parkinsons)                         Balance:  Sitting: Impaired; With support  Sitting - Static: Good (unsupported)  Sitting - Dynamic: Fair (occasional)  Standing: Impaired; With support  Standing - Static: Poor;Constant support  Standing - Dynamic : Other (comment) (not tested)    Functional Mobility and Transfers for ADLs:  Bed Mobility:  Rolling: Total assistance  Supine to Sit: Total assistance;Assist x2  Sit to Supine: Maximum assistance;Assist x2  Scooting: Total assistance    Transfers:  Sit to Stand: Maximum assistance;Assist x2  Stand to Sit: Maximum assistance;Assist x2  Toilet Transfer : Total assistance (would require sridevi to [facility])  Assistive Device : Gait Belt;Walker, rolling    ADL Assessment:  Feeding: Total assistance (infer from lethargy and functional reach)    Oral Facial Hygiene/Grooming: Maximum assistance (infer from lethargy and functional reach)    Bathing: Total assistance (infer from functional reach and LE access)    Upper Body Dressing: Maximum assistance (infer from functional reach)    Lower Body Dressing: Total assistance (requires assist at baseline)    Toileting:  Total assistance (infer bed level) ADL Intervention and task modifications:                           Lower Body Dressing Assistance  Socks: Total assistance (dependent)  Leg Crossed Method Used: No  Position Performed: Seated edge of bed         Cognitive Retraining  Safety/Judgement: Decreased insight into deficits; Decreased awareness of need for safety;Decreased awareness of need for assistance     Functional Measure:    Barthel Index:  Bathin  Bladder: 0  Bowels: 0  Groomin  Dressin  Feedin  Mobility: 0  Stairs: 0  Toilet Use: 0  Transfer (Bed to Chair and Back): 5  Total: 5/100      The Barthel ADL Index: Guidelines  1. The index should be used as a record of what a patient does, not as a record of what a patient could do. 2. The main aim is to establish degree of independence from any help, physical or verbal, however minor and for whatever reason. 3. The need for supervision renders the patient not independent. 4. A patient's performance should be established using the best available evidence. Asking the patient, friends/relatives and nurses are the usual sources, but direct observation and common sense are also important. However direct testing is not needed. 5. Usually the patient's performance over the preceding 24-48 hours is important, but occasionally longer periods will be relevant. 6. Middle categories imply that the patient supplies over 50 per cent of the effort. 7. Use of aids to be independent is allowed. Score Interpretation (from [address] [address])    Independent   60-79 Minimally independent   40-59 Partially dependent   20-39 Very dependent   <20 Totally dependent     -Topher Umana., Barthel, D.W. (1965). Functional evaluation: the Barthel Index. [facility] ([address] (1997). The Barthel activities of daily living index: self-reporting versus actual performance in the old (> or = 75 years).  Journal of [address] [place] [address] 45(7), [facility], JROBSON.F, Zoran Abdullahi., Gabriel Mccray., Edu Stubbs (1999). Measuring the change in disability after inpatient rehabilitation; comparison of the responsiveness of the Barthel Index and Functional Vickery Measure. Journal of Neurology, Neurosurgery, and Psychiatry, 66(4), 563-284. WENDY Olson, EVANGELINA Hogue, & Cherri Ellington M.A. (2004) Assessment of post-stroke quality of life in cost-effectiveness studies: The usefulness of the Barthel Index and the EuroQoL-5D. Quality of Life Research, 15, 562-74     Occupational Therapy Evaluation Charge Determination   History Examination Decision-Making   MEDIUM Complexity : Expanded review of history including physical, cognitive and psychosocial  history  HIGH Complexity : 5 or more performance deficits relating to physical, cognitive , or psychosocial skils that result in activity limitations and / or participation restrictions MEDIUM Complexity : Patient may present with comorbidities that affect occupational performnce. Miniml to moderate modification of tasks or assistance (eg, physical or verbal ) with assesment(s) is necessary to enable patient to complete evaluation       Based on the above components, the patient evaluation is determined to be of the following complexity level: MEDIUM  Pain Rating:  Patient reporting pain in right lower extremity with all mobility, did not rate    Activity Tolerance:   Poor and requires frequent rest breaks    After treatment patient left in no apparent distress:    Supine in bed, Call bell within reach, Bed / chair alarm activated, Caregiver / family present and Side rails x 3    COMMUNICATION/EDUCATION:   The patients plan of care was discussed with: Physical therapist, Registered nurse and Case management. Patient/family have participated as able in goal setting and plan of care. and Patient/family agree to work toward stated goals and plan of care. Family participating in goal setting as patient unable.     This patients plan of care is appropriate for delegation to TR[facility].     Thank you for this referral.  Dina Tsang, OTR/L  Time Calculation: 21 mins

## 2022-06-09 NOTE — PROGRESS NOTES
[facility] Adult  Hospitalist Group                                                                                          Hospitalist Progress Note  Fatoumata Tian MD  Answering service: [phone] from in house phone        Date of Service:  2022  NAME:  Gabby Covarrubias  :  1935  MRN:  [id number]      Admission Summary:     Patient presents with s/p fall sustaining right intertrochanteric fracture. S/p ORIF. Also noted subdural hemorrhage. Neurosurgery following. Repeat head CT shows stable hemorrhage. Lovenox has been restarted since. Patient working with PT OT. Interval history / Subjective:     Patient with occasional agitations and needing as needed Haldol. Noted that patient has been refusing to take her scheduled Seroquel. Today, patient looking sleepy the whole day since the morning.      Assessment & Plan:     Traumatic right hip fracture  -Patient with mechanical fall sustaining displaced right intertrochanteric fracture  -S/p ORIF 2022, working with PT OT  -DVT prophylaxis resumed with Lovenox after neurosurgery approval  -As needed pain management with oxycodone and morphine  -Patient taking p.o., DC IV fluid    Subdural hemorrhage  -Subdural hemorrhage after mechanical fall, initial CT with moderate to large acute on chronic right frontal parietal subdural hematoma and small acute on chronic left frontoparietal subdural hematoma  -Follow-up serial head CTs with the last CT head 2022 shows stable findings  -Continue Keppra for seizure prophylaxis  -Neurosurgery following    History of parkinsonism  -Continue Sinemet  -Continue midodrine for orthostatic hypotension    Delirium  -Hospital delirium, Haldol as needed, dose reduced to 1 mg due to extreme drowsiness after early a.m. dose  -Patient has been refusing nighttime Seroquel, will give Seroquel earlier with her supper  -Reorient, having family member's presents helps, back to family is surrounding SNF medically stable    History of CVA  -Patient with history of CVA in February 2019  -Plavix currently on hold due to SDH  -Continue statin    GERD  -Continue PPI    Dementia  -Continue Aricept, continue Seroquel     Code status:  Full  Prophylaxis: Lovenox  Care Plan discussed with: Patient and patient's daughter present at bedside  Anticipated Disposition: Likely more than 50 hours     Hospital Problems  Date Reviewed: 2/28/2019          Codes Class Noted POA    * (Principal) Hip fracture ([facility] [address] ICD-10-CM: D60.491Z  ICD-9-CM: 820.8  6/6/2022 Unknown                Review of Systems:   A comprehensive review of systems was negative except for that written in the HPI. Vital Signs:    Last 24hrs VS reviewed since prior progress note. Most recent are:  Visit Vitals  BP (!) 104/48 (BP 1 Location: Right upper arm, BP Patient Position: At rest)   Pulse 78   Temp 98.8 °F (37.1 °C)   Resp 15   Ht 4' 9\" (1.448 m)   Wt 49.9 kg (110 lb)   SpO2 98%   BMI 23.80 kg/m²       No intake or output data in the 24 hours ending 06/09/22 1630     Physical Examination:     I had a face to face encounter with this patient and independently examined them on 6/9/2022 as outlined below:          Constitutional:  No acute distress, somewhat drowsy   ENT:  Oral mucosa moist, oropharynx benign. Resp:  CTA bilaterally. No wheezing/rhonchi/rales. No accessory muscle use. CV:  Regular rhythm, normal rate, no murmurs, gallops, rubs    GI:  Soft, non distended, non tender. normoactive bowel sounds, no hepatosplenomegaly     Musculoskeletal:  Mild edema, warm, 2+ pulses throughout    Neurologic:  Moves all extremities.   Somewhat drowsy            Data Review:    Review and/or order of clinical lab test      Labs:     Recent Labs     06/08/22  0123   HGB 8.1*     Recent Labs     06/08/22  0123      K 4.0   *   CO2 25   BUN 14   CREA 0.63   *   CA 8.1*     No results for input(s): ALT, AP, TBIL, TBILI, TP, ALB, GLOB, GGT, AML, LPSE in the last 72 hours. No lab exists for component: SGOT, GPT, AMYP, HLPSE  No results for input(s): INR, PTP, APTT, INREXT in the last 72 hours. No results for input(s): FE, TIBC, PSAT, FERR in the last 72 hours. No results found for: FOL, RBCF   No results for input(s): PH, PCO2, PO2 in the last 72 hours. No results for input(s): CPK, CKNDX, TROIQ in the last 72 hours.     No lab exists for component: CPKMB  Lab Results   Component Value Date/Time    Cholesterol, total 142 02/26/2019 03:46 AM    HDL Cholesterol 86 02/26/2019 03:46 AM    LDL, calculated 38.8 02/26/2019 03:46 AM    Triglyceride 86 02/26/2019 03:46 AM    CHOL/HDL Ratio 1.7 02/26/2019 03:46 AM     Lab Results   Component Value Date/Time    Glucose (POC) 127 (H) 06/09/2022 11:26 AM    Glucose (POC) 98 06/09/2022 06:59 AM    Glucose (POC) 122 (H) 06/08/2022 10:09 PM    Glucose (POC) 110 06/07/2022 04:58 PM    Glucose (POC) 97 02/23/2019 12:11 PM     Lab Results   Component Value Date/Time    Color YELLOW/STRAW 06/06/2022 09:56 AM    Appearance CLEAR 06/06/2022 09:56 AM    Specific gravity 1.010 06/06/2022 09:56 AM    Specific gravity 1.010 02/23/2019 02:03 PM    pH (UA) 7.5 06/06/2022 09:56 AM    Protein Negative 06/06/2022 09:56 AM    Glucose Negative 06/06/2022 09:56 AM    Ketone Negative 06/06/2022 09:56 AM    Bilirubin Negative 06/06/2022 09:56 AM    Urobilinogen 0.2 06/06/2022 09:56 AM    Nitrites Negative 06/06/2022 09:56 AM    Leukocyte Esterase Negative 06/06/2022 09:56 AM    Epithelial cells FEW 06/06/2022 09:56 AM    Bacteria Negative 06/06/2022 09:56 AM    WBC 0-4 06/06/2022 09:56 AM    RBC 0-5 06/06/2022 09:56 AM         Medications Reviewed:     Current Facility-Administered Medications   Medication Dose Route Frequency    QUEtiapine (SEROquel) tablet 25 mg  25 mg Oral QPM    acetaminophen (TYLENOL) tablet 1,000 mg  1,000 mg Oral Q8H    enoxaparin (LOVENOX) injection 30 mg  30 mg SubCUTAneous Q24H    haloperidol lactate (HALDOL) injection 2 mg  2 mg IntraVENous Q8H PRN    sodium chloride (NS) flush 5-40 mL  5-40 mL IntraVENous Q8H    sodium chloride (NS) flush 5-40 mL  5-40 mL IntraVENous PRN    naloxone (NARCAN) injection 0.4 mg  0.4 mg IntraVENous PRN    calcium-vitamin D (OS-ROSA MARIA +D3) 500 mg-200 unit per tablet 1 Tablet  1 Tablet Oral TID WITH MEALS    senna-docusate (PERICOLACE) 8.6-50 mg per tablet 1 Tablet  1 Tablet Oral BID    bisacodyL (DULCOLAX) suppository 10 mg  10 mg Rectal DAILY PRN    oxyCODONE IR (ROXICODONE) tablet 2.5 mg  2.5 mg Oral Q4H PRN    oxyCODONE IR (ROXICODONE) tablet 5 mg  5 mg Oral Q4H PRN    pantoprazole (PROTONIX) tablet 40 mg  40 mg Oral ACB    sodium chloride (NS) flush 5-40 mL  5-40 mL IntraVENous Q8H    sodium chloride (NS) flush 5-40 mL  5-40 mL IntraVENous PRN    acetaminophen (TYLENOL) tablet 650 mg  650 mg Oral Q6H PRN    morphine injection 1 mg  1 mg IntraVENous Q4H PRN    ondansetron (ZOFRAN) injection 4 mg  4 mg IntraVENous Q6H PRN    polyethylene glycol (MIRALAX) packet 17 g  17 g Oral DAILY    QUEtiapine (SEROquel) tablet 12.5 mg  12.5 mg Oral DAILY AFTER BREAKFAST    midodrine (PROAMATINE) tablet 2.5 mg  2.5 mg Oral DAILY AFTER BREAKFAST    donepeziL (ARICEPT) tablet 5 mg  5 mg Oral QHS    levETIRAcetam (KEPPRA) injection 500 mg  500 mg IntraVENous Q12H    sertraline (ZOLOFT) tablet 50 mg  50 mg Oral DAILY    atorvastatin (LIPITOR) tablet 10 mg  10 mg Oral QHS    carbidopa-levodopa ER (SINEMET CR)  mg per tablet 1 Tablet  1 Tablet Oral DAILY AFTER BREAKFAST    carbidopa-levodopa (SINEMET)  mg per tablet 1.5 Tablet  1.5 Tablet Oral BIDPC     ______________________________________________________________________  EXPECTED LENGTH OF STAY: 2d 14h  ACTUAL LENGTH OF STAY:          3                 Sveta Talavera MD

## 2022-06-10 ENCOUNTER — TRANSCRIBE ORDER (OUTPATIENT)
Dept: SCHEDULING | Age: 87
End: 2022-06-10

## 2022-06-10 DIAGNOSIS — S06.5XAA SDH (SUBDURAL HEMATOMA): Primary | ICD-10-CM

## 2022-06-10 LAB
ANION GAP SERPL CALC-SCNC: 5 MMOL/L (ref 5–15)
BASOPHILS # BLD: 0 K/UL (ref 0–0.1)
BASOPHILS NFR BLD: 1 % (ref 0–1)
BUN SERPL-MCNC: 12 MG/DL (ref 6–20)
BUN/CREAT SERPL: 24 (ref 12–20)
CALCIUM SERPL-MCNC: 8.4 MG/DL (ref 8.5–10.1)
CHLORIDE SERPL-SCNC: 111 MMOL/L (ref 97–108)
CO2 SERPL-SCNC: 27 MMOL/L (ref 21–32)
CREAT SERPL-MCNC: 0.51 MG/DL (ref 0.55–1.02)
DIFFERENTIAL METHOD BLD: ABNORMAL
EOSINOPHIL # BLD: 0.1 K/UL (ref 0–0.4)
EOSINOPHIL NFR BLD: 3 % (ref 0–7)
ERYTHROCYTE [DISTWIDTH] IN BLOOD BY AUTOMATED COUNT: 15.6 % (ref 11.5–14.5)
GLUCOSE BLD STRIP.AUTO-MCNC: 98 MG/DL (ref 65–117)
GLUCOSE SERPL-MCNC: 86 MG/DL (ref 65–100)
HCT VFR BLD AUTO: 22.6 % (ref 35–47)
HGB BLD-MCNC: 7.2 G/DL (ref 11.5–16)
IMM GRANULOCYTES # BLD AUTO: 0 K/UL (ref 0–0.04)
IMM GRANULOCYTES NFR BLD AUTO: 0 % (ref 0–0.5)
LYMPHOCYTES # BLD: 1.1 K/UL (ref 0.8–3.5)
LYMPHOCYTES NFR BLD: 22 % (ref 12–49)
MCH RBC QN AUTO: 28.9 PG (ref 26–34)
MCHC RBC AUTO-ENTMCNC: 31.9 G/DL (ref 30–36.5)
MCV RBC AUTO: 90.8 FL (ref 80–99)
MONOCYTES # BLD: 0.5 K/UL (ref 0–1)
MONOCYTES NFR BLD: 11 % (ref 5–13)
NEUTS SEG # BLD: 3.1 K/UL (ref 1.8–8)
NEUTS SEG NFR BLD: 63 % (ref 32–75)
NRBC # BLD: 0 K/UL (ref 0–0.01)
NRBC BLD-RTO: 0 PER 100 WBC
PLATELET # BLD AUTO: 149 K/UL (ref 150–400)
PMV BLD AUTO: 9.2 FL (ref 8.9–12.9)
POTASSIUM SERPL-SCNC: 3.8 MMOL/L (ref 3.5–5.1)
RBC # BLD AUTO: 2.49 M/UL (ref 3.8–5.2)
SERVICE CMNT-IMP: NORMAL
SODIUM SERPL-SCNC: 143 MMOL/L (ref 136–145)
WBC # BLD AUTO: 4.9 K/UL (ref 3.6–11)

## 2022-06-10 PROCEDURE — 74011250636 HC RX REV CODE- 250/636: Performed by: INTERNAL MEDICINE

## 2022-06-10 PROCEDURE — 74011000250 HC RX REV CODE- 250: Performed by: HOSPITALIST

## 2022-06-10 PROCEDURE — 97530 THERAPEUTIC ACTIVITIES: CPT

## 2022-06-10 PROCEDURE — 74011250637 HC RX REV CODE- 250/637: Performed by: HOSPITALIST

## 2022-06-10 PROCEDURE — 85025 COMPLETE CBC W/AUTO DIFF WBC: CPT

## 2022-06-10 PROCEDURE — 74011250637 HC RX REV CODE- 250/637: Performed by: FAMILY MEDICINE

## 2022-06-10 PROCEDURE — 74011000250 HC RX REV CODE- 250: Performed by: ORTHOPAEDIC SURGERY

## 2022-06-10 PROCEDURE — 80048 BASIC METABOLIC PNL TOTAL CA: CPT

## 2022-06-10 PROCEDURE — 74011250636 HC RX REV CODE- 250/636: Performed by: HOSPITALIST

## 2022-06-10 PROCEDURE — 65270000046 HC RM TELEMETRY

## 2022-06-10 PROCEDURE — 74011250637 HC RX REV CODE- 250/637: Performed by: ORTHOPAEDIC SURGERY

## 2022-06-10 PROCEDURE — 74011250637 HC RX REV CODE- 250/637: Performed by: INTERNAL MEDICINE

## 2022-06-10 PROCEDURE — 82962 GLUCOSE BLOOD TEST: CPT

## 2022-06-10 PROCEDURE — 97535 SELF CARE MNGMENT TRAINING: CPT

## 2022-06-10 PROCEDURE — 36415 COLL VENOUS BLD VENIPUNCTURE: CPT

## 2022-06-10 RX ADMIN — CARBIDOPA AND LEVODOPA 1.5 TABLET: 25; 100 TABLET ORAL at 18:02

## 2022-06-10 RX ADMIN — SODIUM CHLORIDE, PRESERVATIVE FREE 10 ML: 5 INJECTION INTRAVENOUS at 05:07

## 2022-06-10 RX ADMIN — Medication 1 TABLET: at 09:04

## 2022-06-10 RX ADMIN — CARBIDOPA AND LEVODOPA 1 TABLET: 25; 100 TABLET, EXTENDED RELEASE ORAL at 09:04

## 2022-06-10 RX ADMIN — ATORVASTATIN CALCIUM 10 MG: 10 TABLET, FILM COATED ORAL at 22:38

## 2022-06-10 RX ADMIN — LEVETIRACETAM 500 MG: 100 INJECTION INTRAVENOUS at 22:38

## 2022-06-10 RX ADMIN — ENOXAPARIN SODIUM 30 MG: 100 INJECTION SUBCUTANEOUS at 20:00

## 2022-06-10 RX ADMIN — SODIUM CHLORIDE, PRESERVATIVE FREE 5 ML: 5 INJECTION INTRAVENOUS at 22:00

## 2022-06-10 RX ADMIN — POLYETHYLENE GLYCOL 3350 17 G: 17 POWDER, FOR SOLUTION ORAL at 09:08

## 2022-06-10 RX ADMIN — CARBIDOPA AND LEVODOPA 2.5 MG: 50; 200 TABLET, EXTENDED RELEASE ORAL at 09:03

## 2022-06-10 RX ADMIN — SERTRALINE 50 MG: 50 TABLET, FILM COATED ORAL at 09:04

## 2022-06-10 RX ADMIN — DONEPEZIL HYDROCHLORIDE 5 MG: 5 TABLET, FILM COATED ORAL at 22:38

## 2022-06-10 RX ADMIN — OXYCODONE 5 MG: 5 TABLET ORAL at 22:39

## 2022-06-10 RX ADMIN — LEVETIRACETAM 500 MG: 100 INJECTION INTRAVENOUS at 09:08

## 2022-06-10 RX ADMIN — SENNOSIDES AND DOCUSATE SODIUM 1 TABLET: 50; 8.6 TABLET ORAL at 09:04

## 2022-06-10 RX ADMIN — SODIUM PHOSPHATE, DIBASIC AND SODIUM PHOSPHATE, MONOBASIC 1 ENEMA: 7; 19 ENEMA RECTAL at 16:20

## 2022-06-10 RX ADMIN — Medication 1 TABLET: at 18:02

## 2022-06-10 RX ADMIN — SODIUM CHLORIDE, PRESERVATIVE FREE 10 ML: 5 INJECTION INTRAVENOUS at 13:31

## 2022-06-10 RX ADMIN — CARBIDOPA AND LEVODOPA 1.5 TABLET: 25; 100 TABLET ORAL at 09:04

## 2022-06-10 RX ADMIN — ACETAMINOPHEN 1000 MG: 325 TABLET ORAL at 22:38

## 2022-06-10 RX ADMIN — QUETIAPINE FUMARATE 25 MG: 25 TABLET ORAL at 18:02

## 2022-06-10 RX ADMIN — QUETIAPINE FUMARATE 12.5 MG: 25 TABLET ORAL at 09:19

## 2022-06-10 RX ADMIN — SENNOSIDES AND DOCUSATE SODIUM 1 TABLET: 50; 8.6 TABLET ORAL at 18:02

## 2022-06-10 RX ADMIN — ACETAMINOPHEN 1000 MG: 325 TABLET ORAL at 07:05

## 2022-06-10 RX ADMIN — ACETAMINOPHEN 1000 MG: 325 TABLET ORAL at 13:30

## 2022-06-10 RX ADMIN — Medication 1 TABLET: at 13:29

## 2022-06-10 RX ADMIN — PANTOPRAZOLE SODIUM 40 MG: 40 TABLET, DELAYED RELEASE ORAL at 07:05

## 2022-06-10 NOTE — PROGRESS NOTES
Problem: Self Care Deficits Care Plan (Adult)  Goal: *Acute Goals and Plan of Care (Insert Text)  Description: FUNCTIONAL STATUS PRIOR TO ADMISSION: Patient required maximum assistance for basic and instrumental ADLs. Per daughter and caregiver, patient requires mod to max A for all ADLs and total A for IADLs. Patient required assist for functional mobility/transfers using a rollator. HOME SUPPORT: Patient stayed at [address] [place] [address], has a private caregiver from [facility] and has assist PRN from [facility] staff remainder of day. Reported by daughter as patient unable to answer questions. Occupational Therapy Goals  Initiated 6/9/2022  1. Patient will perform lower body dressing with maximal assistance using AD PRN within 7 day(s). 2.  Patient will perform grooming EOB with minimal assistance/contact guard assist within 7 day(s). 3.  Patient will perform anterior bathing from neck to thighs with moderate assistance  within 7 day(s). 4.  Patient will perform toilet transfers with moderate assistance within 7 day(s). 5.  Patient will perform all aspects of toileting with maximal assistance within 7 day(s). 6.  Patient will participate in upper extremity therapeutic exercise/activities with minimal assistance/contact guard assist for 5 minutes within 7 day(s). Outcome: Progressing Towards Goal   OCCUPATIONAL THERAPY TREATMENT  Patient: Gabby Covarrubias (40 y.o. female)  Date: 6/10/2022  Diagnosis: Hip fracture ([facility] [address] [S72.009A] Hip fracture (HCC)  Procedure(s) (LRB):  RIGHT IM NAIL/ HANA TABLE/ SYNTHES NAIL INTERMEDIATE  ESSENTIAL LENGTH  REQ TF (Right) 3 Days Post-Op  Precautions: Fall,WBAT  Chart, occupational therapy assessment, plan of care, and goals were reviewed. ASSESSMENT  Patient continues with skilled OT services and is progressing towards goals.  Patient presents today with increased alertness/participation, remains confused but pleasantly this session, limited complaints of pain in right lower extremity, improved balance, and impaired activity tolerance. Patient's caregiver present in room throughout session and assisting with some mobility as needed. Patient transferred to edge of bed this day, able to stand from edge of bed and remains with flexed posture but improved balance. Patient able to take several side steps to [facility] using gait belt and RW, able to advance right lower extremity, difficulty weight bearing on right to advance left. Once returned to sitting edge of bed patient participated in grooming to brush teeth with overall min A, patient has dentures and caregiver assisted to rinse dentures, provided with efferdent at end of session per request. Patient returned to semi supine and repositioned for comfort. Overall patient with improved participation and cooperation this session, making improvements toward goals. Patient would benefit from skilled OT services during admission to improve independence with self care and functional mobility/transfers. Recommend discharge to SNF at this time. Current Level of Function Impacting Discharge (ADLs): mod to total A for mobility/transfers, min A grooming, total A lower extremity dressing    Other factors to consider for discharge: prior level of function, dementia at baseline, requires A for all self care and mobility at baseline, paid caregiver         PLAN :  Patient continues to benefit from skilled intervention to address the above impairments. Continue treatment per established plan of care to address goals.     Recommend with staff: bed in modified chair position at meal times, patient assist with ADLs as able    Recommend next OT session: continue POC    Recommendation for discharge: (in order for the patient to meet his/her long term goals)  Therapy up to 5 days/week in SNF setting    This discharge recommendation:  Has been made in collaboration with the attending provider and/or case management    IF patient discharges home will need the following DME: TBD pending progress       SUBJECTIVE:   Patient stated Melvin Schwartzmahad schuster for working with me today.     OBJECTIVE DATA SUMMARY:   Cognitive/Behavioral Status:  Neurologic State: Alert;Confused  Orientation Level: Oriented to person;Disoriented to place; Disoriented to time;Disoriented to situation  Cognition: Decreased attention/concentration; Impaired decision making             Functional Mobility and Transfers for ADLs:  Bed Mobility:  Supine to Sit: Maximum assistance  Sit to Supine: Maximum assistance;Assist x2  Scooting: Total assistance    Transfers:  Sit to Stand: Moderate assistance          Balance:  Sitting: Impaired; With support  Sitting - Static: Fair (occasional)  Sitting - Dynamic: Fair (occasional)  Standing: Impaired; With support  Standing - Static: Poor;Constant support  Standing - Dynamic : Poor;Constant support    ADL Intervention:       Grooming  Position Performed: Seated edge of bed  Brushing Teeth: Minimum assistance                   Lower Body Dressing Assistance  Socks: Total assistance (dependent)  Leg Crossed Method Used: No  Position Performed: Seated edge of bed                Pain:  Some complaints of pain with weight bearing but otherwise no pain reported this session    Activity Tolerance:   Fair and requires rest breaks    After treatment patient left in no apparent distress:   Supine in bed, Call bell within reach, Bed / chair alarm activated, Caregiver / family present, and Side rails x 3    COMMUNICATION/COLLABORATION:   The patients plan of care was discussed with: Registered nurse.      Jonah Honeycutt OTR/L  Time Calculation: 28 mins

## 2022-06-10 NOTE — DISCHARGE INSTRUCTIONS
Post op Discharge Instructions Hip Fracture   Soto Alvarado  [phone]    Patient Name: Feliberto Christie  Date of admission:  6/6/2022  Date of procedure: 6/7/2022   Procedure: Procedure(s):  RIGHT IM NAIL/ HANA TABLE/ SYNTHES NAIL INTERMEDIATE  ESSENTIAL LENGTH  REQ TF  PCP: Elena Alanis MD  Date of discharge: No discharge date for patient encounter. Follow up office visit    See Dr. Piter Mobley approximately 2-3 weeks from date of surgery. Call [phone] to make an appointment. Activity   Walk with your walker as instructed by your physical therapist. Continue using your walker until seen for follow-up visit.  Perform your exercise routine 3 times a day as instructed by the physical therapist.  [place] Get up frequently and walk (with assistance as needed)   You may not drive    Incision Care   Keep a dressing on your incision and change daily. Once you incision is not draining, you may leave it open to air  [facility] hands thoroughly before changing the dressing.  You may take a shower when your incision is dry. Do not take a tub bath or go swimming. Preventing blood clots               Lovenox injection 30mg sub q once daily until 28 days post-op    Pain management   Take pain medication as prescribed; decrease the amount you take as your pain lessens   Avoid alcoholic beverages while taking pain medications   Place an ice bag on the hip for 15-20 minutes after exercising and as needed throughout the day and night    Diet   Resume usual diet; encourage fluids; eat foods high in fiber, calcium and vitamin D.   You may want to take a stool softener (such as Senokot-S or Colace) to prevent constipation while you are taking pain medication.  If constipation occurs, take a laxative (such as Dulcolax tablets, Miralax, or a suppository)      When to call your Orthopaedic Surgeon.  If you call after 5pm or on a weekend, the on call physician will be contacted   Pain that is not relieved by pain medication, ice, activity   Signs of infection  o Incision is reddened  o Incision continues to drain; drainage has an odor  o Persistent fever over 101 degrees   Signs of a blood clot in your leg  o Calf pain, tenderness, redness and/or swelling of lower leg    When to call your Primary Care Physician   Concerns about medical conditions such as diabetes, high blood pressure, asthma, congestive heart failure   Call if blood sugars are elevated, persistent headache or dizziness, coughing or congestion, constipation or diarrhea, burning with urination, abnormal heart rate (slow or fast)    When to call 911 and go to the nearest emergency room   Acute onset of chest pain, shortness of breath, difficulty breathing

## 2022-06-10 NOTE — PROGRESS NOTES
Problem: Pressure Injury - Risk of  Goal: *Prevention of pressure injury  Description: Document Salty Scale and appropriate interventions in the flowsheet. Outcome: Progressing Towards Goal  Note: Pressure Injury Interventions:  Sensory Interventions: Float heels,Minimize linen layers    Moisture Interventions: Check for incontinence Q2 hours and as needed    Activity Interventions: Increase time out of bed    Mobility Interventions: Float heels    Nutrition Interventions: Offer support with meals,snacks and hydration    Friction and Shear Interventions: Apply protective barrier, creams and emollients                Problem: Patient Education: Go to Patient Education Activity  Goal: Patient/Family Education  Outcome: Progressing Towards Goal     Problem: Falls - Risk of  Goal: *Absence of Falls  Description: Document Ana Fall Risk and appropriate interventions in the flowsheet.   Outcome: Progressing Towards Goal  Note: Fall Risk Interventions:  Mobility Interventions: Bed/chair exit alarm    Mentation Interventions: Bed/chair exit alarm    Medication Interventions: Bed/chair exit alarm    Elimination Interventions: Call light in reach    History of Falls Interventions: Bed/chair exit alarm         Problem: Patient Education: Go to Patient Education Activity  Goal: Patient/Family Education  Outcome: Progressing Towards Goal     Problem: Hypertension  Goal: *Blood pressure within specified parameters  Outcome: Progressing Towards Goal  Goal: *Fluid volume balance  Outcome: Progressing Towards Goal  Goal: *Labs within defined limits  Outcome: Progressing Towards Goal     Problem: Patient Education: Go to Patient Education Activity  Goal: Patient/Family Education  Outcome: Progressing Towards Goal     Problem: Hypotension  Goal: *Blood pressure within specified parameters  Outcome: Progressing Towards Goal  Goal: *Fluid volume balance  Outcome: Progressing Towards Goal  Goal: *Labs within defined limits  Outcome: Progressing Towards Goal     Problem: Patient Education: Go to Patient Education Activity  Goal: Patient/Family Education  Outcome: Progressing Towards Goal     Problem: Patient Education: Go to Patient Education Activity  Goal: Patient/Family Education  Outcome: Progressing Towards Goal     Problem: Patient Education: Go to Patient Education Activity  Goal: Patient/Family Education  Outcome: Progressing Towards Goal

## 2022-06-10 NOTE — PROGRESS NOTES
Orthopedic Progress Note    S: Pain well controlled resting in bed, no new complaints;Denies numbness, tingling, focal weakness, cp, sob, fever, chills     O: NAD, respirations unlabored; Dressings CDI; thigh soft, no edema, no posterior calf ttp; EHL/DF/PF 5/5, SILT; Cap refill brisk, foot warm, DP2+    Patient Vitals for the past 4 hrs:   Temp Pulse BP   06/10/22 1400 -- 80 --   06/10/22 1327 98.2 °F (36.8 °C) 80 (!) 113/52   06/10/22 1200 -- 70 --     Recent Labs     06/10/22  0044 06/08/22  0123   HGB 7.2* 8.1*   HCT 22.6*  --    *  --    BUN 12 14   CREA 0.51* 0.63   K 3.8 4.0    140            A/P:  Procedure: Procedure(s):  RIGHT IM NAIL/ HANA TABLE/ SYNTHES NAIL INTERMEDIATE  ESSENTIAL LENGTH  REQ TF  Post Op day: 3 Days Post-Op    - Hgb 7.2 - dressing clean, thigh soft, abd nonttp. Not suspicious for bleeding at surgical site. - DVT ppx - Lovenox daily x 28 days typically; SCDs  - PT/OT - WBAT  - Pain - Acetaminophen, Oxycodone; Ice, Elevation, Ace wrap as needed  - Dressing - Leave in place if it remains dry and intact; change as needed for saturation with dry sterile island dressing  - Dispo - Pending PT/OT recs; needs f/u in 2 weeks with Dr. Ty; refer to DC instructions for further details. Ortho stable for discharge, call with questions. Will sign off.      JULIA Walters  Orthopedic Trauma Service  [facility]

## 2022-06-10 NOTE — PROGRESS NOTES
ADEOLA: anticipate d/c to SNF, referrals pending in cclink to:  1st choice [facility]  2nd choice [place]      Pt resides at [facility]    Follow up with PCP/Specialist    Pt may need Rapid Covid test pending SNF policy    [facility] Transport    Main Contacts: Primary Decision Maker: Alena Gayle - [phone]  Secondary Decision Maker: Jewell Mobley - [phone]    RUR: 13%  S/p ORIF 6/7 following mechanical fall  SDH  PT/OT recs SNF    1130-CM reviewed pt chart and contacted pt's dtr, Serafin Chavez to discuss SNF choices. Will Kathy asked that CM contact her brother, Olivia Jamesburg. CM left VM for Olivia Jamesburg to discuss transitional planning further. CM to follow. 1440-CM met with pt's son, Olivia Jamesburg, in regards to SNF choices, which family is agreeable to SNF choices above. CM to follow. Transition of Care Plan:     The Plan for Transition of Care is related to the following treatment goals: SNF    The Patient and/or patient representative was provided with a choice of provider and agrees  with the discharge plan. Yes [x] No []    A Freedom of choice list was provided with basic dialogue that supports the patient's individualized plan of care/goals and shares the quality data associated with the providers.        Yes [x] No []  Ellie Anthony RN BSN CCM

## 2022-06-10 NOTE — PROGRESS NOTES
Bedside and Verbal shift change report given to Cait [address] (oncoming nurse) by Deisy Campos (offgoing nurse). Report included the following information SBAR, Kardex, MAR, Cardiac Rhythm NSR and Dual Neuro Assessment.

## 2022-06-10 NOTE — OP NOTES
Operative Report    Patient Name: Gino Palacios    Patient YOB: 1935    Patient[facility] MRN: [id number]    Date of Procedure: 06/09/22    Surgical Location: [facility]    Pre-operative Diagnosis: right closed displaced intertrochanteric hip fracture    Post-operative Diagnosis: right closed displaced intertrochanteric hip fracture    Procedure: right hip closed reduction intramedullary nail    Surgeon: Pamela Buckner MD    Anesthesia: General    Preoperative IV Antibiotics: Ancef 2g    Findings: fracture reduced, implants placed    Estimated Blood Loss: Minimal    Implants: 85 blade, TFN intermediate length [id number] right    Specimens: None    Complications: None    Disposition: PACU - hemodynamically stable. Condition: stable      Indication for Procedure: The patient presented to the hospital and was found to have a right intertrochanteric hip fracture. We discussed non-operative and operative treatment options, and I recommended surgical treatment in order to bear weight. Procedure in Detail:  The patient was met in the preoperative holding area. The appropriate surgical site was marked. All questions were answered. The patient signed a consent form and agreed to proceed forth with surgery. The patient was brought into the operating room and given general anesthesia. The patient was placed on a [place] table with the right leg in traction and the contralateral leg scissored out of the fluoroscopic plane. Preoperative fluoroscopy was used to achieve reduction on AP and lateral imaging. After reduction of the fracture, the right hip was then prepped and draped in the usual sterile fashion. A multi-disciplinary time-out was performed. Prophylactic antibiotics were dosed. X-ray fluoroscopy was used throughout the case in orthogonal views to confirm fracture reduction and implant placement. I made an incision over the greater trochanter and through the abductors.   A guide pin was placed at the tip of the greater trochanter on AP and lateral imaging and entered into the femur. The opening Reamer was used to gain entry into the femur. I used the opening reamer to gain access to the proximal femur. The nail was inserted into the femur. A pin was placed into the femoral head just inferior and posterior to the center-center line of the femoral neck. Appropriate depth was measured and the path was reamed to place the femoral head fixation. The screw was inserted and compression was applied after removing traction. A distal locking screw was placed through the distal guide. The jig was removed. The wounds were irrigated with normal saline. The abductor tendon and fascia amalia were closed with 0 Vicryl suture in Figure-8 interrupted fashion. The skin was closed with 2-0 Vicryl subcutaneously followed by staples. The wounds were covered with Xeroform, sterile gauze and Tegaderm. Surgical counts were correct at the end of the procedure. The patient was then moved from the table and placed on a postoperative bed before being awakened from anesthesia. The patient was brought to the recovery room in stable condition. Postoperative plan:  The patient will be weight-bearing as tolerated on the right lower extremity and receive physical therapy for gait training. DVT prophylaxis for 6 weeks will be required.         Bari Vann MD

## 2022-06-10 NOTE — PROGRESS NOTES
Problem: Pressure Injury - Risk of  Goal: *Prevention of pressure injury  Description: Document Salty Scale and appropriate interventions in the flowsheet. Outcome: Progressing Towards Goal  Note: Pressure Injury Interventions:  Sensory Interventions: Float heels,Minimize linen layers,Turn and reposition approx. every two hours (pillows and wedges if needed)    Moisture Interventions: Check for incontinence Q2 hours and as needed,Apply protective barrier, creams and emollients,Limit adult briefs    Activity Interventions: Increase time out of bed,PT/OT evaluation    Mobility Interventions: Float heels,Turn and reposition approx. every two hours(pillow and wedges),PT/OT evaluation    Nutrition Interventions: Offer support with meals,snacks and hydration,Document food/fluid/supplement intake    Friction and Shear Interventions: Apply protective barrier, creams and emollients,Lift sheet,Minimize layers                Problem: Patient Education: Go to Patient Education Activity  Goal: Patient/Family Education  Outcome: Progressing Towards Goal     Problem: Falls - Risk of  Goal: *Absence of Falls  Description: Document Ana Fall Risk and appropriate interventions in the flowsheet.   Outcome: Progressing Towards Goal  Note: Fall Risk Interventions:  Mobility Interventions: Bed/chair exit alarm,Patient to call before getting OOB,Strengthening exercises (ROM-active/passive)    Mentation Interventions: Bed/chair exit alarm,Reorient patient    Medication Interventions: Bed/chair exit alarm,Patient to call before getting OOB    Elimination Interventions: Call light in reach,Bed/chair exit alarm,Toilet paper/wipes in reach    History of Falls Interventions: Bed/chair exit alarm,Investigate reason for fall         Problem: Patient Education: Go to Patient Education Activity  Goal: Patient/Family Education  Outcome: Progressing Towards Goal     Problem: Hypertension  Goal: *Blood pressure within specified parameters  Outcome: Progressing Towards Goal  Goal: *Fluid volume balance  Outcome: Progressing Towards Goal  Goal: *Labs within defined limits  Outcome: Progressing Towards Goal     Problem: Patient Education: Go to Patient Education Activity  Goal: Patient/Family Education  Outcome: Progressing Towards Goal     Problem: Hypotension  Goal: *Blood pressure within specified parameters  Outcome: Progressing Towards Goal  Goal: *Fluid volume balance  Outcome: Progressing Towards Goal  Goal: *Labs within defined limits  Outcome: Progressing Towards Goal     Problem: Patient Education: Go to Patient Education Activity  Goal: Patient/Family Education  Outcome: Progressing Towards Goal     Problem: Patient Education: Go to Patient Education Activity  Goal: Patient/Family Education  Outcome: Progressing Towards Goal     Problem: Patient Education: Go to Patient Education Activity  Goal: Patient/Family Education  Outcome: Progressing Towards Goal

## 2022-06-10 NOTE — ROUTINE PROCESS
Bedside and Verbal shift change report given to Loney Duverney, RN (oncoming nurse) by Juan Luis Ventura RN (offgoing nurse). Report included the following information SBAR, Kardex, MAR, Cardiac Rhythm NSR and Dual Neuro Assessment.

## 2022-06-10 NOTE — PROGRESS NOTES
Problem: Pressure Injury - Risk of  Goal: *Prevention of pressure injury  Description: Document Salty Scale and appropriate interventions in the flowsheet. Outcome: Progressing Towards Goal  Note: Pressure Injury Interventions:  Sensory Interventions: Assess changes in LOC,Check visual cues for pain,Discuss PT/OT consult with provider,Float heels,Keep linens dry and wrinkle-free    Moisture Interventions: Absorbent underpads,Check for incontinence Q2 hours and as needed,Internal/External urinary devices    Activity Interventions: Increase time out of bed,PT/OT evaluation,Pressure redistribution bed/mattress(bed type)    Mobility Interventions: Float heels,Pressure redistribution bed/mattress (bed type),PT/OT evaluation    Nutrition Interventions: Document food/fluid/supplement intake    Friction and Shear Interventions: Apply protective barrier, creams and emollients,Lift sheet,Transferring/repositioning devices                Problem: Patient Education: Go to Patient Education Activity  Goal: Patient/Family Education  Outcome: Progressing Towards Goal     Problem: Falls - Risk of  Goal: *Absence of Falls  Description: Document Ana Fall Risk and appropriate interventions in the flowsheet.   Outcome: Progressing Towards Goal  Note: Fall Risk Interventions:  Mobility Interventions: Bed/chair exit alarm,Communicate number of staff needed for ambulation/transfer,Patient to call before getting OOB,OT consult for ADLs,PT Consult for mobility concerns    Mentation Interventions: Bed/chair exit alarm,Door open when patient unattended,Adequate sleep, hydration, pain control    Medication Interventions: Bed/chair exit alarm,Patient to call before getting OOB    Elimination Interventions: Call light in reach,Stay With Me (per policy),Toileting schedule/hourly rounds    History of Falls Interventions: Bed/chair exit alarm         Problem: Patient Education: Go to Patient Education Activity  Goal: Patient/Family Education  Outcome: Progressing Towards Goal     Problem: Hypertension  Goal: *Blood pressure within specified parameters  Outcome: Progressing Towards Goal  Goal: *Fluid volume balance  Outcome: Progressing Towards Goal  Goal: *Labs within defined limits  Outcome: Progressing Towards Goal     Problem: Patient Education: Go to Patient Education Activity  Goal: Patient/Family Education  Outcome: Progressing Towards Goal     Problem: Hypotension  Goal: *Blood pressure within specified parameters  Outcome: Progressing Towards Goal  Goal: *Fluid volume balance  Outcome: Progressing Towards Goal  Goal: *Labs within defined limits  Outcome: Progressing Towards Goal     Problem: Patient Education: Go to Patient Education Activity  Goal: Patient/Family Education  Outcome: Progressing Towards Goal     Problem: Patient Education: Go to Patient Education Activity  Goal: Patient/Family Education  Outcome: Progressing Towards Goal     Problem: Patient Education: Go to Patient Education Activity  Goal: Patient/Family Education  Outcome: Progressing Towards Goal

## 2022-06-10 NOTE — PROGRESS NOTES
Bedside and Verbal shift change report given to [facility] (oncoming nurse) by Zina Najera RN (offgoing nurse). Report included the following information SBAR, Kardex, ED Summary, Procedure Summary, Intake/Output, Recent Results, Cardiac Rhythm SR and Dual Neuro Assessment.

## 2022-06-10 NOTE — PROGRESS NOTES
[address] [facility]  Hospitalist Group                                                                                          Hospitalist Progress Note  Madyson Arriola MD  Answering service: [phone] from in house phone        Date of Service:  6/10/2022  NAME:  Carmenza Goldman  :  1935  MRN:  [id number]      Admission Summary:     Patient presents with s/p fall sustaining right intertrochanteric fracture. S/p ORIF. Also noted subdural hemorrhage. Neurosurgery following. Repeat head CT shows stable hemorrhage. Lovenox has been restarted since. Patient working with PT OT. Interval history / Subjective:     Patient with occasional agitations and needing as needed Haldol. Noted that patient has been refusing to take her scheduled Seroquel. Today, patient looking sleepy the whole day since the morning.      Assessment & Plan:     Traumatic right hip fracture  -Patient with mechanical fall sustaining displaced right intertrochanteric fracture  -S/p ORIF 2022, working with PT OT  -DVT prophylaxis resumed with Lovenox after neurosurgery approval  -As needed pain management with oxycodone and morphine    Subdural hemorrhage  -Subdural hemorrhage after mechanical fall, initial CT with moderate to large acute on chronic right frontal parietal subdural hematoma and small acute on chronic left frontoparietal subdural hematoma  -Follow-up serial head CTs with the last CT head 2022 shows stable findings  -Continue Keppra for seizure prophylaxis  -Neurosurgery following    Acute anemia  -Patient presents with hemoglobin of 12.4 and 8.1 on , and went to 6/10  -No clinical signs or symptoms of GI bleed, likely hemodilution and post surgery  -IV fluid has been discontinued 2022, monitor H&H    History of parkinsonism  -Continue Sinemet  -Continue midodrine for orthostatic hypotension    Delirium  -Hospital delirium, Haldol as needed, dose reduced to 1 mg due to extreme drowsiness after early a.m. dose  -Continue Seroquel  -Reorient, having family member presence helps, back to familiar surrounding when medically stable    History of CVA  -Patient with history of CVA in February 2019  -Plavix currently on hold due to SDH  -Continue statin    GERD  -Continue PPI    Dementia  -Continue Aricept, continue Seroquel     Code status:  Full  Prophylaxis: Lovenox  Care Plan discussed with: Patient and caregiver present at bedside  Anticipated Disposition: Likely 24 to 50 hours     Hospital Problems  Date Reviewed: 2/28/2019          Codes Class Noted POA    * (Principal) Hip fracture ([facility] [address] ICD-10-CM: F09.144G  ICD-9-CM: 820.8  6/6/2022 Unknown                Review of Systems:   A comprehensive review of systems was negative except for that written in the HPI. Vital Signs:    Last 24hrs VS reviewed since prior progress note. Most recent are:  Visit Vitals  BP (!) 132/54   Pulse 67   Temp 97.6 °F (36.4 °C)   Resp 19   Ht 4' 9\" (1.448 m)   Wt 50 kg (110 lb 4.8 oz)   SpO2 99%   BMI 23.87 kg/m²         Intake/Output Summary (Last 24 hours) at 6/10/2022 1111  Last data filed at 6/10/2022 0096  Gross per 24 hour   Intake --   Output 925 ml   Net -925 ml        Physical Examination:     I had a face to face encounter with this patient and independently examined them on 6/10/2022 as outlined below:          Constitutional:  No acute distress, somewhat drowsy   ENT:  Oral mucosa moist, oropharynx benign. Resp:  CTA bilaterally. No wheezing/rhonchi/rales. No accessory muscle use. CV:  Regular rhythm, normal rate, no murmurs, gallops, rubs    GI:  Soft, non distended, non tender. normoactive bowel sounds, no hepatosplenomegaly     Musculoskeletal:  Mild edema, warm, 2+ pulses throughout    Neurologic:  Moves all extremities.   Somewhat drowsy            Data Review:    Review and/or order of clinical lab test      Labs:     Recent Labs     06/10/22  0044 06/08/22  0123   WBC 4.9  --    HGB 7.2* 8.1*   HCT 22.6*  --    *  --      Recent Labs     06/10/22  0044 06/08/22  0123    140   K 3.8 4.0   * 109*   CO2 27 25   BUN 12 14   CREA 0.51* 0.63   GLU 86 113*   CA 8.4* 8.1*     No results for input(s): ALT, AP, TBIL, TBILI, TP, ALB, GLOB, GGT, AML, LPSE in the last 72 hours. No lab exists for component: SGOT, GPT, AMYP, HLPSE  No results for input(s): INR, PTP, APTT, INREXT, INREXT in the last 72 hours. No results for input(s): FE, TIBC, PSAT, FERR in the last 72 hours. No results found for: FOL, RBCF   No results for input(s): PH, PCO2, PO2 in the last 72 hours. No results for input(s): CPK, CKNDX, TROIQ in the last 72 hours.     No lab exists for component: CPKMB  Lab Results   Component Value Date/Time    Cholesterol, total 142 02/26/2019 03:46 AM    HDL Cholesterol 86 02/26/2019 03:46 AM    LDL, calculated 38.8 02/26/2019 03:46 AM    Triglyceride 86 02/26/2019 03:46 AM    CHOL/HDL Ratio 1.7 02/26/2019 03:46 AM     Lab Results   Component Value Date/Time    Glucose (POC) 98 06/10/2022 06:59 AM    Glucose (POC) 105 06/09/2022 10:44 PM    Glucose (POC) 136 (H) 06/09/2022 04:52 PM    Glucose (POC) 127 (H) 06/09/2022 11:26 AM    Glucose (POC) 98 06/09/2022 06:59 AM     Lab Results   Component Value Date/Time    Color YELLOW/STRAW 06/06/2022 09:56 AM    Appearance CLEAR 06/06/2022 09:56 AM    Specific gravity 1.010 06/06/2022 09:56 AM    Specific gravity 1.010 02/23/2019 02:03 PM    pH (UA) 7.5 06/06/2022 09:56 AM    Protein Negative 06/06/2022 09:56 AM    Glucose Negative 06/06/2022 09:56 AM    Ketone Negative 06/06/2022 09:56 AM    Bilirubin Negative 06/06/2022 09:56 AM    Urobilinogen 0.2 06/06/2022 09:56 AM    Nitrites Negative 06/06/2022 09:56 AM    Leukocyte Esterase Negative 06/06/2022 09:56 AM    Epithelial cells FEW 06/06/2022 09:56 AM    Bacteria Negative 06/06/2022 09:56 AM    WBC 0-4 06/06/2022 09:56 AM    RBC 0-5 06/06/2022 09:56 AM         Medications Reviewed: Current Facility-Administered Medications   Medication Dose Route Frequency    QUEtiapine (SEROquel) tablet 25 mg  25 mg Oral QPM    haloperidol lactate (HALDOL) injection 1 mg  1 mg IntraVENous Q8H PRN    acetaminophen (TYLENOL) tablet 1,000 mg  1,000 mg Oral Q8H    enoxaparin (LOVENOX) injection 30 mg  30 mg SubCUTAneous Q24H    sodium chloride (NS) flush 5-40 mL  5-40 mL IntraVENous Q8H    sodium chloride (NS) flush 5-40 mL  5-40 mL IntraVENous PRN    naloxone (NARCAN) injection 0.4 mg  0.4 mg IntraVENous PRN    calcium-vitamin D (OS-ROSA MARIA +D3) 500 mg-200 unit per tablet 1 Tablet  1 Tablet Oral TID WITH MEALS    senna-docusate (PERICOLACE) 8.6-50 mg per tablet 1 Tablet  1 Tablet Oral BID    bisacodyL (DULCOLAX) suppository 10 mg  10 mg Rectal DAILY PRN    oxyCODONE IR (ROXICODONE) tablet 2.5 mg  2.5 mg Oral Q4H PRN    oxyCODONE IR (ROXICODONE) tablet 5 mg  5 mg Oral Q4H PRN    pantoprazole (PROTONIX) tablet 40 mg  40 mg Oral ACB    sodium chloride (NS) flush 5-40 mL  5-40 mL IntraVENous Q8H    sodium chloride (NS) flush 5-40 mL  5-40 mL IntraVENous PRN    acetaminophen (TYLENOL) tablet 650 mg  650 mg Oral Q6H PRN    morphine injection 1 mg  1 mg IntraVENous Q4H PRN    ondansetron (ZOFRAN) injection 4 mg  4 mg IntraVENous Q6H PRN    polyethylene glycol (MIRALAX) packet 17 g  17 g Oral DAILY    QUEtiapine (SEROquel) tablet 12.5 mg  12.5 mg Oral DAILY AFTER BREAKFAST    midodrine (PROAMATINE) tablet 2.5 mg  2.5 mg Oral DAILY AFTER BREAKFAST    donepeziL (ARICEPT) tablet 5 mg  5 mg Oral QHS    levETIRAcetam (KEPPRA) injection 500 mg  500 mg IntraVENous Q12H    sertraline (ZOLOFT) tablet 50 mg  50 mg Oral DAILY    atorvastatin (LIPITOR) tablet 10 mg  10 mg Oral QHS    carbidopa-levodopa ER (SINEMET CR)  mg per tablet 1 Tablet  1 Tablet Oral DAILY AFTER BREAKFAST    carbidopa-levodopa (SINEMET)  mg per tablet 1.5 Tablet  1.5 Tablet Oral BIDPC ______________________________________________________________________  EXPECTED LENGTH OF STAY: 2d 14h  ACTUAL LENGTH OF STAY:          4                 Yolanda Rodrigez MD

## 2022-06-11 PROCEDURE — 74011000250 HC RX REV CODE- 250: Performed by: HOSPITALIST

## 2022-06-11 PROCEDURE — 74011250637 HC RX REV CODE- 250/637: Performed by: FAMILY MEDICINE

## 2022-06-11 PROCEDURE — 74011000250 HC RX REV CODE- 250: Performed by: ORTHOPAEDIC SURGERY

## 2022-06-11 PROCEDURE — 74011250637 HC RX REV CODE- 250/637: Performed by: ORTHOPAEDIC SURGERY

## 2022-06-11 PROCEDURE — 74011250636 HC RX REV CODE- 250/636: Performed by: FAMILY MEDICINE

## 2022-06-11 PROCEDURE — 74011250637 HC RX REV CODE- 250/637: Performed by: HOSPITALIST

## 2022-06-11 PROCEDURE — 65270000046 HC RM TELEMETRY

## 2022-06-11 PROCEDURE — 74011250636 HC RX REV CODE- 250/636: Performed by: HOSPITALIST

## 2022-06-11 PROCEDURE — 74011250637 HC RX REV CODE- 250/637: Performed by: INTERNAL MEDICINE

## 2022-06-11 PROCEDURE — 74011250636 HC RX REV CODE- 250/636: Performed by: INTERNAL MEDICINE

## 2022-06-11 PROCEDURE — 94760 N-INVAS EAR/PLS OXIMETRY 1: CPT

## 2022-06-11 RX ORDER — HALOPERIDOL 5 MG/ML
0.5 INJECTION INTRAMUSCULAR ONCE
Status: ACTIVE | OUTPATIENT
Start: 2022-06-11 | End: 2022-06-12

## 2022-06-11 RX ADMIN — LEVETIRACETAM 500 MG: 100 INJECTION INTRAVENOUS at 09:46

## 2022-06-11 RX ADMIN — CARBIDOPA AND LEVODOPA 2.5 MG: 50; 200 TABLET, EXTENDED RELEASE ORAL at 08:43

## 2022-06-11 RX ADMIN — LEVETIRACETAM 500 MG: 100 INJECTION INTRAVENOUS at 21:24

## 2022-06-11 RX ADMIN — SODIUM CHLORIDE, PRESERVATIVE FREE 10 ML: 5 INJECTION INTRAVENOUS at 21:25

## 2022-06-11 RX ADMIN — OXYCODONE 5 MG: 5 TABLET ORAL at 02:51

## 2022-06-11 RX ADMIN — DONEPEZIL HYDROCHLORIDE 5 MG: 5 TABLET, FILM COATED ORAL at 21:25

## 2022-06-11 RX ADMIN — SODIUM CHLORIDE, PRESERVATIVE FREE 10 ML: 5 INJECTION INTRAVENOUS at 13:36

## 2022-06-11 RX ADMIN — CARBIDOPA AND LEVODOPA 1.5 TABLET: 25; 100 TABLET ORAL at 17:04

## 2022-06-11 RX ADMIN — CARBIDOPA AND LEVODOPA 1.5 TABLET: 25; 100 TABLET ORAL at 08:42

## 2022-06-11 RX ADMIN — QUETIAPINE FUMARATE 25 MG: 25 TABLET ORAL at 17:04

## 2022-06-11 RX ADMIN — SERTRALINE 50 MG: 50 TABLET, FILM COATED ORAL at 08:44

## 2022-06-11 RX ADMIN — SODIUM CHLORIDE, PRESERVATIVE FREE 5 ML: 5 INJECTION INTRAVENOUS at 06:00

## 2022-06-11 RX ADMIN — ENOXAPARIN SODIUM 30 MG: 100 INJECTION SUBCUTANEOUS at 19:49

## 2022-06-11 RX ADMIN — CARBIDOPA AND LEVODOPA 1 TABLET: 25; 100 TABLET, EXTENDED RELEASE ORAL at 08:44

## 2022-06-11 RX ADMIN — ATORVASTATIN CALCIUM 10 MG: 10 TABLET, FILM COATED ORAL at 21:25

## 2022-06-11 RX ADMIN — Medication 1 TABLET: at 13:32

## 2022-06-11 RX ADMIN — QUETIAPINE FUMARATE 12.5 MG: 25 TABLET ORAL at 08:44

## 2022-06-11 RX ADMIN — POLYETHYLENE GLYCOL 3350 17 G: 17 POWDER, FOR SOLUTION ORAL at 08:41

## 2022-06-11 RX ADMIN — SENNOSIDES AND DOCUSATE SODIUM 1 TABLET: 50; 8.6 TABLET ORAL at 17:04

## 2022-06-11 RX ADMIN — ACETAMINOPHEN 1000 MG: 325 TABLET ORAL at 13:32

## 2022-06-11 RX ADMIN — PANTOPRAZOLE SODIUM 40 MG: 40 TABLET, DELAYED RELEASE ORAL at 07:30

## 2022-06-11 RX ADMIN — Medication 1 TABLET: at 17:04

## 2022-06-11 RX ADMIN — Medication 1 TABLET: at 08:41

## 2022-06-11 RX ADMIN — HALOPERIDOL LACTATE 1 MG: 5 INJECTION, SOLUTION INTRAMUSCULAR at 21:25

## 2022-06-11 RX ADMIN — ACETAMINOPHEN 1000 MG: 325 TABLET ORAL at 21:25

## 2022-06-11 RX ADMIN — SENNOSIDES AND DOCUSATE SODIUM 1 TABLET: 50; 8.6 TABLET ORAL at 08:41

## 2022-06-11 NOTE — PROGRESS NOTES
[address] [facility] Adult  Hospitalist Group                                                                                          Hospitalist Progress Note  Ronaldo Burnette MD  Answering service: [phone] from in house phone        Date of Service:  2022  NAME:  Ilya Townsend  :  1935  MRN:  [id number]      Admission Summary:     Patient presents with s/p fall sustaining right intertrochanteric fracture. S/p ORIF. Also noted subdural hemorrhage. Neurosurgery following. Repeat head CT shows stable hemorrhage. Lovenox has been restarted since. Patient working with PT OT. Interval history / Subjective:     Patient does not have any more episodes of agitations. Not requiring as needed Haldol. Overall looking more alert.      Assessment & Plan:     Traumatic right hip fracture  -Patient with mechanical fall sustaining displaced right intertrochanteric fracture  -S/p ORIF 2022, working with PT OT  -DVT prophylaxis with lovenox resumed after neurosurgery approval  -As needed pain management with oxycodone and morphine    Subdural hemorrhage  -Subdural hemorrhage after mechanical fall, initial CT with moderate to large acute on chronic right frontal parietal subdural hematoma and small acute on chronic left frontoparietal subdural hematoma  -Follow-up serial head CTs with the last CT head 2022 shows stable findings  -Continue Keppra for seizure prophylaxis  -Neurosurgery following    Acute anemia  -Patient presents with hemoglobin of 12.4 and 8.1 on , and went to 6/10  -No clinical signs or symptoms of GI bleed, likely hemodilution and post surgery  -IV fluid has been discontinued 2022, monitor H&H    History of parkinsonism  -Continue Sinemet  -Continue midodrine for orthostatic hypotension    Delirium  -Overall improved, continue Seroquel  -Reorient as needed, having family member presence helps, back to patient's familiar surrounding soon    History of CVA  -Patient with history of CVA in February 2019  -Plavix currently on hold due to SDH  -Continue statin    GERD  -Continue PPI    Dementia  -Continue Aricept, continue Seroquel     Code status:  Full  Prophylaxis: Lovenox  Care Plan discussed with: Patient and caregiver present at bedside  Anticipated Disposition: Likely 24 to 50 hours     Hospital Problems  Date Reviewed: 2/28/2019          Codes Class Noted POA    * (Principal) Hip fracture ([facility] [address] ICD-10-CM: C99.877L  ICD-9-CM: 820.8  6/6/2022 Unknown                Review of Systems:   A comprehensive review of systems was negative except for that written in the HPI. Vital Signs:    Last 24hrs VS reviewed since prior progress note. Most recent are:  Visit Vitals  /62   Pulse 86   Temp 97.7 °F (36.5 °C)   Resp 16   Ht 4' 9\" (1.448 m)   Wt 50 kg (110 lb 4.8 oz)   SpO2 98%   BMI 23.87 kg/m²         Intake/Output Summary (Last 24 hours) at 6/11/2022 1043  Last data filed at 6/11/2022 4135  Gross per 24 hour   Intake --   Output 1025 ml   Net -1025 ml        Physical Examination:     I had a face to face encounter with this patient and independently examined them on 6/11/2022 as outlined below:          Constitutional:  No acute distress, somewhat drowsy   ENT:  Oral mucosa moist, oropharynx benign. Resp:  CTA bilaterally. No wheezing/rhonchi/rales. No accessory muscle use. CV:  Regular rhythm, normal rate, no murmurs, gallops, rubs    GI:  Soft, non distended, non tender. normoactive bowel sounds, no hepatosplenomegaly     Musculoskeletal:  Mild edema, warm, 2+ pulses throughout    Neurologic:  Moves all extremities.   Somewhat drowsy            Data Review:    Review and/or order of clinical lab test      Labs:     Recent Labs     06/10/22  0044   WBC 4.9   HGB 7.2*   HCT 22.6*   *     Recent Labs     06/10/22  0044      K 3.8   *   CO2 27   BUN 12   CREA 0.51*   GLU 86   CA 8.4*     No results for input(s): ALT, AP, TBIL, TBILI, TP, ALB, GLOB, GGT, AML, LPSE in the last 72 hours. No lab exists for component: SGOT, GPT, AMYP, HLPSE  No results for input(s): INR, PTP, APTT, INREXT, INREXT in the last 72 hours. No results for input(s): FE, TIBC, PSAT, FERR in the last 72 hours. No results found for: FOL, RBCF   No results for input(s): PH, PCO2, PO2 in the last 72 hours. No results for input(s): CPK, CKNDX, TROIQ in the last 72 hours.     No lab exists for component: CPKMB  Lab Results   Component Value Date/Time    Cholesterol, total 142 02/26/2019 03:46 AM    HDL Cholesterol 86 02/26/2019 03:46 AM    LDL, calculated 38.8 02/26/2019 03:46 AM    Triglyceride 86 02/26/2019 03:46 AM    CHOL/HDL Ratio 1.7 02/26/2019 03:46 AM     Lab Results   Component Value Date/Time    Glucose (POC) 98 06/10/2022 06:59 AM    Glucose (POC) 105 06/09/2022 10:44 PM    Glucose (POC) 136 (H) 06/09/2022 04:52 PM    Glucose (POC) 127 (H) 06/09/2022 11:26 AM    Glucose (POC) 98 06/09/2022 06:59 AM     Lab Results   Component Value Date/Time    Color YELLOW/STRAW 06/06/2022 09:56 AM    Appearance CLEAR 06/06/2022 09:56 AM    Specific gravity 1.010 06/06/2022 09:56 AM    Specific gravity 1.010 02/23/2019 02:03 PM    pH (UA) 7.5 06/06/2022 09:56 AM    Protein Negative 06/06/2022 09:56 AM    Glucose Negative 06/06/2022 09:56 AM    Ketone Negative 06/06/2022 09:56 AM    Bilirubin Negative 06/06/2022 09:56 AM    Urobilinogen 0.2 06/06/2022 09:56 AM    Nitrites Negative 06/06/2022 09:56 AM    Leukocyte Esterase Negative 06/06/2022 09:56 AM    Epithelial cells FEW 06/06/2022 09:56 AM    Bacteria Negative 06/06/2022 09:56 AM    WBC 0-4 06/06/2022 09:56 AM    RBC 0-5 06/06/2022 09:56 AM         Medications Reviewed:     Current Facility-Administered Medications   Medication Dose Route Frequency    QUEtiapine (SEROquel) tablet 25 mg  25 mg Oral QPM    haloperidol lactate (HALDOL) injection 1 mg  1 mg IntraVENous Q8H PRN    acetaminophen (TYLENOL) tablet 1,000 mg 1,000 mg Oral Q8H    enoxaparin (LOVENOX) injection 30 mg  30 mg SubCUTAneous Q24H    sodium chloride (NS) flush 5-40 mL  5-40 mL IntraVENous Q8H    sodium chloride (NS) flush 5-40 mL  5-40 mL IntraVENous PRN    naloxone (NARCAN) injection 0.4 mg  0.4 mg IntraVENous PRN    calcium-vitamin D (OS-ROSA MARIA +D3) 500 mg-200 unit per tablet 1 Tablet  1 Tablet Oral TID WITH MEALS    senna-docusate (PERICOLACE) 8.6-50 mg per tablet 1 Tablet  1 Tablet Oral BID    bisacodyL (DULCOLAX) suppository 10 mg  10 mg Rectal DAILY PRN    oxyCODONE IR (ROXICODONE) tablet 2.5 mg  2.5 mg Oral Q4H PRN    oxyCODONE IR (ROXICODONE) tablet 5 mg  5 mg Oral Q4H PRN    pantoprazole (PROTONIX) tablet 40 mg  40 mg Oral ACB    sodium chloride (NS) flush 5-40 mL  5-40 mL IntraVENous Q8H    sodium chloride (NS) flush 5-40 mL  5-40 mL IntraVENous PRN    acetaminophen (TYLENOL) tablet 650 mg  650 mg Oral Q6H PRN    morphine injection 1 mg  1 mg IntraVENous Q4H PRN    ondansetron (ZOFRAN) injection 4 mg  4 mg IntraVENous Q6H PRN    polyethylene glycol (MIRALAX) packet 17 g  17 g Oral DAILY    QUEtiapine (SEROquel) tablet 12.5 mg  12.5 mg Oral DAILY AFTER BREAKFAST    midodrine (PROAMATINE) tablet 2.5 mg  2.5 mg Oral DAILY AFTER BREAKFAST    donepeziL (ARICEPT) tablet 5 mg  5 mg Oral QHS    levETIRAcetam (KEPPRA) injection 500 mg  500 mg IntraVENous Q12H    sertraline (ZOLOFT) tablet 50 mg  50 mg Oral DAILY    atorvastatin (LIPITOR) tablet 10 mg  10 mg Oral QHS    carbidopa-levodopa ER (SINEMET CR)  mg per tablet 1 Tablet  1 Tablet Oral DAILY AFTER BREAKFAST    carbidopa-levodopa (SINEMET)  mg per tablet 1.5 Tablet  1.5 Tablet Oral BIDPC     ______________________________________________________________________  EXPECTED LENGTH OF STAY: 2d 14h  ACTUAL LENGTH OF STAY:          5                 Doreen Laws MD no

## 2022-06-11 NOTE — PROGRESS NOTES
Bedside shift change report given to BARRY Alves (oncoming nurse) by Ledy Israel RN (offgoing nurse).  Report included the following information SBAR, Kardex, ED Summary, Intake/Output, MAR and Cardiac Rhythm ST.

## 2022-06-11 NOTE — ROUTINE PROCESS
TRANSFER - OUT REPORT:    Verbal report given to BARRY Galindo(name) on Dean Espinoza  being transferred to (unit) for routine progression of care       Report consisted of patients Situation, Background, Assessment and   Recommendations(SBAR). Information from the following report(s) SBAR, Kardex, MAR, Cardiac Rhythm NSR and Dual Neuro Assessment was reviewed with the receiving nurse. Lines:   Peripheral IV 06/08/22 Left Antecubital (Active)   Site Assessment Clean, dry, & intact 06/11/22 1200   Phlebitis Assessment 0 06/11/22 1200   Infiltration Assessment 0 06/11/22 1200   Dressing Status Clean, dry, & intact 06/11/22 1200   Dressing Type Transparent;Tape;Elastic bandage 06/11/22 1200   Hub Color/Line Status Blue;Capped 06/11/22 1200   Action Taken Open ports on tubing capped 06/11/22 1200   Alcohol Cap Used Yes 06/11/22 1200        Opportunity for questions and clarification was provided.       Patient transported with:   [employer]ope

## 2022-06-11 NOTE — PROGRESS NOTES
Transition of Care Plan: SNF-[facility] when medically ready  Lives at [facility]  *Patient also has medicare F.      RUR: 13% low     PCP F/U: Chrissy Alcala MD     Disposition: SNF     Transportation: BLS     Main Contact: Primary Decision Maker: Kenny Esqueda Son - [phone]  Secondary Decision Maker: Ector Mobley - [phone]    4005: Noted attending note: 24-48 hours from discharge. Telephone call to patient's daughter Keyshawn to discuss placement. Let family know that patient was accepted to [facility]. Agreeable, but would like [place] to re-review Monday if possible.      Kinza Rangel RN, CRM

## 2022-06-12 LAB
ANION GAP SERPL CALC-SCNC: 4 MMOL/L (ref 5–15)
BASOPHILS # BLD: 0 K/UL (ref 0–0.1)
BASOPHILS NFR BLD: 0 % (ref 0–1)
BUN SERPL-MCNC: 11 MG/DL (ref 6–20)
BUN/CREAT SERPL: 19 (ref 12–20)
CALCIUM SERPL-MCNC: 8.5 MG/DL (ref 8.5–10.1)
CHLORIDE SERPL-SCNC: 106 MMOL/L (ref 97–108)
CO2 SERPL-SCNC: 31 MMOL/L (ref 21–32)
CREAT SERPL-MCNC: 0.59 MG/DL (ref 0.55–1.02)
DIFFERENTIAL METHOD BLD: ABNORMAL
EOSINOPHIL # BLD: 0.1 K/UL (ref 0–0.4)
EOSINOPHIL NFR BLD: 2 % (ref 0–7)
ERYTHROCYTE [DISTWIDTH] IN BLOOD BY AUTOMATED COUNT: 15.3 % (ref 11.5–14.5)
GLUCOSE SERPL-MCNC: 111 MG/DL (ref 65–100)
HCT VFR BLD AUTO: 22.9 % (ref 35–47)
HGB BLD-MCNC: 7.4 G/DL (ref 11.5–16)
IMM GRANULOCYTES # BLD AUTO: 0 K/UL (ref 0–0.04)
IMM GRANULOCYTES NFR BLD AUTO: 1 % (ref 0–0.5)
LYMPHOCYTES # BLD: 0.9 K/UL (ref 0.8–3.5)
LYMPHOCYTES NFR BLD: 18 % (ref 12–49)
MCH RBC QN AUTO: 28.8 PG (ref 26–34)
MCHC RBC AUTO-ENTMCNC: 32.3 G/DL (ref 30–36.5)
MCV RBC AUTO: 89.1 FL (ref 80–99)
MONOCYTES # BLD: 0.5 K/UL (ref 0–1)
MONOCYTES NFR BLD: 11 % (ref 5–13)
NEUTS SEG # BLD: 3.2 K/UL (ref 1.8–8)
NEUTS SEG NFR BLD: 68 % (ref 32–75)
NRBC # BLD: 0 K/UL (ref 0–0.01)
NRBC BLD-RTO: 0 PER 100 WBC
PLATELET # BLD AUTO: 184 K/UL (ref 150–400)
PMV BLD AUTO: 9.1 FL (ref 8.9–12.9)
POTASSIUM SERPL-SCNC: 3.9 MMOL/L (ref 3.5–5.1)
RBC # BLD AUTO: 2.57 M/UL (ref 3.8–5.2)
SODIUM SERPL-SCNC: 141 MMOL/L (ref 136–145)
WBC # BLD AUTO: 4.7 K/UL (ref 3.6–11)

## 2022-06-12 PROCEDURE — 94760 N-INVAS EAR/PLS OXIMETRY 1: CPT

## 2022-06-12 PROCEDURE — 65270000046 HC RM TELEMETRY

## 2022-06-12 PROCEDURE — 74011250636 HC RX REV CODE- 250/636: Performed by: HOSPITALIST

## 2022-06-12 PROCEDURE — 77030038269 HC DRN EXT URIN PURWCK BARD -A

## 2022-06-12 PROCEDURE — 74011000250 HC RX REV CODE- 250: Performed by: ORTHOPAEDIC SURGERY

## 2022-06-12 PROCEDURE — 74011250637 HC RX REV CODE- 250/637: Performed by: ORTHOPAEDIC SURGERY

## 2022-06-12 PROCEDURE — 74011250636 HC RX REV CODE- 250/636: Performed by: INTERNAL MEDICINE

## 2022-06-12 PROCEDURE — 74011000250 HC RX REV CODE- 250: Performed by: HOSPITALIST

## 2022-06-12 PROCEDURE — 85025 COMPLETE CBC W/AUTO DIFF WBC: CPT

## 2022-06-12 PROCEDURE — 74011250637 HC RX REV CODE- 250/637: Performed by: INTERNAL MEDICINE

## 2022-06-12 PROCEDURE — 74011250637 HC RX REV CODE- 250/637: Performed by: HOSPITALIST

## 2022-06-12 PROCEDURE — 74011250637 HC RX REV CODE- 250/637: Performed by: FAMILY MEDICINE

## 2022-06-12 PROCEDURE — 36415 COLL VENOUS BLD VENIPUNCTURE: CPT

## 2022-06-12 PROCEDURE — 80048 BASIC METABOLIC PNL TOTAL CA: CPT

## 2022-06-12 RX ADMIN — POLYETHYLENE GLYCOL 3350 17 G: 17 POWDER, FOR SOLUTION ORAL at 09:48

## 2022-06-12 RX ADMIN — ACETAMINOPHEN 1000 MG: 325 TABLET ORAL at 14:16

## 2022-06-12 RX ADMIN — SENNOSIDES AND DOCUSATE SODIUM 1 TABLET: 50; 8.6 TABLET ORAL at 18:32

## 2022-06-12 RX ADMIN — LEVETIRACETAM 500 MG: 100 INJECTION INTRAVENOUS at 21:53

## 2022-06-12 RX ADMIN — CARBIDOPA AND LEVODOPA 1.5 TABLET: 25; 100 TABLET ORAL at 18:32

## 2022-06-12 RX ADMIN — CARBIDOPA AND LEVODOPA 2.5 MG: 50; 200 TABLET, EXTENDED RELEASE ORAL at 09:58

## 2022-06-12 RX ADMIN — Medication 1 TABLET: at 14:15

## 2022-06-12 RX ADMIN — ENOXAPARIN SODIUM 30 MG: 100 INJECTION SUBCUTANEOUS at 19:10

## 2022-06-12 RX ADMIN — ACETAMINOPHEN 1000 MG: 325 TABLET ORAL at 21:54

## 2022-06-12 RX ADMIN — SODIUM CHLORIDE, PRESERVATIVE FREE 10 ML: 5 INJECTION INTRAVENOUS at 06:00

## 2022-06-12 RX ADMIN — QUETIAPINE FUMARATE 12.5 MG: 25 TABLET ORAL at 09:47

## 2022-06-12 RX ADMIN — ATORVASTATIN CALCIUM 10 MG: 10 TABLET, FILM COATED ORAL at 21:54

## 2022-06-12 RX ADMIN — SODIUM CHLORIDE, PRESERVATIVE FREE 10 ML: 5 INJECTION INTRAVENOUS at 14:26

## 2022-06-12 RX ADMIN — LEVETIRACETAM 500 MG: 100 INJECTION INTRAVENOUS at 10:00

## 2022-06-12 RX ADMIN — DONEPEZIL HYDROCHLORIDE 5 MG: 5 TABLET, FILM COATED ORAL at 21:54

## 2022-06-12 RX ADMIN — Medication 1 TABLET: at 09:45

## 2022-06-12 RX ADMIN — Medication 1 TABLET: at 18:32

## 2022-06-12 RX ADMIN — SODIUM CHLORIDE, PRESERVATIVE FREE 20 ML: 5 INJECTION INTRAVENOUS at 10:12

## 2022-06-12 RX ADMIN — PANTOPRAZOLE SODIUM 40 MG: 40 TABLET, DELAYED RELEASE ORAL at 09:46

## 2022-06-12 RX ADMIN — CARBIDOPA AND LEVODOPA 1.5 TABLET: 25; 100 TABLET ORAL at 09:44

## 2022-06-12 RX ADMIN — SODIUM CHLORIDE, PRESERVATIVE FREE 10 ML: 5 INJECTION INTRAVENOUS at 21:54

## 2022-06-12 RX ADMIN — SENNOSIDES AND DOCUSATE SODIUM 1 TABLET: 50; 8.6 TABLET ORAL at 09:47

## 2022-06-12 RX ADMIN — SERTRALINE 50 MG: 50 TABLET, FILM COATED ORAL at 09:46

## 2022-06-12 RX ADMIN — CARBIDOPA AND LEVODOPA 1 TABLET: 25; 100 TABLET, EXTENDED RELEASE ORAL at 09:46

## 2022-06-12 RX ADMIN — QUETIAPINE FUMARATE 25 MG: 25 TABLET ORAL at 18:32

## 2022-06-12 NOTE — PROGRESS NOTES
[address] [facility] Adult  Hospitalist Group                                                                                          Hospitalist Progress Note  Tarik Patel MD  Answering service: [phone] from in house phone        Date of Service:  2022  NAME:  Marce Sandifer  :  1935  MRN:  [id number]      Admission Summary:     Patient presents with s/p fall sustaining right intertrochanteric fracture. S/p ORIF. Also noted subdural hemorrhage. Neurosurgery following. Repeat head CT shows stable hemorrhage. Lovenox has been restarted since. Patient working with PT OT. Interval history / Subjective:     Patient does not have any more episodes of agitations. Not requiring as needed Haldol. Overall looking more alert.      Assessment & Plan:     Traumatic right hip fracture  -Patient with mechanical fall sustaining displaced right intertrochanteric fracture  -S/p ORIF 2022, working with PT OT  -DVT prophylaxis with lovenox resumed after neurosurgery approval  -As needed pain management with oxycodone and morphine    Subdural hemorrhage  -Subdural hemorrhage after mechanical fall, initial CT with moderate to large acute on chronic right frontal parietal subdural hematoma and small acute on chronic left frontoparietal subdural hematoma  -Follow-up serial head CTs with the last CT head 2022 shows stable findings  -Continue Keppra for seizure prophylaxis  -Neurosurgery following    Acute anemia  -Patient presents with hemoglobin of 12.4 and 8.1 on , and went to 6/10  -No clinical signs or symptoms of GI bleed, likely hemodilution and post surgery  -IV fluid has been discontinued 2022, monitor H&H    History of parkinsonism  -Continue Sinemet  -Continue midodrine for orthostatic hypotension    Delirium  -Overall improved, continue Seroquel  -Reorient as needed, having family member presence helps, back to patient's familiar surrounding soon    History of CVA  -Patient with history of CVA in February 2019  -Plavix currently on hold due to SDH  -Continue statin    GERD  -Continue PPI    Dementia  -Continue Aricept, continue Seroquel     Code status:  Full  Prophylaxis: Lovenox  Care Plan discussed with: Patient and caregiver present at bedside  Anticipated Disposition: Likely 24 to 50 hours     Hospital Problems  Date Reviewed: 2/28/2019          Codes Class Noted POA    * (Principal) Hip fracture ([facility] [address] ICD-10-CM: L53.132M  ICD-9-CM: 820.8  6/6/2022 Unknown                Review of Systems:   A comprehensive review of systems was negative except for that written in the HPI. Vital Signs:    Last 24hrs VS reviewed since prior progress note. Most recent are:  Visit Vitals  /70   Pulse 76   Temp 97.3 °F (36.3 °C)   Resp 16   Ht 4' 9\" (1.448 m)   Wt 50 kg (110 lb 4.8 oz)   SpO2 95%   BMI 23.87 kg/m²         Intake/Output Summary (Last 24 hours) at 6/12/2022 1100  Last data filed at 6/12/2022 0355  Gross per 24 hour   Intake --   Output 1575 ml   Net -1575 ml        Physical Examination:     I had a face to face encounter with this patient and independently examined them on 6/12/2022 as outlined below:          Constitutional:  No acute distress, somewhat drowsy   ENT:  Oral mucosa moist, oropharynx benign. Resp:  CTA bilaterally. No wheezing/rhonchi/rales. No accessory muscle use. CV:  Regular rhythm, normal rate, no murmurs, gallops, rubs    GI:  Soft, non distended, non tender. normoactive bowel sounds, no hepatosplenomegaly     Musculoskeletal:  Mild edema, warm, 2+ pulses throughout    Neurologic:  Moves all extremities.   Somewhat drowsy            Data Review:    Review and/or order of clinical lab test      Labs:     Recent Labs     06/10/22  0044   WBC 4.9   HGB 7.2*   HCT 22.6*   *     Recent Labs     06/10/22  0044      K 3.8   *   CO2 27   BUN 12   CREA 0.51*   GLU 86   CA 8.4*     No results for input(s): ALT, AP, TBIL, TBILI, TP, ALB, GLOB, GGT, AML, LPSE in the last 72 hours. No lab exists for component: SGOT, GPT, AMYP, HLPSE  No results for input(s): INR, PTP, APTT, INREXT, INREXT in the last 72 hours. No results for input(s): FE, TIBC, PSAT, FERR in the last 72 hours. No results found for: FOL, RBCF   No results for input(s): PH, PCO2, PO2 in the last 72 hours. No results for input(s): CPK, CKNDX, TROIQ in the last 72 hours.     No lab exists for component: CPKMB  Lab Results   Component Value Date/Time    Cholesterol, total 142 02/26/2019 03:46 AM    HDL Cholesterol 86 02/26/2019 03:46 AM    LDL, calculated 38.8 02/26/2019 03:46 AM    Triglyceride 86 02/26/2019 03:46 AM    CHOL/HDL Ratio 1.7 02/26/2019 03:46 AM     Lab Results   Component Value Date/Time    Glucose (POC) 98 06/10/2022 06:59 AM    Glucose (POC) 105 06/09/2022 10:44 PM    Glucose (POC) 136 (H) 06/09/2022 04:52 PM    Glucose (POC) 127 (H) 06/09/2022 11:26 AM    Glucose (POC) 98 06/09/2022 06:59 AM     Lab Results   Component Value Date/Time    Color YELLOW/STRAW 06/06/2022 09:56 AM    Appearance CLEAR 06/06/2022 09:56 AM    Specific gravity 1.010 06/06/2022 09:56 AM    Specific gravity 1.010 02/23/2019 02:03 PM    pH (UA) 7.5 06/06/2022 09:56 AM    Protein Negative 06/06/2022 09:56 AM    Glucose Negative 06/06/2022 09:56 AM    Ketone Negative 06/06/2022 09:56 AM    Bilirubin Negative 06/06/2022 09:56 AM    Urobilinogen 0.2 06/06/2022 09:56 AM    Nitrites Negative 06/06/2022 09:56 AM    Leukocyte Esterase Negative 06/06/2022 09:56 AM    Epithelial cells FEW 06/06/2022 09:56 AM    Bacteria Negative 06/06/2022 09:56 AM    WBC 0-4 06/06/2022 09:56 AM    RBC 0-5 06/06/2022 09:56 AM         Medications Reviewed:     Current Facility-Administered Medications   Medication Dose Route Frequency    QUEtiapine (SEROquel) tablet 25 mg  25 mg Oral QPM    haloperidol lactate (HALDOL) injection 1 mg  1 mg IntraVENous Q8H PRN    acetaminophen (TYLENOL) tablet 1,000 mg 1,000 mg Oral Q8H    enoxaparin (LOVENOX) injection 30 mg  30 mg SubCUTAneous Q24H    sodium chloride (NS) flush 5-40 mL  5-40 mL IntraVENous Q8H    sodium chloride (NS) flush 5-40 mL  5-40 mL IntraVENous PRN    naloxone (NARCAN) injection 0.4 mg  0.4 mg IntraVENous PRN    calcium-vitamin D (OS-ORSA MARIA +D3) 500 mg-200 unit per tablet 1 Tablet  1 Tablet Oral TID WITH MEALS    senna-docusate (PERICOLACE) 8.6-50 mg per tablet 1 Tablet  1 Tablet Oral BID    bisacodyL (DULCOLAX) suppository 10 mg  10 mg Rectal DAILY PRN    oxyCODONE IR (ROXICODONE) tablet 2.5 mg  2.5 mg Oral Q4H PRN    oxyCODONE IR (ROXICODONE) tablet 5 mg  5 mg Oral Q4H PRN    pantoprazole (PROTONIX) tablet 40 mg  40 mg Oral ACB    sodium chloride (NS) flush 5-40 mL  5-40 mL IntraVENous Q8H    sodium chloride (NS) flush 5-40 mL  5-40 mL IntraVENous PRN    acetaminophen (TYLENOL) tablet 650 mg  650 mg Oral Q6H PRN    morphine injection 1 mg  1 mg IntraVENous Q4H PRN    ondansetron (ZOFRAN) injection 4 mg  4 mg IntraVENous Q6H PRN    polyethylene glycol (MIRALAX) packet 17 g  17 g Oral DAILY    QUEtiapine (SEROquel) tablet 12.5 mg  12.5 mg Oral DAILY AFTER BREAKFAST    midodrine (PROAMATINE) tablet 2.5 mg  2.5 mg Oral DAILY AFTER BREAKFAST    donepeziL (ARICEPT) tablet 5 mg  5 mg Oral QHS    levETIRAcetam (KEPPRA) injection 500 mg  500 mg IntraVENous Q12H    sertraline (ZOLOFT) tablet 50 mg  50 mg Oral DAILY    atorvastatin (LIPITOR) tablet 10 mg  10 mg Oral QHS    carbidopa-levodopa ER (SINEMET CR)  mg per tablet 1 Tablet  1 Tablet Oral DAILY AFTER BREAKFAST    carbidopa-levodopa (SINEMET)  mg per tablet 1.5 Tablet  1.5 Tablet Oral BIDPC     ______________________________________________________________________  EXPECTED LENGTH OF STAY: 2d 14h  ACTUAL LENGTH OF STAY:          6                 Mukesh Lawrence MD

## 2022-06-12 NOTE — PROGRESS NOTES
Problem: Pressure Injury - Risk of  Goal: *Prevention of pressure injury  Description: Document Salty Scale and appropriate interventions in the flowsheet. Outcome: Progressing Towards Goal  Note: Pressure Injury Interventions:  Sensory Interventions: Discuss PT/OT consult with provider,Assess changes in LOC    Moisture Interventions: Absorbent underpads    Activity Interventions: PT/OT evaluation    Mobility Interventions: PT/OT evaluation    Nutrition Interventions: Document food/fluid/supplement intake    Friction and Shear Interventions: Apply protective barrier, creams and emollients                Problem: Patient Education: Go to Patient Education Activity  Goal: Patient/Family Education  Outcome: Progressing Towards Goal     Problem: Falls - Risk of  Goal: *Absence of Falls  Description: Document Ana Fall Risk and appropriate interventions in the flowsheet.   Outcome: Progressing Towards Goal  Note: Fall Risk Interventions:  Mobility Interventions: Bed/chair exit alarm    Mentation Interventions: Adequate sleep, hydration, pain control    Medication Interventions: Bed/chair exit alarm    Elimination Interventions: Call light in reach    History of Falls Interventions: Bed/chair exit alarm         Problem: Patient Education: Go to Patient Education Activity  Goal: Patient/Family Education  Outcome: Progressing Towards Goal     Problem: Hypertension  Goal: *Blood pressure within specified parameters  Outcome: Progressing Towards Goal  Goal: *Fluid volume balance  Outcome: Progressing Towards Goal  Goal: *Labs within defined limits  Outcome: Progressing Towards Goal     Problem: Patient Education: Go to Patient Education Activity  Goal: Patient/Family Education  Outcome: Progressing Towards Goal     Problem: Hypotension  Goal: *Blood pressure within specified parameters  Outcome: Progressing Towards Goal  Goal: *Fluid volume balance  Outcome: Progressing Towards Goal  Goal: *Labs within defined limits  Outcome: Progressing Towards Goal     Problem: Patient Education: Go to Patient Education Activity  Goal: Patient/Family Education  Outcome: Progressing Towards Goal     Problem: Patient Education: Go to Patient Education Activity  Goal: Patient/Family Education  Outcome: Progressing Towards Goal     Problem: Patient Education: Go to Patient Education Activity  Goal: Patient/Family Education  Outcome: Progressing Towards Goal

## 2022-06-13 PROCEDURE — 74011250637 HC RX REV CODE- 250/637: Performed by: ORTHOPAEDIC SURGERY

## 2022-06-13 PROCEDURE — 74011250637 HC RX REV CODE- 250/637: Performed by: HOSPITALIST

## 2022-06-13 PROCEDURE — 74011250636 HC RX REV CODE- 250/636: Performed by: HOSPITALIST

## 2022-06-13 PROCEDURE — 65270000046 HC RM TELEMETRY

## 2022-06-13 PROCEDURE — 74011250637 HC RX REV CODE- 250/637: Performed by: INTERNAL MEDICINE

## 2022-06-13 PROCEDURE — 97530 THERAPEUTIC ACTIVITIES: CPT

## 2022-06-13 PROCEDURE — 97535 SELF CARE MNGMENT TRAINING: CPT

## 2022-06-13 PROCEDURE — 74011000250 HC RX REV CODE- 250: Performed by: HOSPITALIST

## 2022-06-13 PROCEDURE — 74011000250 HC RX REV CODE- 250: Performed by: ORTHOPAEDIC SURGERY

## 2022-06-13 PROCEDURE — 74011250637 HC RX REV CODE- 250/637: Performed by: FAMILY MEDICINE

## 2022-06-13 PROCEDURE — 77030037878 HC DRSG MEPILEX >48IN BORD MOLN -B

## 2022-06-13 PROCEDURE — 74011250636 HC RX REV CODE- 250/636: Performed by: INTERNAL MEDICINE

## 2022-06-13 RX ORDER — LEVETIRACETAM 500 MG/1
500 TABLET ORAL 2 TIMES DAILY
Status: DISCONTINUED | OUTPATIENT
Start: 2022-06-13 | End: 2022-06-14 | Stop reason: HOSPADM

## 2022-06-13 RX ADMIN — CARBIDOPA AND LEVODOPA 1.5 TABLET: 25; 100 TABLET ORAL at 09:01

## 2022-06-13 RX ADMIN — ACETAMINOPHEN 1000 MG: 325 TABLET ORAL at 05:51

## 2022-06-13 RX ADMIN — SODIUM CHLORIDE, PRESERVATIVE FREE 10 ML: 5 INJECTION INTRAVENOUS at 14:00

## 2022-06-13 RX ADMIN — SERTRALINE 50 MG: 50 TABLET, FILM COATED ORAL at 09:02

## 2022-06-13 RX ADMIN — ACETAMINOPHEN 1000 MG: 325 TABLET ORAL at 22:12

## 2022-06-13 RX ADMIN — SODIUM CHLORIDE, PRESERVATIVE FREE 10 ML: 5 INJECTION INTRAVENOUS at 05:51

## 2022-06-13 RX ADMIN — LEVETIRACETAM 500 MG: 100 INJECTION INTRAVENOUS at 08:53

## 2022-06-13 RX ADMIN — CARBIDOPA AND LEVODOPA 1.5 TABLET: 25; 100 TABLET ORAL at 18:32

## 2022-06-13 RX ADMIN — CARBIDOPA AND LEVODOPA 2.5 MG: 50; 200 TABLET, EXTENDED RELEASE ORAL at 09:02

## 2022-06-13 RX ADMIN — ACETAMINOPHEN 1000 MG: 325 TABLET ORAL at 14:30

## 2022-06-13 RX ADMIN — DONEPEZIL HYDROCHLORIDE 5 MG: 5 TABLET, FILM COATED ORAL at 22:12

## 2022-06-13 RX ADMIN — LEVETIRACETAM 500 MG: 500 TABLET, FILM COATED ORAL at 18:33

## 2022-06-13 RX ADMIN — ATORVASTATIN CALCIUM 10 MG: 10 TABLET, FILM COATED ORAL at 22:12

## 2022-06-13 RX ADMIN — SENNOSIDES AND DOCUSATE SODIUM 1 TABLET: 50; 8.6 TABLET ORAL at 18:33

## 2022-06-13 RX ADMIN — QUETIAPINE FUMARATE 12.5 MG: 25 TABLET ORAL at 09:00

## 2022-06-13 RX ADMIN — PANTOPRAZOLE SODIUM 40 MG: 40 TABLET, DELAYED RELEASE ORAL at 05:51

## 2022-06-13 RX ADMIN — ENOXAPARIN SODIUM 30 MG: 100 INJECTION SUBCUTANEOUS at 20:00

## 2022-06-13 RX ADMIN — Medication 1 TABLET: at 18:33

## 2022-06-13 RX ADMIN — CARBIDOPA AND LEVODOPA 1 TABLET: 25; 100 TABLET, EXTENDED RELEASE ORAL at 09:01

## 2022-06-13 RX ADMIN — SODIUM CHLORIDE, PRESERVATIVE FREE 10 ML: 5 INJECTION INTRAVENOUS at 22:14

## 2022-06-13 RX ADMIN — QUETIAPINE FUMARATE 25 MG: 25 TABLET ORAL at 18:33

## 2022-06-13 RX ADMIN — Medication 1 TABLET: at 05:51

## 2022-06-13 RX ADMIN — Medication 1 TABLET: at 12:17

## 2022-06-13 RX ADMIN — SENNOSIDES AND DOCUSATE SODIUM 1 TABLET: 50; 8.6 TABLET ORAL at 09:02

## 2022-06-13 NOTE — PROGRESS NOTES
Problem: Self Care Deficits Care Plan (Adult)  Goal: *Acute Goals and Plan of Care (Insert Text)  Description: FUNCTIONAL STATUS PRIOR TO ADMISSION: Patient required maximum assistance for basic and instrumental ADLs. Per daughter and caregiver, patient requires mod to max A for all ADLs and total A for IADLs. Patient required assist for functional mobility/transfers using a rollator. HOME SUPPORT: Patient stayed at [address] [place] [address], has a private caregiver from [facility] and has assist PRN from [facility] staff remainder of day. Reported by daughter as patient unable to answer questions. Occupational Therapy Goals  Initiated 6/9/2022  1. Patient will perform lower body dressing with maximal assistance using AD PRN within 7 day(s). 2.  Patient will perform grooming EOB with minimal assistance/contact guard assist within 7 day(s). 3.  Patient will perform anterior bathing from neck to thighs with moderate assistance  within 7 day(s). 4.  Patient will perform toilet transfers with moderate assistance within 7 day(s). 5.  Patient will perform all aspects of toileting with maximal assistance within 7 day(s). 6.  Patient will participate in upper extremity therapeutic exercise/activities with minimal assistance/contact guard assist for 5 minutes within 7 day(s). Outcome: Progressing Towards Goal   OCCUPATIONAL THERAPY TREATMENT  Patient: Velia Pompa (27 y.o. female)  Date: 6/13/2022  Diagnosis: Hip fracture ([facility] [address] [S72.009A] Hip fracture ([facility] [address]  Procedure(s) (LRB):  RIGHT IM NAIL/ HANA TABLE/ SYNTHES NAIL INTERMEDIATE  ESSENTIAL LENGTH  REQ TF (Right) 6 Days Post-Op  Precautions: Fall,WBAT  Chart, occupational therapy assessment, plan of care, and goals were reviewed. ASSESSMENT  Patient continues with skilled OT services and is SLOWLY progressing towards goals. Pt limited by drowsiness, low BP seated on EOB,  pain and impaired mobility. Pt achieved sitting on EOB with max assist x 2.   Performed sit to stand using RW at mod assist x 2, heavy posterior lean, increase verbal/tactile cues to work on upright position. Pt returned to supine d/t low BP:  Vitals:    06/13/22 1253 06/13/22 1255 06/13/22 1306 06/13/22 1315   BP: 102/63 (!) 85/47 102/62 112/63   BP 1 Location: Right upper arm Right upper arm Right upper arm    BP Patient Position: Supine Sitting Supine    Pulse: 64 91 85    Temp:       Resp:       Height:       Weight:       SpO2:         Pt needed to be cleaned after bowel movement, rolled side to side with max assist, total assist with hygiene. Will con't to follow and address listed goals. Current Level of Function Impacting Discharge (ADLs): mod to max assist x 2 with mobility and total assist with hygiene    Other factors to consider for discharge: low BP         PLAN :  Patient continues to benefit from skilled intervention to address the above impairments. Continue treatment per established plan of care to address goals. Recommend with staff:     Recommend next OT session: transfer to chair to groom and bathing    Recommendation for discharge: (in order for the patient to meet his/her long term goals)  Therapy up to 5 days/week in SNF setting    This discharge recommendation:  A follow-up discussion with the attending provider and/or case management is planned    IF patient discharges home will need the following DME: none       SUBJECTIVE:   Patient agreeable to work with therapy    OBJECTIVE DATA SUMMARY:   Cognitive/Behavioral Status:  Neurologic State: Agitated;Eyes open spontaneously  Orientation Level: Disoriented to situation;Oriented to person;Oriented to place;Oriented to time                Functional Mobility and Transfers for ADLs:  Bed Mobility:  Rolling: Moderate assistance;Maximum assistance  Supine to Sit: Moderate assistance;Maximum assistance (Simultaneous filing. User may not have seen previous data.)  Sit to Supine: Maximum assistance;Assist x2 (Simultaneous filing.  User may not have seen previous data.)  Scooting: Maximum assistance;Assist x2    Transfers:  Sit to Stand: Moderate assistance (Simultaneous filing. User may not have seen previous data.)          Balance:  Sitting: Impaired (Simultaneous filing. User may not have seen previous data.)  Sitting - Static: Fair (occasional)  Sitting - Dynamic: Fair (occasional)  Standing: Impaired; With support (Simultaneous filing. User may not have seen previous data.)  Standing - Static: Poor;Constant support  Standing - Dynamic : Poor;Constant support    ADL Intervention:       Grooming  Washing Face: Set-up         Lower Body Bathing  Perineal  : Total assistance (dependent)  Position Performed: Other (sidelying)         Lower Body Dressing Assistance  Protective Undergarmet: Total assistance (dependent)    Toileting  Toileting Assistance: Total assistance(dependent)  Bowel Hygiene: Total assistance (dependent)  Clothing Management: Total assistance (dependent)             Pain:  6/10p! Activity Tolerance:   Fair    After treatment patient left in no apparent distress:   Supine in bed, Call bell within reach, and Bed / chair alarm activated    COMMUNICATION/COLLABORATION:   The patients plan of care was discussed with: Physical therapist and Registered nurse.      MAGALI Mayfield/L  Time Calculation: 31 mins

## 2022-06-13 NOTE — PROGRESS NOTES
Problem: Mobility Impaired (Adult and Pediatric)  Goal: *Acute Goals and Plan of Care (Insert Text)  Description: FUNCTIONAL STATUS PRIOR TO ADMISSION: Pt lives at [address] [place] [address] and has additional hired caregiver. Pt has hx of CVA, Parkinson's disease, orthostatic hypotension, and dementia. Was ambulatory with assistance and use of rollator per family. Physical Therapy Goals  Initiated 6/9/2022  1. Patient will move from supine to sit and sit to supine, scoot up and down, and roll side to side in bed with maximal assistance within 7 day(s). 2.  Patient will transfer from bed to chair and chair to bed with maximal assistance using the least restrictive device within 7 day(s). 3.  Patient will perform sit to stand with maximal assistance within 7 day(s). 4.  Patient will ambulate with maximal assistance for 5 feet with the least restrictive device within 7 day(s). Outcome: Progressing Towards Goal     PHYSICAL THERAPY TREATMENT  Patient: Melba Frye (8 y.o. female)  Date: 6/13/2022  Diagnosis: Hip fracture ([facility] [address] [S72.009A] Hip fracture ([facility] [address]  Procedure(s) (LRB):  RIGHT IM NAIL/ HANA TABLE/ SYNTHES NAIL INTERMEDIATE  ESSENTIAL LENGTH  REQ TF (Right) 6 Days Post-Op  Precautions: Fall,WBAT  Chart, physical therapy assessment, plan of care and goals were reviewed. ASSESSMENT  Patient continues with skilled PT services and is progressing towards goals. Pt tolerates supine to sit with mod-max a and initially min A to maintain sitting at the EOB. Pt able to scoot with assistance. Pt tolerates sitting at the EOB and transferring to standing, noted to have decreased ability to bring COG over TANNER and demos posterior lean/pushing. Pt assisted to sitting. Performed additional sit with decreased volume when talking, becomes quiet, aborted and returned to supine with patient BP not reading until she was supine.  Pt noted to be orthostatic, notified RN, made pt comfortable and rolling to perform placement of new brief. Pt will benefit from continued therapy to progress towards PLOF. Current Level of Function Impacting Discharge (mobility/balance): mod-max A    Other factors to consider for discharge:          PLAN :  Patient continues to benefit from skilled intervention to address the above impairments. Continue treatment per established plan of care. to address goals. Recommendation for discharge: (in order for the patient to meet his/her long term goals)  Therapy up to 5 days/week in SNF setting    This discharge recommendation:  Has been made in collaboration with the attending provider and/or case management    IF patient discharges home will need the following DME: to be determined (TBD)       SUBJECTIVE:   Patient stated I just got started so I cant do much.     OBJECTIVE DATA SUMMARY:   Critical Behavior:  Neurologic State: Agitated,Eyes open spontaneously  Orientation Level: Disoriented to situation,Oriented to person,Oriented to place,Oriented to time  Cognition: Impaired decision making  Safety/Judgement: Decreased insight into deficits,Decreased awareness of need for safety,Decreased awareness of need for assistance  Functional Mobility Training:  Bed Mobility:  Rolling: Moderate assistance;Maximum assistance  Supine to Sit: Moderate assistance;Maximum assistance (Simultaneous filing. User may not have seen previous data.)  Sit to Supine: Maximum assistance;Assist x2 (Simultaneous filing. User may not have seen previous data.)  Scooting: Maximum assistance;Assist x2        Transfers:  Sit to Stand: Moderate assistance (Simultaneous filing. User may not have seen previous data.)  Stand to Sit: Moderate assistance (Simultaneous filing. User may not have seen previous data.)                             Balance:  Sitting: Impaired (Simultaneous filing. User may not have seen previous data.)  Sitting - Static: Fair (occasional)  Sitting - Dynamic: Fair (occasional)  Standing: Impaired; With support (Simultaneous filing. User may not have seen previous data.)  Standing - Static: Poor;Constant support  Standing - Dynamic : Poor;Constant support  Ambulation/Gait Training:          Pain Rating:  Controlled, c/o pain with movement, 6/10    Activity Tolerance:   Fair and requires rest breaks    After treatment patient left in no apparent distress:   Supine in bed, Call bell within reach, and Caregiver / family present    COMMUNICATION/COLLABORATION:   The patients plan of care was discussed with: Registered nurse and Case management.      Vj Kahn, PT   Time Calculation: 33 mins

## 2022-06-13 NOTE — PROGRESS NOTES
Clinical Pharmacy Note: IV to PO Automatic Conversion  Please note: Kristi Schofield medication(s) (Levetiracetam) has/have been changed from IV to PO based on the following critiera:    Patient is tolerating oral medications  Patient is tolerating a diet more advanced than clear liquids  Patient is not requiring vasopressors    This IV to PO conversion is based on the P&T approved automatic conversion policy for eligible patients. Please call with questions.

## 2022-06-13 NOTE — PROGRESS NOTES
[facility]                                                                                          Hospitalist Progress Note  Ana Ron MD  Answering service: [phone] from in house phone        Date of Service:  2022  NAME:  Miguel Ángel PAULINO:  1935  MRN:  [id number]      Admission Summary:     Patient presents with s/p fall sustaining right intertrochanteric fracture. S/p ORIF. Also noted subdural hemorrhage. Neurosurgery following. Repeat head CT shows stable hemorrhage. Lovenox has been restarted since. Patient working with PT OT. Interval history / Subjective:     Patient does not have any more episodes of agitations. Overall looking more alert.      Assessment & Plan:     Traumatic right hip fracture  -Patient with mechanical fall sustaining displaced right intertrochanteric fracture  -S/p ORIF 2022, working with PT OT  -DVT prophylaxis with lovenox resumed after neurosurgery approval  -As needed pain management with oxycodone and morphine    Subdural hemorrhage  -Subdural hemorrhage after mechanical fall, initial CT with moderate to large acute on chronic right frontal parietal subdural hematoma and small acute on chronic left frontoparietal subdural hematoma  -Follow-up serial head CTs with the last CT head 2022 shows stable findings  -Continue Keppra for seizure prophylaxis  -Neurosurgery previously evaluated the patient    Acute anemia  -Patient presents with hemoglobin of 12.4 and 8.1 on , and went to 6/10  -No clinical signs or symptoms of GI bleed, likely hemodilution and post surgery  -IV fluid has been discontinued 2022, monitor H&H    History of parkinsonism  -Continue Sinemet  -Continue midodrine for orthostatic hypotension    Delirium  -Overall improved, continue Seroquel  -Reorient as needed, having family member presence helps, back to patient's familiar surrounding soon    History of CVA  -Patient with history of CVA in February 2019  -Plavix currently on hold due to SDH  -Continue statin    GERD  -Continue PPI    Dementia  -Continue Aricept, continue Seroquel     Code status:  Full  Prophylaxis: Lovenox  Care Plan discussed with: Patient and caregiver present at bedside  Anticipated Disposition: Likely 24 to 50 hours     Hospital Problems  Date Reviewed: 2/28/2019          Codes Class Noted POA    * (Principal) Hip fracture ([facility] [address] ICD-10-CM: X24.010A  ICD-9-CM: 820.8  6/6/2022 Unknown                Review of Systems:   A comprehensive review of systems was negative except for that written in the HPI. Vital Signs:    Last 24hrs VS reviewed since prior progress note. Most recent are:  Visit Vitals  /63   Pulse 77   Temp 98.1 °F (36.7 °C)   Resp 14   Ht 4' 9\" (1.448 m)   Wt 50 kg (110 lb 4.8 oz)   SpO2 98%   BMI 23.87 kg/m²         Intake/Output Summary (Last 24 hours) at 6/13/2022 1124  Last data filed at 6/13/2022 0600  Gross per 24 hour   Intake 200 ml   Output 1375 ml   Net -1175 ml        Physical Examination:     I had a face to face encounter with this patient and independently examined them on 6/13/2022 as outlined below:          Constitutional:  No acute distress, somewhat drowsy   ENT:  Oral mucosa moist, oropharynx benign. Resp:  CTA bilaterally. No wheezing/rhonchi/rales. No accessory muscle use. CV:  Regular rhythm, normal rate, no murmurs, gallops, rubs    GI:  Soft, non distended, non tender. normoactive bowel sounds, no hepatosplenomegaly     Musculoskeletal:  Mild edema, warm, 2+ pulses throughout    Neurologic:  Moves all extremities.   Somewhat drowsy            Data Review:    Review and/or order of clinical lab test      Labs:     Recent Labs     06/12/22  1243   WBC 4.7   HGB 7.4*   HCT 22.9*        Recent Labs     06/12/22  1243      K 3.9      CO2 31   BUN 11   CREA 0.59   *   CA 8.5     No results for input(s): ALT, AP, TBIL, TBILI, TP, ALB, GLOB, GGT, AML, LPSE in the last 72 hours. No lab exists for component: SGOT, GPT, AMYP, HLPSE  No results for input(s): INR, PTP, APTT, INREXT, INREXT in the last 72 hours. No results for input(s): FE, TIBC, PSAT, FERR in the last 72 hours. No results found for: FOL, RBCF   No results for input(s): PH, PCO2, PO2 in the last 72 hours. No results for input(s): CPK, CKNDX, TROIQ in the last 72 hours.     No lab exists for component: CPKMB  Lab Results   Component Value Date/Time    Cholesterol, total 142 02/26/2019 03:46 AM    HDL Cholesterol 86 02/26/2019 03:46 AM    LDL, calculated 38.8 02/26/2019 03:46 AM    Triglyceride 86 02/26/2019 03:46 AM    CHOL/HDL Ratio 1.7 02/26/2019 03:46 AM     Lab Results   Component Value Date/Time    Glucose (POC) 98 06/10/2022 06:59 AM    Glucose (POC) 105 06/09/2022 10:44 PM    Glucose (POC) 136 (H) 06/09/2022 04:52 PM    Glucose (POC) 127 (H) 06/09/2022 11:26 AM    Glucose (POC) 98 06/09/2022 06:59 AM     Lab Results   Component Value Date/Time    Color YELLOW/STRAW 06/06/2022 09:56 AM    Appearance CLEAR 06/06/2022 09:56 AM    Specific gravity 1.010 06/06/2022 09:56 AM    Specific gravity 1.010 02/23/2019 02:03 PM    pH (UA) 7.5 06/06/2022 09:56 AM    Protein Negative 06/06/2022 09:56 AM    Glucose Negative 06/06/2022 09:56 AM    Ketone Negative 06/06/2022 09:56 AM    Bilirubin Negative 06/06/2022 09:56 AM    Urobilinogen 0.2 06/06/2022 09:56 AM    Nitrites Negative 06/06/2022 09:56 AM    Leukocyte Esterase Negative 06/06/2022 09:56 AM    Epithelial cells FEW 06/06/2022 09:56 AM    Bacteria Negative 06/06/2022 09:56 AM    WBC 0-4 06/06/2022 09:56 AM    RBC 0-5 06/06/2022 09:56 AM         Medications Reviewed:     Current Facility-Administered Medications   Medication Dose Route Frequency    QUEtiapine (SEROquel) tablet 25 mg  25 mg Oral QPM    haloperidol lactate (HALDOL) injection 1 mg  1 mg IntraVENous Q8H PRN    acetaminophen (TYLENOL) tablet 1,000 mg  1,000 mg Oral Q8H    enoxaparin (LOVENOX) injection 30 mg  30 mg SubCUTAneous Q24H    sodium chloride (NS) flush 5-40 mL  5-40 mL IntraVENous Q8H    sodium chloride (NS) flush 5-40 mL  5-40 mL IntraVENous PRN    naloxone (NARCAN) injection 0.4 mg  0.4 mg IntraVENous PRN    calcium-vitamin D (OS-ROSA MARIA +D3) 500 mg-200 unit per tablet 1 Tablet  1 Tablet Oral TID WITH MEALS    senna-docusate (PERICOLACE) 8.6-50 mg per tablet 1 Tablet  1 Tablet Oral BID    bisacodyL (DULCOLAX) suppository 10 mg  10 mg Rectal DAILY PRN    oxyCODONE IR (ROXICODONE) tablet 2.5 mg  2.5 mg Oral Q4H PRN    oxyCODONE IR (ROXICODONE) tablet 5 mg  5 mg Oral Q4H PRN    pantoprazole (PROTONIX) tablet 40 mg  40 mg Oral ACB    sodium chloride (NS) flush 5-40 mL  5-40 mL IntraVENous Q8H    sodium chloride (NS) flush 5-40 mL  5-40 mL IntraVENous PRN    acetaminophen (TYLENOL) tablet 650 mg  650 mg Oral Q6H PRN    morphine injection 1 mg  1 mg IntraVENous Q4H PRN    ondansetron (ZOFRAN) injection 4 mg  4 mg IntraVENous Q6H PRN    polyethylene glycol (MIRALAX) packet 17 g  17 g Oral DAILY    QUEtiapine (SEROquel) tablet 12.5 mg  12.5 mg Oral DAILY AFTER BREAKFAST    midodrine (PROAMATINE) tablet 2.5 mg  2.5 mg Oral DAILY AFTER BREAKFAST    donepeziL (ARICEPT) tablet 5 mg  5 mg Oral QHS    levETIRAcetam (KEPPRA) injection 500 mg  500 mg IntraVENous Q12H    sertraline (ZOLOFT) tablet 50 mg  50 mg Oral DAILY    atorvastatin (LIPITOR) tablet 10 mg  10 mg Oral QHS    carbidopa-levodopa ER (SINEMET CR)  mg per tablet 1 Tablet  1 Tablet Oral DAILY AFTER BREAKFAST    carbidopa-levodopa (SINEMET)  mg per tablet 1.5 Tablet  1.5 Tablet Oral BIDPC     ______________________________________________________________________  EXPECTED LENGTH OF STAY: 2d 14h  ACTUAL LENGTH OF STAY:          7                 Nguyen Dutta MD

## 2022-06-14 VITALS
BODY MASS INDEX: 19.78 KG/M2 | HEART RATE: 77 BPM | OXYGEN SATURATION: 100 % | HEIGHT: 57 IN | DIASTOLIC BLOOD PRESSURE: 70 MMHG | TEMPERATURE: 97.4 F | WEIGHT: 91.7 LBS | RESPIRATION RATE: 16 BRPM | SYSTOLIC BLOOD PRESSURE: 128 MMHG

## 2022-06-14 PROCEDURE — 74011000250 HC RX REV CODE- 250: Performed by: HOSPITALIST

## 2022-06-14 PROCEDURE — 74011250637 HC RX REV CODE- 250/637: Performed by: INTERNAL MEDICINE

## 2022-06-14 PROCEDURE — 74011250637 HC RX REV CODE- 250/637: Performed by: HOSPITALIST

## 2022-06-14 PROCEDURE — 74011250636 HC RX REV CODE- 250/636: Performed by: FAMILY MEDICINE

## 2022-06-14 PROCEDURE — 74011250637 HC RX REV CODE- 250/637: Performed by: ORTHOPAEDIC SURGERY

## 2022-06-14 RX ORDER — AMOXICILLIN 250 MG
1 CAPSULE ORAL 2 TIMES DAILY
Qty: 30 TABLET | Refills: 0 | Status: SHIPPED
Start: 2022-06-14

## 2022-06-14 RX ORDER — LEVETIRACETAM 500 MG/1
500 TABLET ORAL 2 TIMES DAILY
Qty: 30 TABLET | Refills: 0 | Status: ON HOLD
Start: 2022-06-14 | End: 2022-07-08 | Stop reason: SDUPTHER

## 2022-06-14 RX ORDER — OXYCODONE HYDROCHLORIDE 5 MG/1
5 TABLET ORAL
Qty: 15 TABLET | Refills: 0 | Status: SHIPPED | OUTPATIENT
Start: 2022-06-14 | End: 2022-06-17

## 2022-06-14 RX ORDER — QUETIAPINE FUMARATE 25 MG/1
12.5 TABLET, FILM COATED ORAL
Qty: 30 TABLET | Refills: 0 | Status: SHIPPED
Start: 2022-06-15 | End: 2022-07-05

## 2022-06-14 RX ADMIN — CARBIDOPA AND LEVODOPA 2.5 MG: 50; 200 TABLET, EXTENDED RELEASE ORAL at 08:15

## 2022-06-14 RX ADMIN — SERTRALINE 50 MG: 50 TABLET, FILM COATED ORAL at 08:15

## 2022-06-14 RX ADMIN — HALOPERIDOL LACTATE 1 MG: 5 INJECTION, SOLUTION INTRAMUSCULAR at 00:08

## 2022-06-14 RX ADMIN — OXYCODONE 5 MG: 5 TABLET ORAL at 00:20

## 2022-06-14 RX ADMIN — PANTOPRAZOLE SODIUM 40 MG: 40 TABLET, DELAYED RELEASE ORAL at 05:55

## 2022-06-14 RX ADMIN — ACETAMINOPHEN 1000 MG: 325 TABLET ORAL at 05:54

## 2022-06-14 RX ADMIN — CARBIDOPA AND LEVODOPA 1.5 TABLET: 25; 100 TABLET ORAL at 08:16

## 2022-06-14 RX ADMIN — QUETIAPINE FUMARATE 12.5 MG: 25 TABLET ORAL at 08:19

## 2022-06-14 RX ADMIN — CARBIDOPA AND LEVODOPA 1 TABLET: 25; 100 TABLET, EXTENDED RELEASE ORAL at 08:17

## 2022-06-14 RX ADMIN — SODIUM CHLORIDE, PRESERVATIVE FREE 10 ML: 5 INJECTION INTRAVENOUS at 05:55

## 2022-06-14 RX ADMIN — Medication 1 TABLET: at 06:02

## 2022-06-14 RX ADMIN — POLYETHYLENE GLYCOL 3350 17 G: 17 POWDER, FOR SOLUTION ORAL at 08:22

## 2022-06-14 RX ADMIN — LEVETIRACETAM 500 MG: 500 TABLET, FILM COATED ORAL at 05:54

## 2022-06-14 RX ADMIN — SENNOSIDES AND DOCUSATE SODIUM 1 TABLET: 50; 8.6 TABLET ORAL at 08:20

## 2022-06-14 NOTE — PROGRESS NOTES
Transition of Care Plan   RUR- Low  13%    DISPOSITION: The disposition plan is SNF: [facility]   o Call Report: [phone]/RM#[id number]   F/U with PCP/Specialist     Transport: [facility]  2pm    Transition of Care Plan to SNF/Rehab    SNF/Rehab Transition:  Patient has been accepted to [facility] and meets criteria for admission. Patient will transported by [facility] and expected to leave at 2pm.    Communication to Patient/Family:  Met with patient's son Leon Tahcker and they are agreeable to the transition plan. Communication to SNF/Rehab:  Bedside RN, Good, has been notified to update the transition plan to the facility and call report (phone number [phone]). Discharge information has been updated on the AVS.          Nursing Please include all hard scripts for controlled substances, med rec and dc summary, and AVS in packet. Reviewed and confirmed with facility,, can manage the patient care needs for the following:     SNF/Rehab Transition:  Patient to follow-up with Home Health: [facility], other ,none)  PCP/Specialist:     Reviewed and confirmed with facility,  they can manage the patient care needs for the following:     Don Cantu with (X) only those applicable:    Medication:  [x]  Medications will be available at the facility  []  IV Antibiotics  []  Controlled Substance - hard copy to be sent with patient   []  Weekly Labs   Documents:  [x] Hard RX  [x] MAR  [x] Kardex  [x] AVS  [x]Transfer Summary  [x]Discharge   Equipment:  []  CPAP/BiPAP  []  Wound Vacuum  []  Tomas or Urinary Device  []  PICC/Central Line  []  Nebulizer  []  Ventilator   Treatment:  []Isolation (for MRSA, VRE, etc.)  []Surgical Drain Management  []Tracheostomy Care  []Dressing Changes  []Dialysis with transportation and chair time.   []PEG Care  []Oxygen  []Daily Weights for Heart Failure   Dietary:  []Any diet limitations  []Tube Feedings   []Total Parenteral Management (TPN)   Eligible for Medicaid Long Term Services and Supports  Yes:  [] Eligible for medical assistance or will become eligible within 180 days and UAI completed. [] Provider/Patient and/or support system has requested screening. [] UAI copy provided to patient or responsible party. [] UAI unavailable at discharge will send once processed to SNF provider. [] UAI unavailable at discharged mailed to patient  No:   [x] Private pay and is not financially eligible for Medicaid within the next 180 days. [] Reside out-of-state. [] A residents of a state owned/operated facility that is licensed  by [address] [facility] or [facility]  [] Enrollment in [facility] hospice services  [] [address]  [] Patient /Family declines to have screening completed or provide financial information for screening     Financial Resources:  Medicaid    [] Initiated and application pending   [] Full coverage     Advanced Care Plan:  []Surrogate Decision Maker of Care  []POA  [x]Communicated Code Status , \"Full\")    Other         Per conversation with the pt's son; Callie Gross [phone]- He reported that he toured [facility] yesterday evening and he expressed his satisfaction with the facility. He reported that he would like his mother to transition there upon discharge. This cm informed him that he would contact the admission liaison to check bed availability. This cm informed him that if a bed is available we would look to arrange transport for 2pm as the pt is medically stable for discharge. He consented to the plan. Medicare pt has received, reviewed, and signed 2nd IM letter informing them of their right to appeal the discharge. Signed copy has been placed on pt bedside chart.     CM: [address] [place],   [phone]

## 2022-06-14 NOTE — PROGRESS NOTES
Problem: Pressure Injury - Risk of  Goal: *Prevention of pressure injury  Description: Document Salty Scale and appropriate interventions in the flowsheet. Outcome: Progressing Towards Goal  Note: Pressure Injury Interventions:  Sensory Interventions: Assess changes in LOC    Moisture Interventions: Absorbent underpads    Activity Interventions: Assess need for specialty bed    Mobility Interventions: Assess need for specialty bed,Turn and reposition approx.  every two hours(pillow and wedges)    Nutrition Interventions: Document food/fluid/supplement intake    Friction and Shear Interventions: Apply protective barrier, creams and emollients

## 2022-06-14 NOTE — DISCHARGE SUMMARY
Discharge Summary       PATIENT ID: Gino Palacios  MRN: [id number]   YOB: 1935    DATE OF ADMISSION: 6/6/2022  6:36 AM    DATE OF DISCHARGE: 6/14/2022   PRIMARY CARE PROVIDER: Vahe Oro MD     ATTENDING PHYSICIAN: Jacinto Barreto  DISCHARGING PROVIDER: Mars Shabazz MD    To contact this individual call [phone] and ask the  to page. If unavailable ask to be transferred the Adult Hospitalist Department. CONSULTATIONS: IP CONSULT TO ORTHOPEDIC SURGERY  IP CONSULT TO NEUROSURGERY  IP CONSULT TO NEUROLOGY    PROCEDURES/SURGERIES: Procedure(s):  RIGHT IM NAIL/ HANA TABLE/ SYNTHES NAIL INTERMEDIATE  ESSENTIAL LENGTH  REQ TF    DISCHARGE DIAGNOSES:     Traumatic right hip fracture  Subdural hemorrhage  Acute anemia  Parkinsonism  Delirium  History of CVA  GERD  Dementia    ADMISSION SUMMARY AND HOSPITAL COURSE:     RON Palacios is a 80 y.o. female who presents with ground level fall. Patient came from [address] [place] [address] home after reported ground-level fall EMS said that she had fall on a carpeted floor did not strike her head with no loss of consciousness she takes Plavix daily as per daughter for previous strokes. She was initially complaining of left-sided hip pain during transport she also complains of right hip pain as well denies any headache vomiting loss of consciousness patient was alert oriented and could give history as well in the ED. x-ray showing right hip fracture and CT scan of the head showing subdural hemorrhage. Hospital Course  Patient presents with mechanical fall with displaced right intratrochanteric fracture of the right femur. Patient was evaluated by orthopedic surgery, s/p ORIF on 6/7/2022. Patient did well postoperatively and working with PT OT. Patient also suffered from subdural hemorrhage after her mechanical fall.   Initial CT showing moderate to large acute on chronic right frontal parietal subdural hematoma and small acute on chronic left frontoparietal subdural hematoma. Neurosurgery evaluated the patient and serial head CTs showing stable findings. Patient was started on Keppra for seizure prophylaxis. No intervention recommended by neurosurgery. Patient's clinical course has been complicated by hospital induced delirium requiring Haldol as needed. But patient continues to show signs of improvement. Patient discharged to SNF for physical therapy. DISCHARGE DIAGNOSES / PLAN:        BMI: Body mass index is 19.84 kg/m². . This patient: Has a BMI within normal limits. PENDING TEST RESULTS:   At the time of discharge the following test results are still pending: None     ADDITIONAL CARE RECOMMENDATIONS:     PCP as scheduled    NOTIFY YOUR PHYSICIAN FOR ANY OF THE FOLLOWING:   Fever over 101 degrees for 24 hours. Chest pain, shortness of breath, fever, chills, nausea, vomiting, diarrhea, change in mentation, falling, weakness, bleeding. Severe pain or pain not relieved by medications, as well as any other signs or symptoms that you may have questions about. FOLLOW UP APPOINTMENTS:    Follow-up Information     Follow up With Specialties Details Why Percy Parker MD Neurosurgery Schedule an appointment as soon as possible for a visit in 2 weeks CT review. Patient to call the office for f/u appt. [facility] will call patient mine to schedule head CT prior to appt [address]      Maribel Mcnulty MD Family Medicine   [address] [place] [address]  [phone]               DIET: Cardiac Diet    ACTIVITY: PT/OT Eval and Treat    EQUIPMENT needed: None    DISCHARGE MEDICATIONS:  Current Discharge Medication List      START taking these medications    Details   levETIRAcetam (KEPPRA) 500 mg tablet Take 1 Tablet by mouth two (2) times a day.   Qty: 30 Tablet, Refills: 0  Start date: 6/14/2022      oxyCODONE IR (ROXICODONE) 5 mg immediate release tablet Take 1 Tablet by mouth every four (4) hours as needed for Pain for up to 3 days. Max Daily Amount: 30 mg.  Qty: 15 Tablet, Refills: 0  Start date: 6/14/2022, End date: 6/17/2022    Associated Diagnoses: Closed fracture of right hip, initial encounter ([facility]      senna-docusate (PERICOLACE) 8.6-50 mg per tablet Take 1 Tablet by mouth two (2) times a day. Qty: 30 Tablet, Refills: 0  Start date: 6/14/2022         CONTINUE these medications which have CHANGED    Details   !! QUEtiapine (SEROquel) 25 mg tablet Take 0.5 Tablets by mouth daily (after breakfast). Qty: 30 Tablet, Refills: 0  Start date: 6/15/2022       !! - Potential duplicate medications found. Please discuss with provider. CONTINUE these medications which have NOT CHANGED    Details   carbidopa-levodopa (SINEMET)  mg per tablet Take 1.5 Tablets by mouth two (2) times daily (after meals). Breakfast and dinner      carbidopa-levodopa ER (SINEMET CR)  mg per tablet Take 1 Tablet by mouth daily (after breakfast). pantoprazole (Protonix) 20 mg tablet Take 20 mg by mouth daily. sertraline (ZOLOFT) 50 mg tablet Take 50 mg by mouth daily. !! QUEtiapine (SEROqueL) 25 mg tablet Take 25 mg by mouth daily (with dinner). multivitamin, tx-iron-ca-min (THERA-M w/ IRON) 9 mg iron-400 mcg tab tablet Take 1 Tablet by mouth daily. midodrine (PROAMATINE) 2.5 mg tablet Take 2.5 mg by mouth daily. donepeziL (Aricept) 5 mg tablet Take 5 mg by mouth nightly. simvastatin (ZOCOR) 20 mg tablet Take  by mouth nightly. ondansetron (ZOFRAN ODT) 4 mg disintegrating tablet Take 4 mg by mouth every eight (8) hours as needed for Nausea. polyethylene glycol (MIRALAX) 17 gram packet Take 17 g by mouth as needed. acetaminophen (TYLENOL ARTHRITIS PAIN) 650 mg CR tablet Take 650 mg by mouth every six (6) hours as needed for Pain. !! - Potential duplicate medications found.  Please discuss with provider. STOP taking these medications       clopidogreL (Plavix) 75 mg tab Comments:   Reason for Stopping:               DISPOSITION:    Home With:   OT  PT  HH  RN       Long term SNF/Inpatient Rehab    Independent/assisted living    Hospice    Other:       PATIENT CONDITION AT DISCHARGE:     Functional status    Poor    * Deconditioned     Independent      Cognition     Lucid     Forgetful    * Dementia      Catheters/lines (plus indication)    Tomas     PICC     PEG    * None      Code status   *  Full code     DNR      PHYSICAL EXAMINATION AT DISCHARGE:  General:          Alert, cooperative, no distress, appears stated age. HEENT:           Atraumatic, anicteric sclerae, pink conjunctivae                          No oral ulcers, mucosa moist, throat clear, dentition fair  Neck:               Supple, symmetrical  Lungs:             Clear to auscultation bilaterally. No Wheezing or Rhonchi. No rales. Chest wall:      No tenderness  No Accessory muscle use. Heart:              Regular  rhythm,  No  murmur   No edema  Abdomen:        Soft, non-tender. Not distended. Bowel sounds normal  Extremities:     No cyanosis. No clubbing,                            Skin turgor normal, Capillary refill normal  Skin:                Not pale. Not Jaundiced  No rashes   Psych:             Not anxious or agitated.   Neurologic:      Alert, moves all extremities, answers questions appropriately and responds to commands       CHRONIC MEDICAL DIAGNOSES:  Problem List as of 6/14/2022 Date Reviewed: 2/28/2019          Codes Class Noted - Resolved    * (Principal) Hip fracture ([facility] ICD-10-CM: S72.009A  ICD-9-CM: 820.8  6/6/2022 - Present        Orthostatic hypotension ICD-10-CM: I95.1  ICD-9-CM: 458.0  2/28/2019 - Present        Parkinson disease ([facility] ICD-10-CM: G20  ICD-9-CM: 332.0  2/28/2019 - Present        Internal carotid artery dissection ([facility] ICD-10-CM: I77.71  ICD-9-CM: 443.21  2/24/2019 - Present        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

## 2022-06-20 NOTE — PROGRESS NOTES
Physician Progress Note      PATIENT:               Sam Hernandez  CSN #:                  [id number]  :                       1935  ADMIT DATE:       2022 6:36 AM  DISCH DATE:        2022 3:21 PM  RESPONDING  PROVIDER #:        Matt Murillo MD        QUERY TEXT:    Type of Anemia: Please provide further specificity, if known. Clinical indicators include: hemorrhage, hematoma, acute anemia, hemoglobin, gi bleed, h&h, hgb, hct, fe, tibc, rbc, cute anemia, bleeding, iron  Options provided:  -- Anemia due to acute blood loss  -- Anemia due to chronic blood loss  -- Anemia due to iron deficiency  -- Anemia due to postoperative blood loss  -- Anemia due to chronic disease  -- Other - I will add my own diagnosis  -- Disagree - Not applicable / Not valid  -- Disagree - Clinically Unable to determine / Unknown        PROVIDER RESPONSE TEXT:    The patient has anemia due to chronic disease.       Electronically signed by:  Matt Murillo MD 2022 7:28 AM

## 2022-07-05 ENCOUNTER — HOSPITAL ENCOUNTER (INPATIENT)
Age: 87
LOS: 3 days | Discharge: NURSING, REHAB OR LONG-TERM CARE | DRG: 064 | End: 2022-07-08
Attending: EMERGENCY MEDICINE | Admitting: FAMILY MEDICINE
Payer: MEDICARE

## 2022-07-05 ENCOUNTER — APPOINTMENT (OUTPATIENT)
Dept: CT IMAGING | Age: 87
DRG: 064 | End: 2022-07-05
Attending: EMERGENCY MEDICINE
Payer: MEDICARE

## 2022-07-05 ENCOUNTER — APPOINTMENT (OUTPATIENT)
Dept: GENERAL RADIOLOGY | Age: 87
DRG: 064 | End: 2022-07-05
Attending: EMERGENCY MEDICINE
Payer: MEDICARE

## 2022-07-05 DIAGNOSIS — R29.90 STROKE-LIKE SYMPTOMS: Primary | ICD-10-CM

## 2022-07-05 DIAGNOSIS — Z87.898 HISTORY OF SEIZURE: ICD-10-CM

## 2022-07-05 PROBLEM — I63.9 CVA (CEREBRAL VASCULAR ACCIDENT) (HCC): Status: ACTIVE | Noted: 2022-07-05

## 2022-07-05 LAB
ALBUMIN SERPL-MCNC: 3.2 G/DL (ref 3.5–5)
ALBUMIN/GLOB SERPL: 0.8 {RATIO} (ref 1.1–2.2)
ALP SERPL-CCNC: 126 U/L (ref 45–117)
ALT SERPL-CCNC: 7 U/L (ref 12–78)
ANION GAP SERPL CALC-SCNC: 0 MMOL/L (ref 5–15)
AST SERPL-CCNC: 17 U/L (ref 15–37)
BASOPHILS # BLD: 0 K/UL (ref 0–0.1)
BASOPHILS NFR BLD: 1 % (ref 0–1)
BILIRUB SERPL-MCNC: 0.5 MG/DL (ref 0.2–1)
BUN SERPL-MCNC: 14 MG/DL (ref 6–20)
BUN/CREAT SERPL: 16 (ref 12–20)
CALCIUM SERPL-MCNC: 9.1 MG/DL (ref 8.5–10.1)
CHLORIDE SERPL-SCNC: 109 MMOL/L (ref 97–108)
CO2 SERPL-SCNC: 32 MMOL/L (ref 21–32)
COMMENT, HOLDF: NORMAL
CREAT SERPL-MCNC: 0.9 MG/DL (ref 0.55–1.02)
DIFFERENTIAL METHOD BLD: ABNORMAL
EOSINOPHIL # BLD: 0.1 K/UL (ref 0–0.4)
EOSINOPHIL NFR BLD: 2 % (ref 0–7)
ERYTHROCYTE [DISTWIDTH] IN BLOOD BY AUTOMATED COUNT: 15.2 % (ref 11.5–14.5)
GLOBULIN SER CALC-MCNC: 3.9 G/DL (ref 2–4)
GLUCOSE SERPL-MCNC: 116 MG/DL (ref 65–100)
HCT VFR BLD AUTO: 34.2 % (ref 35–47)
HGB BLD-MCNC: 10.7 G/DL (ref 11.5–16)
IMM GRANULOCYTES # BLD AUTO: 0 K/UL (ref 0–0.04)
IMM GRANULOCYTES NFR BLD AUTO: 0 % (ref 0–0.5)
INR PPP: 1.1 (ref 0.9–1.1)
LYMPHOCYTES # BLD: 0.9 K/UL (ref 0.8–3.5)
LYMPHOCYTES NFR BLD: 28 % (ref 12–49)
MCH RBC QN AUTO: 28.7 PG (ref 26–34)
MCHC RBC AUTO-ENTMCNC: 31.3 G/DL (ref 30–36.5)
MCV RBC AUTO: 91.7 FL (ref 80–99)
MONOCYTES # BLD: 0.3 K/UL (ref 0–1)
MONOCYTES NFR BLD: 10 % (ref 5–13)
NEUTS SEG # BLD: 2 K/UL (ref 1.8–8)
NEUTS SEG NFR BLD: 59 % (ref 32–75)
NRBC # BLD: 0 K/UL (ref 0–0.01)
NRBC BLD-RTO: 0 PER 100 WBC
PLATELET # BLD AUTO: 208 K/UL (ref 150–400)
PMV BLD AUTO: 8.7 FL (ref 8.9–12.9)
POTASSIUM SERPL-SCNC: 3.9 MMOL/L (ref 3.5–5.1)
PROT SERPL-MCNC: 7.1 G/DL (ref 6.4–8.2)
PROTHROMBIN TIME: 11 SEC (ref 9–11.1)
RBC # BLD AUTO: 3.73 M/UL (ref 3.8–5.2)
RBC MORPH BLD: ABNORMAL
SAMPLES BEING HELD,HOLD: NORMAL
SODIUM SERPL-SCNC: 141 MMOL/L (ref 136–145)
TROPONIN-HIGH SENSITIVITY: 7 NG/L (ref 0–51)
WBC # BLD AUTO: 3.3 K/UL (ref 3.6–11)

## 2022-07-05 PROCEDURE — 99285 EMERGENCY DEPT VISIT HI MDM: CPT

## 2022-07-05 PROCEDURE — 74011000636 HC RX REV CODE- 636: Performed by: EMERGENCY MEDICINE

## 2022-07-05 PROCEDURE — 85025 COMPLETE CBC W/AUTO DIFF WBC: CPT

## 2022-07-05 PROCEDURE — 94762 N-INVAS EAR/PLS OXIMTRY CONT: CPT

## 2022-07-05 PROCEDURE — 70496 CT ANGIOGRAPHY HEAD: CPT

## 2022-07-05 PROCEDURE — 85610 PROTHROMBIN TIME: CPT

## 2022-07-05 PROCEDURE — 70450 CT HEAD/BRAIN W/O DYE: CPT

## 2022-07-05 PROCEDURE — 0042T CT CODE NEURO PERF W CBF: CPT

## 2022-07-05 PROCEDURE — 74011250637 HC RX REV CODE- 250/637: Performed by: FAMILY MEDICINE

## 2022-07-05 PROCEDURE — 71045 X-RAY EXAM CHEST 1 VIEW: CPT

## 2022-07-05 PROCEDURE — 36415 COLL VENOUS BLD VENIPUNCTURE: CPT

## 2022-07-05 PROCEDURE — 65270000046 HC RM TELEMETRY

## 2022-07-05 PROCEDURE — 84484 ASSAY OF TROPONIN QUANT: CPT

## 2022-07-05 PROCEDURE — 4A03X5D MEASUREMENT OF ARTERIAL FLOW, INTRACRANIAL, EXTERNAL APPROACH: ICD-10-PCS | Performed by: STUDENT IN AN ORGANIZED HEALTH CARE EDUCATION/TRAINING PROGRAM

## 2022-07-05 PROCEDURE — 80053 COMPREHEN METABOLIC PANEL: CPT

## 2022-07-05 RX ORDER — SERTRALINE HYDROCHLORIDE 50 MG/1
50 TABLET, FILM COATED ORAL EVERY EVENING
Status: DISCONTINUED | OUTPATIENT
Start: 2022-07-06 | End: 2022-07-08 | Stop reason: HOSPADM

## 2022-07-05 RX ORDER — QUETIAPINE FUMARATE 25 MG/1
12.5 TABLET, FILM COATED ORAL ONCE
COMMUNITY

## 2022-07-05 RX ORDER — PANTOPRAZOLE SODIUM 20 MG/1
20 TABLET, DELAYED RELEASE ORAL DAILY
Status: DISCONTINUED | OUTPATIENT
Start: 2022-07-06 | End: 2022-07-08 | Stop reason: HOSPADM

## 2022-07-05 RX ORDER — BUSPIRONE HYDROCHLORIDE 10 MG/1
5 TABLET ORAL
Status: DISCONTINUED | OUTPATIENT
Start: 2022-07-06 | End: 2022-07-08 | Stop reason: HOSPADM

## 2022-07-05 RX ORDER — CARBIDOPA AND LEVODOPA 25; 100 MG/1; MG/1
1.5 TABLET ORAL
Status: DISCONTINUED | OUTPATIENT
Start: 2022-07-05 | End: 2022-07-08 | Stop reason: HOSPADM

## 2022-07-05 RX ORDER — OXYCODONE HYDROCHLORIDE 5 MG/1
5 TABLET ORAL
COMMUNITY

## 2022-07-05 RX ORDER — QUETIAPINE FUMARATE 25 MG/1
12.5 TABLET, FILM COATED ORAL ONCE
Status: DISCONTINUED | OUTPATIENT
Start: 2022-07-05 | End: 2022-07-05

## 2022-07-05 RX ORDER — AMOXICILLIN 250 MG
1 CAPSULE ORAL 2 TIMES DAILY
Status: DISCONTINUED | OUTPATIENT
Start: 2022-07-06 | End: 2022-07-08 | Stop reason: HOSPADM

## 2022-07-05 RX ORDER — QUETIAPINE FUMARATE 25 MG/1
25 TABLET, FILM COATED ORAL ONCE
Status: DISCONTINUED | OUTPATIENT
Start: 2022-07-05 | End: 2022-07-05

## 2022-07-05 RX ORDER — BUSPIRONE HYDROCHLORIDE 5 MG/1
5 TABLET ORAL
COMMUNITY

## 2022-07-05 RX ORDER — ATORVASTATIN CALCIUM 10 MG/1
10 TABLET, FILM COATED ORAL
Status: DISCONTINUED | OUTPATIENT
Start: 2022-07-05 | End: 2022-07-08 | Stop reason: HOSPADM

## 2022-07-05 RX ORDER — CARBIDOPA AND LEVODOPA 25; 100 MG/1; MG/1
1.5 TABLET ORAL
Status: DISCONTINUED | OUTPATIENT
Start: 2022-07-06 | End: 2022-07-05

## 2022-07-05 RX ORDER — DONEPEZIL HYDROCHLORIDE 5 MG/1
5 TABLET, FILM COATED ORAL
Status: DISCONTINUED | OUTPATIENT
Start: 2022-07-05 | End: 2022-07-08 | Stop reason: HOSPADM

## 2022-07-05 RX ORDER — QUETIAPINE FUMARATE 25 MG/1
12.5 TABLET, FILM COATED ORAL EVERY MORNING
Status: DISCONTINUED | OUTPATIENT
Start: 2022-07-06 | End: 2022-07-08 | Stop reason: HOSPADM

## 2022-07-05 RX ORDER — LEVETIRACETAM 500 MG/1
500 TABLET ORAL 2 TIMES DAILY
Status: DISCONTINUED | OUTPATIENT
Start: 2022-07-05 | End: 2022-07-06

## 2022-07-05 RX ORDER — QUETIAPINE FUMARATE 25 MG/1
25 TABLET, FILM COATED ORAL
Status: DISCONTINUED | OUTPATIENT
Start: 2022-07-05 | End: 2022-07-08 | Stop reason: HOSPADM

## 2022-07-05 RX ADMIN — IOPAMIDOL 100 ML: 755 INJECTION, SOLUTION INTRAVENOUS at 12:10

## 2022-07-05 RX ADMIN — DONEPEZIL HYDROCHLORIDE 5 MG: 5 TABLET, FILM COATED ORAL at 22:31

## 2022-07-05 RX ADMIN — LEVETIRACETAM 500 MG: 500 TABLET, FILM COATED ORAL at 22:31

## 2022-07-05 RX ADMIN — CARBIDOPA AND LEVODOPA 1.5 TABLET: 25; 100 TABLET ORAL at 22:30

## 2022-07-05 RX ADMIN — IOPAMIDOL 20 ML: 755 INJECTION, SOLUTION INTRAVENOUS at 12:09

## 2022-07-05 RX ADMIN — ATORVASTATIN CALCIUM 10 MG: 10 TABLET, FILM COATED ORAL at 22:31

## 2022-07-05 RX ADMIN — QUETIAPINE FUMARATE 25 MG: 25 TABLET ORAL at 22:31

## 2022-07-05 NOTE — ED TRIAGE NOTES
Patient arrives via EMS from [facility] with c/o witnessed unresponisveness at 11:10. Per EMS patient was eating lunch and became unresponsive. Code Stroke Level 1 Called 11:35    /60 HR 81 O2 98% RA    Hx: Parkinson's, dementia, Sz, HTN, subdural hematoma, R closed hip fractuc (6/14/2022)    11:50 Patient to CT.

## 2022-07-05 NOTE — PROGRESS NOTES
Occupational Therapy Screening:  Services maybe indicated at this time. [facility] based on results obtained during the nursing admission assessment. The patients chart was reviewed . Please order a consult for occupational therapy if patient has had a decline in function from baseline and you would like an evaluation to be completed. Thank you.

## 2022-07-05 NOTE — ADVANCED PRACTICE NURSE
Neurocritical Care Code Stroke Documentation      Symptoms:  sudden unresponsive at [facility] of recent R Hip Fracture   and SDH with SZ, dementia and Parkinson's    Pt noted with left eye deviation and non-verbal upon arrival with ability to lift and hold arms. Began to become more alert and conversant while in CT scanner and left facial droop noted as well as slurred speech. Baseline mRS:  Observed by caregiver at 0   Last Known Well:  22 1110   Medical hx: Past Medical History:   Diagnosis Date    GERD (gastroesophageal reflux disease)     Heart failure (HCC)     Hypertension       Anticoagulation:  none   VAN:   Positive   NIHSS: after imaging   1a-LOC: 0    1b-Month/Age:2    1c-Open/Close Hand:0    2-Best Gaze:0    3-Visual Fields:0    4-Facial Palsy:1 (left face)    5a-Left Arm:0    5b-Right Arm:0    6a-Left Le    6b-Right Le    7-Limb Ataxia:0    8-Sensory:1 (right sided sensory deficits, face, arm, leg)    9-Best Language:0    10-Dysarthria:1    11-Extinction/Inattention:0  TOTAL SCORE: 5   Imaging:   CT:Redemonstrated bilateral right-larger-than-left acute on chronic subdural  hematomas, slightly decreased compared to prior. Stable mass effect upon the  right cerebral hemisphere and 5 mm leftward midline shift. CTA: Chronic left distal ICA dissection without flow limitation or associated  thrombus. 2.  No intracranial large vessel occlusion. 3.  Bilateral subdural hematomas right greater than left. CTP: No perfusion mismatch   Plan:   TPA Candidate: NO--SDH and low suspicion for stroke    Mechanical thrombectomy Candidate: NO--no LVO     *Perform dysphagia screening prior to any PO intake*        Discussed with: Dr. Rafael Strange. Dr. John Bryant, Pt, and ED RN    Arrival time: in ED upon arrival  Time spent: 60 minutes.      Gilmar Garza NP  Neurocritical Care Nurse Practitioner  [phone]

## 2022-07-05 NOTE — ED PROVIDER NOTES
Date of Service:  7/5/2022    Patient:  Eva Duval    Chief Complaint:  Stroke symptoms       HPI:  Eva Duval is a 80 y.o.  female who presents for evaluation of stroke. Patient was apparently at her baseline this morning and about 11 AM became somewhat somnolent not very responsive. Patient arrives via as a level 1 stroke alert. Patient unable to provide any information on arrival. She arrives with left eye gaze, facial droop and slurring when she does speak. History of recent fall with b/l subdurals. Past Medical History:   Diagnosis Date    GERD (gastroesophageal reflux disease)     Heart failure ([facility]     Hypertension        Past Surgical History:   Procedure Laterality Date    HX GYN      hysterectomy    HX HEENT      catacacts removed    VT BREAST SURGERY PROCEDURE UNLISTED      l breast 2 cyst         Family History:   Problem Relation Age of Onset    Stroke Mother     Heart Disease Father        Social History     Socioeconomic History    Marital status:      Spouse name: Not on file    Number of children: Not on file    Years of education: Not on file    Highest education level: Not on file   Occupational History    Not on file   Tobacco Use    Smoking status: Never Smoker    Smokeless tobacco: Not on file   Substance and Sexual Activity    Alcohol use: Yes     Comment: occasional    Drug use: No    Sexual activity: Not on file   Other Topics Concern    Not on file   Social History Narrative    Not on file     Social Determinants of Health     Financial Resource Strain:     Difficulty of Paying Living Expenses: Not on file   Food Insecurity:     Worried About Running Out of Food in the Last Year: Not on file    Saad of Food in the Last Year: Not on file   Transportation Needs:     Lack of Transportation (Medical): Not on file    Lack of Transportation (Non-Medical):  Not on file   Physical Activity:     Days of Exercise per Week: Not on file    Minutes of Exercise per Session: Not on file   Stress:     Feeling of Stress : Not on file   Social Connections:     Frequency of Communication with Friends and Family: Not on file    Frequency of Social Gatherings with Friends and Family: Not on file    Attends Voodoo Services: Not on file    Active Member of Clubs or Organizations: Not on file    Attends Club or Organization Meetings: Not on file    Marital Status: Not on file   Intimate Partner Violence:     Fear of Current or Ex-Partner: Not on file    Emotionally Abused: Not on file    Physically Abused: Not on file    Sexually Abused: Not on file   Housing Stability:     Unable to Pay for Housing in the Last Year: Not on file    Number of [place] in the Last Year: Not on file    Unstable Housing in the Last Year: Not on file         ALLERGIES: Patient has no known allergies. Review of Systems   Unable to perform ROS: Patient nonverbal       There were no vitals filed for this visit. Physical Exam  Vitals and nursing note reviewed. HENT:      Head: Normocephalic and atraumatic. Nose: Nose normal.      Mouth/Throat:      Mouth: Mucous membranes are moist.   Eyes:      General: No scleral icterus. Cardiovascular:      Rate and Rhythm: Normal rate. Abdominal:      General: Abdomen is flat. Musculoskeletal:         General: No deformity. Skin:     General: Skin is warm. Capillary Refill: Capillary refill takes less than 2 seconds. Neurological:      Mental Status: She is alert. Comments: Does not follow commands, weakness throughout, facial droop   Psychiatric:      Comments: Unable to assess          [facility]  ED Course as of 07/05/22 1421   Tue Jul 05, 2022   1245 EKG 1216  Normal sinus rhythm at 78 bpm with a left axis. Incomplete bundle branch block pattern.   No STEMI [GG]      ED Course User Index  [GG] Sridhar Rangel DO     VITAL SIGNS:  Patient Vitals for the past 4 hrs:   Temp Pulse Resp BP SpO2 07/05/22 1315 -- -- -- -- 98 %   07/05/22 1150 98.1 °F (36.7 °C) 81 14 (!) 157/60 98 %         LABS:  Recent Results (from the past 6 hour(s))   CBC WITH AUTOMATED DIFF    Collection Time: 07/05/22 12:00 PM   Result Value Ref Range    WBC 3.3 (L) 3.6 - 11.0 K/uL    RBC 3.73 (L) 3.80 - 5.20 M/uL    HGB 10.7 (L) 11.5 - 16.0 g/dL    HCT 34.2 (L) 35.0 - 47.0 %    MCV 91.7 80.0 - 99.0 FL    MCH 28.7 26.0 - 34.0 PG    MCHC 31.3 30.0 - 36.5 g/dL    RDW 15.2 (H) 11.5 - 14.5 %    PLATELET 703 380 - 636 K/uL    MPV 8.7 (L) 8.9 - 12.9 FL    NRBC 0.0 0  WBC    ABSOLUTE NRBC 0.00 0.00 - 0.01 K/uL    NEUTROPHILS 59 32 - 75 %    LYMPHOCYTES 28 12 - 49 %    MONOCYTES 10 5 - 13 %    EOSINOPHILS 2 0 - 7 %    BASOPHILS 1 0 - 1 %    IMMATURE GRANULOCYTES 0 0.0 - 0.5 %    ABS. NEUTROPHILS 2.0 1.8 - 8.0 K/UL    ABS. LYMPHOCYTES 0.9 0.8 - 3.5 K/UL    ABS. MONOCYTES 0.3 0.0 - 1.0 K/UL    ABS. EOSINOPHILS 0.1 0.0 - 0.4 K/UL    ABS. BASOPHILS 0.0 0.0 - 0.1 K/UL    ABS. IMM. GRANS. 0.0 0.00 - 0.04 K/UL    DF SMEAR SCANNED      RBC COMMENTS NORMOCYTIC, NORMOCHROMIC     METABOLIC PANEL, COMPREHENSIVE    Collection Time: 07/05/22 12:00 PM   Result Value Ref Range    Sodium 141 136 - 145 mmol/L    Potassium 3.9 3.5 - 5.1 mmol/L    Chloride 109 (H) 97 - 108 mmol/L    CO2 32 21 - 32 mmol/L    Anion gap 0 (L) 5 - 15 mmol/L    Glucose 116 (H) 65 - 100 mg/dL    BUN 14 6 - 20 MG/DL    Creatinine 0.90 0.55 - 1.02 MG/DL    BUN/Creatinine ratio 16 12 - 20      GFR est AA >60 >60 ml/min/1.73m2    GFR est non-AA 59 (L) >60 ml/min/1.73m2    Calcium 9.1 8.5 - 10.1 MG/DL    Bilirubin, total 0.5 0.2 - 1.0 MG/DL    ALT (SGPT) 7 (L) 12 - 78 U/L    AST (SGOT) 17 15 - 37 U/L    Alk.  phosphatase 126 (H) 45 - 117 U/L    Protein, total 7.1 6.4 - 8.2 g/dL    Albumin 3.2 (L) 3.5 - 5.0 g/dL    Globulin 3.9 2.0 - 4.0 g/dL    A-G Ratio 0.8 (L) 1.1 - 2.2     PROTHROMBIN TIME + INR    Collection Time: 07/05/22 12:00 PM   Result Value Ref Range    INR 1.1 0.9 - 1.1 Prothrombin time 11.0 9.0 - 11.1 sec   TROPONIN-HIGH SENSITIVITY    Collection Time: 07/05/22 12:00 PM   Result Value Ref Range    Troponin-High Sensitivity 7 0 - 51 ng/L   SAMPLES BEING HELD    Collection Time: 07/05/22 12:00 PM   Result Value Ref Range    SAMPLES BEING HELD 1RED     COMMENT        Add-on orders for these samples will be processed based on acceptable specimen integrity and analyte stability, which may vary by analyte. IMAGING:  XR CHEST PORT   Final Result   No acute cardiopulmonary abnormality. CTA CODE NEURO HEAD AND NECK W CONT   Final Result   1. Chronic left distal ICA dissection without flow limitation or associated   thrombus. 2.  No intracranial large vessel occlusion. 3.  Bilateral subdural hematomas right greater than left. 4.  No perfusion abnormality. CT CODE NEURO PERF W CBF   Final Result   1. Chronic left distal ICA dissection without flow limitation or associated   thrombus. 2.  No intracranial large vessel occlusion. 3.  Bilateral subdural hematomas right greater than left. 4.  No perfusion abnormality. CT CODE NEURO HEAD WO CONTRAST   Final Result   Redemonstrated bilateral right-larger-than-left acute on chronic subdural   hematomas, slightly decreased compared to prior. Stable mass effect upon the   right cerebral hemisphere and 5 mm leftward midline shift. Medications During Visit:  Medications   iopamidoL (ISOVUE-370) 76 % injection 100 mL (100 mL IntraVENous Given 7/5/22 1210)   iopamidoL (ISOVUE-370) 76 % injection 20 mL (20 mL IntraVENous Given 7/5/22 1209)         DECISION MAKING:  Angela Oglesby is a 80 y.o. female who comes in as above. Patient arrives somnolent with left eye gaze concern for stroke. Apparently shortly thereafter this subsided and she developed facial droop and slurred speech which then resolved and now is back to baseline.   Spoke with the patient's daughter who states things like this have happened in the past when she has not gotten her parkinsonian type medicines. Family notes that she may not have gotten over the last several days. Currently, patient at baseline. I spoke with teleneurology who was concern for seizure-like activity and recommended admission with MRI and further work-up      IMPRESSION:  1. Stroke-like symptoms    2. History of seizure        DISPOSITION:  Admitted        Procedures    Perfect Serve Consult for Admission  2:21 PM    ED Room Number: QA45/78  Patient Name and age:  Harjinder Schmidt 80 y.o.  female  Working Diagnosis:   1. Stroke-like symptoms    2. History of seizure        COVID-19 Suspicion:  no  Sepsis present:  no  Reassessment needed: no  Code Status:  Full Code  Readmission: no  Isolation Requirements:  no  Recommended Level of Care:  telemetry  Department:[facility] Adult ED - 21   Other:  Came in as a level 1 stroke, had general weakness and eye gaze to left. That resolved and she had facial droop and slurred speech. Now back to base line. Tele neuro has seen - thought it may have been a seizure. rec admission with MRI and neuro to see for seizure med evaluation.

## 2022-07-06 ENCOUNTER — APPOINTMENT (OUTPATIENT)
Dept: VASCULAR SURGERY | Age: 87
DRG: 064 | End: 2022-07-06
Attending: INTERNAL MEDICINE
Payer: MEDICARE

## 2022-07-06 ENCOUNTER — APPOINTMENT (OUTPATIENT)
Dept: MRI IMAGING | Age: 87
DRG: 064 | End: 2022-07-06
Attending: FAMILY MEDICINE
Payer: MEDICARE

## 2022-07-06 LAB
ALBUMIN SERPL-MCNC: 3.4 G/DL (ref 3.5–5)
ALBUMIN/GLOB SERPL: 1.1 {RATIO} (ref 1.1–2.2)
ALP SERPL-CCNC: 122 U/L (ref 45–117)
ALT SERPL-CCNC: <6 U/L (ref 12–78)
ANION GAP SERPL CALC-SCNC: 5 MMOL/L (ref 5–15)
ANION GAP SERPL CALC-SCNC: 8 MMOL/L (ref 5–15)
AST SERPL-CCNC: 19 U/L (ref 15–37)
ATRIAL RATE: 86 BPM
BASOPHILS # BLD: 0 K/UL (ref 0–0.1)
BASOPHILS # BLD: 0 K/UL (ref 0–0.1)
BASOPHILS NFR BLD: 1 % (ref 0–1)
BASOPHILS NFR BLD: 1 % (ref 0–1)
BILIRUB SERPL-MCNC: 0.6 MG/DL (ref 0.2–1)
BUN SERPL-MCNC: 12 MG/DL (ref 6–20)
BUN SERPL-MCNC: 15 MG/DL (ref 6–20)
BUN/CREAT SERPL: 17 (ref 12–20)
BUN/CREAT SERPL: 18 (ref 12–20)
CALCIUM SERPL-MCNC: 8.8 MG/DL (ref 8.5–10.1)
CALCIUM SERPL-MCNC: 9.3 MG/DL (ref 8.5–10.1)
CALCULATED P AXIS, ECG09: 71 DEGREES
CALCULATED R AXIS, ECG10: -7 DEGREES
CALCULATED T AXIS, ECG11: 109 DEGREES
CHLORIDE SERPL-SCNC: 105 MMOL/L (ref 97–108)
CHLORIDE SERPL-SCNC: 107 MMOL/L (ref 97–108)
CO2 SERPL-SCNC: 27 MMOL/L (ref 21–32)
CO2 SERPL-SCNC: 29 MMOL/L (ref 21–32)
CREAT SERPL-MCNC: 0.68 MG/DL (ref 0.55–1.02)
CREAT SERPL-MCNC: 0.86 MG/DL (ref 0.55–1.02)
DIAGNOSIS, 93000: NORMAL
DIFFERENTIAL METHOD BLD: ABNORMAL
DIFFERENTIAL METHOD BLD: ABNORMAL
EOSINOPHIL # BLD: 0 K/UL (ref 0–0.4)
EOSINOPHIL # BLD: 0.1 K/UL (ref 0–0.4)
EOSINOPHIL NFR BLD: 1 % (ref 0–7)
EOSINOPHIL NFR BLD: 3 % (ref 0–7)
ERYTHROCYTE [DISTWIDTH] IN BLOOD BY AUTOMATED COUNT: 15 % (ref 11.5–14.5)
ERYTHROCYTE [DISTWIDTH] IN BLOOD BY AUTOMATED COUNT: 15.1 % (ref 11.5–14.5)
GLOBULIN SER CALC-MCNC: 3.2 G/DL (ref 2–4)
GLUCOSE SERPL-MCNC: 124 MG/DL (ref 65–100)
GLUCOSE SERPL-MCNC: 78 MG/DL (ref 65–100)
HCT VFR BLD AUTO: 34.1 % (ref 35–47)
HCT VFR BLD AUTO: 34.3 % (ref 35–47)
HGB BLD-MCNC: 10.9 G/DL (ref 11.5–16)
HGB BLD-MCNC: 11 G/DL (ref 11.5–16)
IMM GRANULOCYTES # BLD AUTO: 0 K/UL (ref 0–0.04)
IMM GRANULOCYTES # BLD AUTO: 0 K/UL (ref 0–0.04)
IMM GRANULOCYTES NFR BLD AUTO: 0 % (ref 0–0.5)
IMM GRANULOCYTES NFR BLD AUTO: 0 % (ref 0–0.5)
INR PPP: 1.1 (ref 0.9–1.1)
LYMPHOCYTES # BLD: 0.8 K/UL (ref 0.8–3.5)
LYMPHOCYTES # BLD: 1.1 K/UL (ref 0.8–3.5)
LYMPHOCYTES NFR BLD: 28 % (ref 12–49)
LYMPHOCYTES NFR BLD: 35 % (ref 12–49)
MAGNESIUM SERPL-MCNC: 2.3 MG/DL (ref 1.6–2.4)
MCH RBC QN AUTO: 28.9 PG (ref 26–34)
MCH RBC QN AUTO: 28.9 PG (ref 26–34)
MCHC RBC AUTO-ENTMCNC: 32 G/DL (ref 30–36.5)
MCHC RBC AUTO-ENTMCNC: 32.1 G/DL (ref 30–36.5)
MCV RBC AUTO: 90 FL (ref 80–99)
MCV RBC AUTO: 90.5 FL (ref 80–99)
MONOCYTES # BLD: 0.3 K/UL (ref 0–1)
MONOCYTES # BLD: 0.3 K/UL (ref 0–1)
MONOCYTES NFR BLD: 11 % (ref 5–13)
MONOCYTES NFR BLD: 9 % (ref 5–13)
NEUTS SEG # BLD: 1.6 K/UL (ref 1.8–8)
NEUTS SEG # BLD: 1.8 K/UL (ref 1.8–8)
NEUTS SEG NFR BLD: 50 % (ref 32–75)
NEUTS SEG NFR BLD: 61 % (ref 32–75)
NRBC # BLD: 0 K/UL (ref 0–0.01)
NRBC # BLD: 0 K/UL (ref 0–0.01)
NRBC BLD-RTO: 0 PER 100 WBC
NRBC BLD-RTO: 0 PER 100 WBC
P-R INTERVAL, ECG05: 172 MS
PLATELET # BLD AUTO: 191 K/UL (ref 150–400)
PLATELET # BLD AUTO: 218 K/UL (ref 150–400)
PMV BLD AUTO: 8.9 FL (ref 8.9–12.9)
PMV BLD AUTO: 9.6 FL (ref 8.9–12.9)
POTASSIUM SERPL-SCNC: 3.7 MMOL/L (ref 3.5–5.1)
POTASSIUM SERPL-SCNC: 3.8 MMOL/L (ref 3.5–5.1)
PROT SERPL-MCNC: 6.6 G/DL (ref 6.4–8.2)
PROTHROMBIN TIME: 11.3 SEC (ref 9–11.1)
Q-T INTERVAL, ECG07: 426 MS
QRS DURATION, ECG06: 118 MS
QTC CALCULATION (BEZET), ECG08: 509 MS
RBC # BLD AUTO: 3.77 M/UL (ref 3.8–5.2)
RBC # BLD AUTO: 3.81 M/UL (ref 3.8–5.2)
RBC MORPH BLD: ABNORMAL
SODIUM SERPL-SCNC: 140 MMOL/L (ref 136–145)
SODIUM SERPL-SCNC: 141 MMOL/L (ref 136–145)
TROPONIN-HIGH SENSITIVITY: 10 NG/L (ref 0–51)
VENTRICULAR RATE, ECG03: 86 BPM
WBC # BLD AUTO: 2.9 K/UL (ref 3.6–11)
WBC # BLD AUTO: 3.1 K/UL (ref 3.6–11)

## 2022-07-06 PROCEDURE — 95816 EEG AWAKE AND DROWSY: CPT | Performed by: INTERNAL MEDICINE

## 2022-07-06 PROCEDURE — 70551 MRI BRAIN STEM W/O DYE: CPT

## 2022-07-06 PROCEDURE — 93005 ELECTROCARDIOGRAM TRACING: CPT

## 2022-07-06 PROCEDURE — 99222 1ST HOSP IP/OBS MODERATE 55: CPT | Performed by: PSYCHIATRY & NEUROLOGY

## 2022-07-06 PROCEDURE — 85025 COMPLETE CBC W/AUTO DIFF WBC: CPT

## 2022-07-06 PROCEDURE — 85610 PROTHROMBIN TIME: CPT

## 2022-07-06 PROCEDURE — 77030019905 HC CATH URETH INTMIT MDII -A

## 2022-07-06 PROCEDURE — 74011250636 HC RX REV CODE- 250/636: Performed by: INTERNAL MEDICINE

## 2022-07-06 PROCEDURE — 93970 EXTREMITY STUDY: CPT

## 2022-07-06 PROCEDURE — 36415 COLL VENOUS BLD VENIPUNCTURE: CPT

## 2022-07-06 PROCEDURE — 80053 COMPREHEN METABOLIC PANEL: CPT

## 2022-07-06 PROCEDURE — 74011250637 HC RX REV CODE- 250/637: Performed by: FAMILY MEDICINE

## 2022-07-06 PROCEDURE — 83735 ASSAY OF MAGNESIUM: CPT

## 2022-07-06 PROCEDURE — 65270000046 HC RM TELEMETRY

## 2022-07-06 PROCEDURE — 74011250637 HC RX REV CODE- 250/637: Performed by: INTERNAL MEDICINE

## 2022-07-06 PROCEDURE — 84484 ASSAY OF TROPONIN QUANT: CPT

## 2022-07-06 RX ORDER — MORPHINE SULFATE 2 MG/ML
2 INJECTION, SOLUTION INTRAMUSCULAR; INTRAVENOUS
Status: DISCONTINUED | OUTPATIENT
Start: 2022-07-06 | End: 2022-07-08 | Stop reason: HOSPADM

## 2022-07-06 RX ORDER — MAG HYDROX/ALUMINUM HYD/SIMETH 200-200-20
30 SUSPENSION, ORAL (FINAL DOSE FORM) ORAL
Status: DISCONTINUED | OUTPATIENT
Start: 2022-07-06 | End: 2022-07-08 | Stop reason: HOSPADM

## 2022-07-06 RX ORDER — HYDRALAZINE HYDROCHLORIDE 20 MG/ML
10 INJECTION INTRAMUSCULAR; INTRAVENOUS
Status: DISCONTINUED | OUTPATIENT
Start: 2022-07-06 | End: 2022-07-08 | Stop reason: HOSPADM

## 2022-07-06 RX ORDER — ACETAMINOPHEN 325 MG/1
650 TABLET ORAL
Status: DISCONTINUED | OUTPATIENT
Start: 2022-07-06 | End: 2022-07-08 | Stop reason: HOSPADM

## 2022-07-06 RX ORDER — ONDANSETRON 2 MG/ML
4 INJECTION INTRAMUSCULAR; INTRAVENOUS
Status: DISCONTINUED | OUTPATIENT
Start: 2022-07-06 | End: 2022-07-08 | Stop reason: HOSPADM

## 2022-07-06 RX ORDER — LEVETIRACETAM 500 MG/1
1000 TABLET ORAL 2 TIMES DAILY
Status: DISCONTINUED | OUTPATIENT
Start: 2022-07-06 | End: 2022-07-08 | Stop reason: HOSPADM

## 2022-07-06 RX ORDER — POLYETHYLENE GLYCOL 3350 17 G/17G
17 POWDER, FOR SOLUTION ORAL DAILY
Status: DISCONTINUED | OUTPATIENT
Start: 2022-07-07 | End: 2022-07-08 | Stop reason: HOSPADM

## 2022-07-06 RX ORDER — AMLODIPINE BESYLATE 5 MG/1
5 TABLET ORAL DAILY
Status: DISCONTINUED | OUTPATIENT
Start: 2022-07-06 | End: 2022-07-08 | Stop reason: HOSPADM

## 2022-07-06 RX ADMIN — CARBIDOPA AND LEVODOPA 1.5 TABLET: 25; 100 TABLET ORAL at 10:03

## 2022-07-06 RX ADMIN — ATORVASTATIN CALCIUM 10 MG: 10 TABLET, FILM COATED ORAL at 21:49

## 2022-07-06 RX ADMIN — AMLODIPINE BESYLATE 5 MG: 5 TABLET ORAL at 12:46

## 2022-07-06 RX ADMIN — BUSPIRONE HYDROCHLORIDE 5 MG: 10 TABLET ORAL at 10:10

## 2022-07-06 RX ADMIN — DONEPEZIL HYDROCHLORIDE 5 MG: 5 TABLET, FILM COATED ORAL at 21:49

## 2022-07-06 RX ADMIN — QUETIAPINE FUMARATE 25 MG: 25 TABLET ORAL at 21:49

## 2022-07-06 RX ADMIN — DOCUSATE SODIUM 50 MG AND SENNOSIDES 8.6 MG 1 TABLET: 8.6; 5 TABLET, FILM COATED ORAL at 17:31

## 2022-07-06 RX ADMIN — CARBIDOPA AND LEVODOPA 1.5 TABLET: 25; 100 TABLET ORAL at 17:31

## 2022-07-06 RX ADMIN — ALUMINUM HYDROXIDE, MAGNESIUM HYDROXIDE, AND SIMETHICONE 30 ML: 200; 200; 20 SUSPENSION ORAL at 14:09

## 2022-07-06 RX ADMIN — SERTRALINE HYDROCHLORIDE 50 MG: 50 TABLET ORAL at 17:32

## 2022-07-06 RX ADMIN — LEVETIRACETAM 500 MG: 500 TABLET, FILM COATED ORAL at 10:04

## 2022-07-06 RX ADMIN — PANTOPRAZOLE SODIUM 20 MG: 20 TABLET, DELAYED RELEASE ORAL at 10:04

## 2022-07-06 RX ADMIN — LEVETIRACETAM 1000 MG: 500 TABLET, FILM COATED ORAL at 21:49

## 2022-07-06 RX ADMIN — BUSPIRONE HYDROCHLORIDE 5 MG: 10 TABLET ORAL at 17:31

## 2022-07-06 RX ADMIN — QUETIAPINE FUMARATE 12.5 MG: 25 TABLET ORAL at 10:04

## 2022-07-06 RX ADMIN — HYDRALAZINE HYDROCHLORIDE 10 MG: 20 INJECTION INTRAMUSCULAR; INTRAVENOUS at 21:49

## 2022-07-06 RX ADMIN — MULTIPLE VITAMINS W/ MINERALS TAB 1 TABLET: TAB at 10:04

## 2022-07-06 NOTE — CONSULTS
NEUROLOGY   INPATIENT EVALUATION/CONSULTATION       PATIENT NAME: Roselyn Hopkins    MRN: [id number]    REASON FOR CONSULTATION: Facial droop, gaze deviation, behavioral arrest    07/06/22      HISTORY OF PRESENT ILLNESS:  Roselyn Hopkins is a 80 y.o. right-hand-dominant female with history of dementia, known bihemispheric subdural hematomas, seizure disorder anxiety who presented to [facility] from her facility with reports of multiple symptoms over the past few days. Patient has been noted to have episodes of staring she also had noted worsening left facial droop, some degree of left gaze deviation. Was admitted as a stroke alert, no significant findings were noted on initial evaluation. Repeat imaging demonstrates persistent but improved bilateral subdural hematomas. In the chart patient's daughter mentions that she does not always take her seizure medicine/other medications at her nursing facility. MRI did not demonstrate any ischemia.     PAST MEDICAL HISTORY:  Past Medical History:   Diagnosis Date    Anemia     Dementia ([address]     GERD (gastroesophageal reflux disease)     Heart failure (HCC)     Hypertension     Neurological disorder     parkinsons, seizure, nontraumatic subacute subdural hemorrhage    Orthostatic hypotension     Psychiatric disorder     anxiety    Seizures ([facility] [address]        PAST SURGICAL HISTORY:  Past Surgical History:   Procedure Laterality Date    HX GYN      hysterectomy    HX HEENT      catacacts removed    KS BREAST SURGERY PROCEDURE UNLISTED      l breast 2 cyst       FAMILY HISTORY:   Family History   Problem Relation Age of Onset    Stroke Mother     Heart Disease Father          SOCIAL HISTORY:  Social History     Socioeconomic History    Marital status:    Tobacco Use    Smoking status: Never Smoker   Substance and Sexual Activity    Alcohol use: Yes     Comment: occasional    Drug use: No         MEDICATIONS:   Current Facility-Administered Medications   Medication Dose Route Frequency Provider Last Rate Last Admin    amLODIPine (NORVASC) tablet 5 mg  5 mg Oral DAILY Basilio ALONSO MD   5 mg at 07/06/22 1246    hydrALAZINE (APRESOLINE) 20 mg/mL injection 10 mg  10 mg IntraVENous Q6H PRN Basilio Ellsworth MD        ondansetron [facility]) injection 4 mg  4 mg IntraVENous Q8H PRN Basilio Ellsworth MD        acetaminophen (TYLENOL) tablet 650 mg  650 mg Oral Q4H PRN Basilio Ellsworth MD        levETIRAcetam (KEPPRA) tablet 1,000 mg  1,000 mg Oral BID Basilio Ellsworth MD        alum-mag [facility]) oral suspension 30 mL  30 mL Oral Q4H PRN Basilio ALONSO MD   30 mL at 07/06/22 1409    morphine injection 2 mg  2 mg IntraVENous Q4H PRN Basilio ALONSO MD        [START ON 7/7/2022] polyethylene glycol (MIRALAX) packet 17 g  17 g Oral DAILY Maya Sotomayor MD        busPIRone (BUSPAR) tablet 5 mg  5 mg Oral TID WITH MEALS [place] MD   5 mg at 07/06/22 1731    donepeziL (ARICEPT) tablet 5 mg  5 mg Oral QHS Patti Thompson MD   5 mg at 07/05/22 2231    multivitamin, tx-iron-ca-min (THERA-M w/ IRON) tablet 1 Tablet  1 Tablet Oral DAILY Patti Thompson MD   1 Tablet at 07/06/22 1004    pantoprazole (PROTONIX) tablet 20 mg  20 mg Oral DAILY Patti Thompson MD   20 mg at 07/06/22 1004    sertraline (ZOLOFT) tablet 50 mg  50 mg Oral QPM Patti Thompson MD   50 mg at 07/06/22 1732    atorvastatin (LIPITOR) tablet 10 mg  10 mg Oral QHS Patti Thompson MD   10 mg at 07/05/22 2231    senna-docusate (PERICOLACE) 8.6-50 mg per tablet 1 Tablet  1 Tablet Oral BID Patti Thompson MD   1 Tablet at 07/06/22 1731    carbidopa-levodopa (SINEMET)  mg per tablet 1.5 Tablet  1.5 Tablet Oral BIDPC Patti Thompson MD   1.5 Tablet at 07/06/22 3159    QUEtiapine (SEROquel) tablet 12.5 mg  12.5 mg Oral QAM Patti Thompson MD   12.5 mg at 07/06/22 1003  QUEtiapine (SEROquel) tablet 25 mg  25 mg Oral QHS Cande Carrillo MD   25 mg at 07/05/22 1666         ALLERGIES:  No Known Allergies      REVIEW OF SYSTEMS:  10 point ROS reviewed with patient and negative except for those listed above. PHYSICAL EXAM:  Vital Signs:   Visit Vitals  BP (!) 151/94   Pulse 81   Temp 98.2 °F (36.8 °C)   Resp 15   Ht 5' 1\" (1.549 m)   Wt 106 lb 11.2 oz (48.4 kg)   SpO2 100%   BMI 20.16 kg/m²     Elderly female laying in bed complaining of being held captive against her will. HEENT is unremarkable neck appears supple. Cardiopulmonary exams are unrevealing. Abdomen is nondistended. Extremities are warm/dry. Neurologically, patient is oriented to self knows she is at [facility]. States the month is January. Does not answer question regarding year. Attention is impaired to cause conversation. Speech is minimal and output but clear. Language nonfluent she has trouble with basic commands. Cranials 2 through 12 are notable for left facial weakness more prominent at rest and with activation. Motorically patient has near symmetric strength in upper and lower extremities grossly 4 to 4+ out of 5. Sensation appears grossly intact to fine touch. Coordination is reasonably intact upper extremities. Remainder of examination is deferred. PERTINENT DATA:    CT Results (maximum last 3): Results from [place] encounter on 07/05/22    CT CODE NEURO PERF W CBF    Narrative  CLINICAL HISTORY: Code stroke. Altered mental status. EXAMINATION:  CT ANGIOGRAPHY HEAD AND NECK, CT PERFUSION    COMPARISON: June 8, 2022    TECHNIQUE:  Following the uneventful administration of iodinated contrast  material, axial CT angiography of the head and neck was performed. Delayed axial  images through the head were also obtained. Coronal and sagittal reconstructions  were obtained. Manual postprocessing of images was performed.  3-D  Sagittal  maximal intensity projection images were obtained. 3-D Coronal maximal  intensity projections were obtained. CT brain perfusion was performed with  generation of hemodynamic maps of multiple parameters, including cerebral blood  flow, cerebral blood volume, mean transit time, and TMAX. CT dose reduction was  achieved through use of a standardized protocol tailored for this examination  and automatic exposure control for dose modulation. This study was analyzed by  the [address] ai algorithm. FINDINGS:    Delayed contrast-enhanced head CT:    Stable bilateral subdural hematomas, mixed density. Gyral enhancement in the  right parietal lobe is likely reactive. Periventricular white matter  hypodensities indicative of chronic microvascular angiopathy. CTA NECK:    Great vessels: Normal arch anatomy with the origins patent. Right subclavian artery: Patent    Left subclavian artery: Patent    Right common carotid artery: Patent    Left common carotid artery: Patent    Cervical right internal carotid artery: Patent with no significant stenosis by  NASCET criteria. Cervical left internal carotid artery: Patent with no significant stenosis by  NASCET criteria. There is a chronic-appearing dissection of the left distal  cervical ICA without flow limitation or associated thrombus. Right vertebral artery: Patent    Left vertebral artery: Patent    Lung apices are clear. Subcentimeter thyroid nodules bilaterally. No cervical  lymphadenopathy. CTA HEAD:    Right cavernous internal carotid artery: Patent    Left cavernous internal carotid artery: Patent    Anterior cerebral arteries: Patent    Anterior communicating artery: Patent    Right middle cerebral artery: Patent    Left middle cerebral artery: Patent    Posterior communicating arteries: Patent    Posterior cerebral arteries: Patent    Basilar artery: Patent    Distal vertebral arteries: Patent    No evidence for intracranial aneurysm or hemodynamically significant stenosis.     CT Perfusion:  Normal CT perfusion. Impression  1. Chronic left distal ICA dissection without flow limitation or associated  thrombus. 2.  No intracranial large vessel occlusion. 3.  Bilateral subdural hematomas right greater than left. 4.  No perfusion abnormality. CTA CODE NEURO HEAD AND NECK W CONT    Narrative  CLINICAL HISTORY: Code stroke. Altered mental status. EXAMINATION:  CT ANGIOGRAPHY HEAD AND NECK, CT PERFUSION    COMPARISON: June 8, 2022    TECHNIQUE:  Following the uneventful administration of iodinated contrast  material, axial CT angiography of the head and neck was performed. Delayed axial  images through the head were also obtained. Coronal and sagittal reconstructions  were obtained. Manual postprocessing of images was performed. 3-D  Sagittal  maximal intensity projection images were obtained. 3-D Coronal maximal  intensity projections were obtained. CT brain perfusion was performed with  generation of hemodynamic maps of multiple parameters, including cerebral blood  flow, cerebral blood volume, mean transit time, and TMAX. CT dose reduction was  achieved through use of a standardized protocol tailored for this examination  and automatic exposure control for dose modulation. This study was analyzed by  the [address] ai algorithm. FINDINGS:    Delayed contrast-enhanced head CT:    Stable bilateral subdural hematomas, mixed density. Gyral enhancement in the  right parietal lobe is likely reactive. Periventricular white matter  hypodensities indicative of chronic microvascular angiopathy. CTA NECK:    Great vessels: Normal arch anatomy with the origins patent. Right subclavian artery: Patent    Left subclavian artery: Patent    Right common carotid artery: Patent    Left common carotid artery: Patent    Cervical right internal carotid artery: Patent with no significant stenosis by  NASCET criteria. Cervical left internal carotid artery: Patent with no significant stenosis by  NASCET criteria. There is a chronic-appearing dissection of the left distal  cervical ICA without flow limitation or associated thrombus. Right vertebral artery: Patent    Left vertebral artery: Patent    Lung apices are clear. Subcentimeter thyroid nodules bilaterally. No cervical  lymphadenopathy. CTA HEAD:    Right cavernous internal carotid artery: Patent    Left cavernous internal carotid artery: Patent    Anterior cerebral arteries: Patent    Anterior communicating artery: Patent    Right middle cerebral artery: Patent    Left middle cerebral artery: Patent    Posterior communicating arteries: Patent    Posterior cerebral arteries: Patent    Basilar artery: Patent    Distal vertebral arteries: Patent    No evidence for intracranial aneurysm or hemodynamically significant stenosis. CT Perfusion:  Normal CT perfusion. Impression  1. Chronic left distal ICA dissection without flow limitation or associated  thrombus. 2.  No intracranial large vessel occlusion. 3.  Bilateral subdural hematomas right greater than left. 4.  No perfusion abnormality. CT CODE NEURO HEAD WO CONTRAST    Narrative  EXAM: CT CODE NEURO HEAD WO CONTRAST    INDICATION: Code Stroke    COMPARISON: CT head 6/8/2022. CONTRAST: None. TECHNIQUE: Unenhanced CT of the head was performed using 5 mm images. Brain and  bone windows were generated. Coronal and sagittal reformats. CT dose reduction  was achieved through use of a standardized protocol tailored for this  examination and automatic exposure control for dose modulation. FINDINGS:  Extensive bilateral acute on chronic subdural hematomas again seen measuring up  to 10 mm on the right and 3 mm on the left (previously 11 mm and 5 mm,  respectively). Mass effect upon the right cerebral hemisphere is similar to  prior. Stable 5 mm leftward midline shift. Generalized volume loss. No CT  evidence of acute infarct.  Scattered white matter hypodensities may reflect  chronic microangiopathic change. The bone windows demonstrate no acute abnormalities. Chronic heterotopic  ossification along the right frontoparietal convexity. The visualized portions  of the paranasal sinuses and mastoid air cells are clear. Bilateral lens  replacements. Impression  Redemonstrated bilateral right-larger-than-left acute on chronic subdural  hematomas, slightly decreased compared to prior. Stable mass effect upon the  right cerebral hemisphere and 5 mm leftward midline shift. MRI Results (maximum last 3): Results from [facility] encounter on 07/05/22    MRI BRAIN WO CONT    Narrative  EXAM: MRI BRAIN WO CONT    TECHNIQUE: Brain images including sagittal and axial T1-weighted, axial FLAIR,  T2-weighted, diffusion weighted, gradient echo,  susceptibility weighted,  coronal T2    IV CONTRAST:  None    INDICATION:  LEFT EYE GAZE, FACIAL DROOP/SLURRED SPEECH/ H/O SEIZURE    COMPARISON:  CT and CT angiogram of 7/5/2022    FINDINGS:  BRAIN PARENCHYMA:  No acute infarct. Extensive predominantly confluent FLAIR/T2  hyperintensity in the cerebral white matter, consistent with intracranial small  vessel disease. Chronic left cerebellar lacunar infarct. Focal vasogenic edema  and patchy areas of subacute hemorrhage in the right inferior parietal lobe  suggesting contusion. INTRACRANIAL HEMORRHAGE: None. No change in subacute to chronic right  frontoparietal extra-axial collection which contains a fluid fluid level,  consistent with recent acute hemorrhage. Tiny subacute to chronic left subdural  hematoma, unchanged. CSF SPACES:  No hydrocephalus. Mild mass effect on the right lateral ventricle  because of the right subdural hemorrhage. BASAL CISTERNS:  Patent. MIDLINE SHIFT: None significant. VASCULAR SYSTEM:  Normal flow voids. PARANASAL SINUSES AND MASTOID AIR CELLS:  No significant opacification. VISUALIZED ORBITS:  No significant abnormalities. Bilateral lens replacements.   VISUALIZED UPPER CERVICAL SPINE:  No significant abnormalities. SELLA:  No enlargement or  focal abnormality. SKULL BASE:  No significant abnormalities. Cerebellar tonsils in normal  position. CALVARIUM:  Intact. Impression  1. No acute infarction. 2. Probable subacute contusion in the right inferior parietal lobe. 3. Bilateral subacute to chronic extra-axial collections, right larger than  left, with superimposed small amount of acute hemorrhage in the right  collection. Stable size when compared to CT obtained on admission and on  6/6/2022. Results from [place] encounter on 02/23/19    MRI BRAIN WO CONT    Narrative  EXAM:  MRI BRAIN WO CONT, MRA NECK WO CONT, MRA BRAIN WO CONT    INDICATION:  Right upper extremity weakness. Intermittent dizziness. Parkinson's  disease. COMPARISON: None. TECHNIQUE: Multisequence, multiplanar MRI of the brain without contrast.  Noncontrast time of flight MR angiography of the head. Noncontrast  time-of-flight MR angiography of the neck. (3 separate studies reported  together) Maximum intensity projection reformats of the MR angiography. Examination  performed on the 3 Parul magnet. FINDINGS: MRI Rafael Hennessy: Multiple small areas of restricted diffusion in the  posterior left MCA territory involving the left parietal lobe and postcentral  gyrus. There is associated hyperintense T2/flair signal in these areas of  restricted diffusion. No other restricted diffusion. Moderate chronic microvascular ischemic disease in the cerebral white matter. No hydrocephalus. No mass effect or midline shift. No extra-axial fluid  collection. The midline structures, including the cervicomedullary junction, are within  normal limits. Brain MRA:  The vertebral arteries are codominant. The basilar artery and its  branches are normal. There is dissection in the distal left internal carotid  artery, just proximal to the petrous portion. Distal right internal carotid  artery is patent. Anterior and middle cerebral arteries are patent. . There is no  flow-limiting intracranial stenosis. There is no aneurysm. There are no sizable  posterior communicating arteries. Neck MRA: there is no flow-limiting stenosis. Bilateral common carotid arteries  are patent bilateral carotid bifurcations are patent. Distal left internal  carotid artery dissection is obscured by artifact. Right internal carotid artery  is patent. All measurements utilize NASCET criteria. Impression  IMPRESSION:  1. 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 2. Moderate chronic microvascular ischemic disease. 3. Focal dissection of the distal left internal carotid artery proximal to the  petrous portion. 4. Limited MR angiography neck due to technique. Recommendation: CT angiography head and neck. I discussed this impression with BARRY Alan at 1049 hours on 2/24/2019. MRA BRAIN WO CONT    Narrative  EXAM:  MRI BRAIN WO CONT, MRA NECK WO CONT, MRA BRAIN WO CONT    INDICATION:  Right upper extremity weakness. Intermittent dizziness. Parkinson's  disease. COMPARISON: None. TECHNIQUE: Multisequence, multiplanar MRI of the brain without contrast.  Noncontrast time of flight MR angiography of the head. Noncontrast  time-of-flight MR angiography of the neck. (3 separate studies reported  together) Maximum intensity projection reformats of the MR angiography. Examination  performed on the 3 Parul magnet. FINDINGS: MRI Leeta Mini: Multiple small areas of restricted diffusion in the  posterior left MCA territory involving the left parietal lobe and postcentral  gyrus. There is associated hyperintense T2/flair signal in these areas of  restricted diffusion. No other restricted diffusion. Moderate chronic microvascular ischemic disease in the cerebral white matter. No hydrocephalus. No mass effect or midline shift. No extra-axial fluid  collection.     The midline structures, including the cervicomedullary junction, are within  normal limits. Brain MRA:  The vertebral arteries are codominant. The basilar artery and its  branches are normal. There is dissection in the distal left internal carotid  artery, just proximal to the petrous portion. Distal right internal carotid  artery is patent. Anterior and middle cerebral arteries are patent. . There is no  flow-limiting intracranial stenosis. There is no aneurysm. There are no sizable  posterior communicating arteries. Neck MRA: there is no flow-limiting stenosis. Bilateral common carotid arteries  are patent bilateral carotid bifurcations are patent. Distal left internal  carotid artery dissection is obscured by artifact. Right internal carotid artery  is patent. All measurements utilize NASCET criteria. Impression  IMPRESSION:  1. 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 2. Moderate chronic microvascular ischemic disease. 3. Focal dissection of the distal left internal carotid artery proximal to the  petrous portion. 4. Limited MR angiography neck due to technique. Recommendation: CT angiography head and neck. I discussed this impression with RN Claudeen Sees at 1049 hours on 2/24/2019.     ASSESSMENT:      Osmar Logan is a 80year old RH dominant with history significant for hypertension, dementia, anxiety and known bihemispheric hematomas with seizure disorder who presented to [facility] from her facility for a variety of symptoms including altered sensorium, intermittent left facial weakness and left gaze deviation    RECOMMENDATIONS:  Seizure (?):  Imaging demonstrates persistent though slightly improved bilateral subdurals more prominent on the right than left  As noted in the chart, patient is not always compliant with her seizure medicine MRI did not demonstrate any ischemia  Would recommend Keppra 1000 mg for today can decrease to 750 tomorrow and then resume 500 mg twice daily after that if remains stable  EEG demonstrates moderate slowing no epileptiform discharge and certainly no evidence of high risk of ongoing seizure  If remains stable could likely discharge in the next 24 to 48 hours    Please call with questions, concerns       Keny Seymour MD

## 2022-07-06 NOTE — PROGRESS NOTES

## 2022-07-06 NOTE — CONSULTS
Neurosurgery    Pt known to our service from prior admission when she fractured her femur and was found to have bilateral chronic SDH R>L. No surgery at that time. The patient was scheduled to be seen in the office this Friday with a follow-up head CT. At her SNF last night, she stopped talking and stared into space, which resulted in transport to the hospital. The patient says this morning she was not talking because she didn't want to talk to the nurses because they were saying mean things. Upon arrival to the ER though, she was staring off into space and not responding to sternal rub. This resolved after about 5 minutes. Her daughter reports she was discharged on Keppra but that she doesn't always take it because she refuses meds at the SNF often. In the ER, her head CT was repeated. This shows persistent bilateral acute on chronic SDH's R>L, though slightly diminished from prior head CT. Daughter asking about anticoagulation in regards to recent hip fracture. She was on lovenox briefly during her last hospital stay. There is risk to anticoagulation in this setting with a persistent SDH and possible seizure from SDH. Pt most awake I have seen her. Alert. Oriented x 3. Speech clear. Affect normal. Resting tremors. PERRL. EOMI. Mild flattening left nasolabial fold that resolves with smiling. PACHECO spontaneously. Negative drift. CT head without contrast on 07/06/22 shows redemonstrated bilateral right-larger-than-left acute on chronic subdural hematomas, slightly decreased compared to prior. Stable mass effect upon the right cerebral hemisphere and 5 mm leftward midline shift. MRI brain without contrast on 07/06/22 shows no acute infarction. Probable subacute contusion in the right inferior parietal lobe. Bilateral subacute to chronic extra-axial collections, right larger than left, with superimposed small amount of acute hemorrhage in the right collection.  Stable size when compared to CT obtained on admission and on 6/6/2022. A/P: Bilateral SDH acute on chronic R>L.   - no surgical intervention at this time  - recommend f/u head CT in 3 weeks in office  - discussed with daughter and son at bedside as well as Dr. Lasha Ragsdale and Dr. Lillian Street of hospitalist team.    Al Bush NP

## 2022-07-06 NOTE — PROGRESS NOTES
Problem: Seizure Disorder (Adult)  Goal: *STG: Remains free of seizure activity  Outcome: Progressing Towards Goal  Goal: *STG: Maintains lab values within therapeutic range  Outcome: Progressing Towards Goal  Goal: *STG/LTG: Complies with medication therapy  Outcome: Progressing Towards Goal  Goal: *STG: Remains safe in hospital  Outcome: Progressing Towards Goal

## 2022-07-06 NOTE — PROGRESS NOTES
[address] [facility] Adult  Hospitalist Group                                                                                          Hospitalist Progress Note  Eugenia Pierson MD  Answering service: [phone] from in house phone        Date of Service:  2022  NAME:  Jassi Glass  :  1935  MRN:  [id number]      Admission Summary:   Jassi Glass is a 80 y.o. female with past medical history of anemia, GERD, hypertension, Parkinson's disease, SDH, anxiety, and seizure disorder presented to the ED with chief complaint of \"I would not speak\". Patient is a limited historian. History was difficult to obtain from patient as she was somewhat anxious. Her initial complaint was alleging that \"she\" (referencing non-family caregiver) trying to take things from her. I was able to speak with patient's daughter -Dr. Luz Cole who notes that patient has been making allegations but she resides at [address] [place] [address] of [address] [place] [address] and there apparently there were no safety concerns. Per report, patient was somnolent, unresponsive; with left eye gaze, facial droop, slurred speech per ED; symptoms constant, severe, without specific aggravating/ alleviating factors. Interval history / Subjective:   Pt feeling better today, does not really remember what happened. She is back to baseline per      Assessment & Plan:     Metabolic encephalopathy   Possible seizure  H/o seizure  - Apparently patient has been refusing some of her medications  - Increase keppra, and order EEG, discussed with neurology   - Now back to baseline   - Monitor mental status     Bilateral chronic/subacute SDH with acute area of hemorrhage on the right   - s/p MRI with worseninng   - Neurosurgery following  - CT in 3 weeks per NSGY    H/o recent R hip fracture   Weakness  - PT/OT --> suspect will need to go back to SNF   - Unable to anticoagulate due to above     HTN - add amlodipine, PRN hydralazine. Code status: FULL  Prophylaxis: SCDs due to SDH  Care Plan discussed with: pt, son at bedside   Anticipated Disposition: SNF likely 24-48 hours, needs EEG, neuro and NSGY consults. Hospital Problems  Date Reviewed: 2/28/2019          Codes Class Noted POA    CVA (cerebral vascular accident) [facility]) ICD-10-CM: I63.9  ICD-9-CM: 434.91  7/5/2022 Unknown                Review of Systems:   A comprehensive review of systems was negative except for that written in the HPI. Vital Signs:    Last 24hrs VS reviewed since prior progress note. Most recent are:  Visit Vitals  /81   Pulse 95   Temp 98.6 °F (37 °C)   Resp (!) 1   Ht 5' 1\" (1.549 m)   Wt 48.4 kg (106 lb 11.2 oz)   SpO2 97%   BMI 20.16 kg/m²         Intake/Output Summary (Last 24 hours) at 7/6/2022 1332  Last data filed at 7/5/2022 2100  Gross per 24 hour   Intake --   Output 1000 ml   Net -1000 ml        Physical Examination:     I had a face to face encounter with this patient and independently examined them on 7/6/2022 as outlined below:          Constitutional:  No acute distress, cooperative, pleasant, elderly female     ENT:  Oral mucosa moist, oropharynx benign. Resp:  CTA bilaterally. No wheezing/rhonchi/rales. No accessory muscle use. CV:  Regular rhythm, normal rate, no murmurs, gallops, rubs    GI:  Soft, non distended, non tender. normoactive bowel sounds, no hepatosplenomegaly     Musculoskeletal:  No edema, warm, 2+ pulses throughout    Neurologic:  Moves all extremities.   AAOx3  CN II-XII reviewed            Data Review:    Review and/or order of clinical lab test  Review and/or order of tests in the radiology section of CPT  Review and/or order of tests in the medicine section of CPT      Labs:     Recent Labs     07/06/22  0353 07/05/22  1200   WBC 3.1* 3.3*   HGB 11.0* 10.7*   HCT 34.3* 34.2*    208     Recent Labs     07/06/22  0353 07/05/22  1200    141   K 3.7 3.9    109*   CO2 29 32   BUN 12 14   CREA 0. 68 0.90   GLU 78 116*   CA 8.8 9.1     Recent Labs     07/06/22  0353 07/05/22  1200   ALT <6* 7*   * 126*   TBILI 0.6 0.5   TP 6.6 7.1   ALB 3.4* 3.2*   GLOB 3.2 3.9     Recent Labs     07/06/22  0353 07/05/22  1200   INR 1.1 1.1   PTP 11.3* 11.0      No results for input(s): FE, TIBC, PSAT, FERR in the last 72 hours. No results found for: FOL, RBCF   No results for input(s): PH, PCO2, PO2 in the last 72 hours. No results for input(s): CPK, CKNDX, TROIQ in the last 72 hours.     No lab exists for component: CPKMB  Lab Results   Component Value Date/Time    Cholesterol, total 142 02/26/2019 03:46 AM    HDL Cholesterol 86 02/26/2019 03:46 AM    LDL, calculated 38.8 02/26/2019 03:46 AM    Triglyceride 86 02/26/2019 03:46 AM    CHOL/HDL Ratio 1.7 02/26/2019 03:46 AM     Lab Results   Component Value Date/Time    Glucose (POC) 98 06/10/2022 06:59 AM    Glucose (POC) 105 06/09/2022 10:44 PM    Glucose (POC) 136 (H) 06/09/2022 04:52 PM    Glucose (POC) 127 (H) 06/09/2022 11:26 AM    Glucose (POC) 98 06/09/2022 06:59 AM     Lab Results   Component Value Date/Time    Color YELLOW/STRAW 06/06/2022 09:56 AM    Appearance CLEAR 06/06/2022 09:56 AM    Specific gravity 1.010 06/06/2022 09:56 AM    Specific gravity 1.010 02/23/2019 02:03 PM    pH (UA) 7.5 06/06/2022 09:56 AM    Protein Negative 06/06/2022 09:56 AM    Glucose Negative 06/06/2022 09:56 AM    Ketone Negative 06/06/2022 09:56 AM    Bilirubin Negative 06/06/2022 09:56 AM    Urobilinogen 0.2 06/06/2022 09:56 AM    Nitrites Negative 06/06/2022 09:56 AM    Leukocyte Esterase Negative 06/06/2022 09:56 AM    Epithelial cells FEW 06/06/2022 09:56 AM    Bacteria Negative 06/06/2022 09:56 AM    WBC 0-4 06/06/2022 09:56 AM    RBC 0-5 06/06/2022 09:56 AM         Medications Reviewed:     Current Facility-Administered Medications   Medication Dose Route Frequency    amLODIPine (NORVASC) tablet 5 mg  5 mg Oral DAILY    hydrALAZINE (APRESOLINE) 20 mg/mL injection 10 mg 10 mg IntraVENous Q6H PRN    ondansetron (ZOFRAN) injection 4 mg  4 mg IntraVENous Q8H PRN    acetaminophen (TYLENOL) tablet 650 mg  650 mg Oral Q4H PRN    busPIRone (BUSPAR) tablet 5 mg  5 mg Oral TID WITH MEALS    donepeziL (ARICEPT) tablet 5 mg  5 mg Oral QHS    levETIRAcetam (KEPPRA) tablet 500 mg  500 mg Oral BID    multivitamin, tx-iron-ca-min (THERA-M w/ IRON) tablet 1 Tablet  1 Tablet Oral DAILY    pantoprazole (PROTONIX) tablet 20 mg  20 mg Oral DAILY    sertraline (ZOLOFT) tablet 50 mg  50 mg Oral QPM    atorvastatin (LIPITOR) tablet 10 mg  10 mg Oral QHS    senna-docusate (PERICOLACE) 8.6-50 mg per tablet 1 Tablet  1 Tablet Oral BID    carbidopa-levodopa (SINEMET)  mg per tablet 1.5 Tablet  1.5 Tablet Oral BIDPC    QUEtiapine (SEROquel) tablet 12.5 mg  12.5 mg Oral QAM    QUEtiapine (SEROquel) tablet 25 mg  25 mg Oral QHS     ______________________________________________________________________  EXPECTED LENGTH OF STAY: - - -  ACTUAL LENGTH OF STAY:          1                 Aileen Jones MD

## 2022-07-06 NOTE — ROUTINE PROCESS
TRANSFER - OUT REPORT:    Verbal report given to [facility], RN(name) on Arlette Pulido  being transferred to NSTU(unit) for routine progression of care       Report consisted of patients Situation, Background, Assessment and   Recommendations(SBAR). Information from the following report(s) SBAR, Kardex, ED Summary, Intake/Output, MAR, Med Rec Status, Cardiac Rhythm NSR, Quality Measures and Dual Neuro Assessment was reviewed with the receiving nurse. Lines:   Peripheral IV 07/05/22 Right Antecubital (Active)   Site Assessment Clean, dry, & intact 07/05/22 1318   Phlebitis Assessment 0 07/05/22 1318   Infiltration Assessment 0 07/05/22 1318   Dressing Status Clean, dry, & intact 07/05/22 1318   Dressing Type Transparent 07/05/22 1318        Opportunity for questions and clarification was provided.       Patient transported with:   Monitor  Registered Nurse

## 2022-07-06 NOTE — H&P
History & Physical      Date of admission: 7/5/2022    Patient name: Evan Terrazas  MRN: [id number]  YOB: 1935  Age: 80 y.o. Primary care provider:  Jasper Nunez MD     Source of Information: patient, ED and electronic medical records                              Chief complaint:  \"I would not speak. \"    History of present illness  Evan Terrazas is a 80 y.o. female with past medical history of anemia, GERD, hypertension, Parkinson's disease, SDH, anxiety, and seizure disorder presented to the ED with chief complaint of \"I would not speak\". Patient is a limited historian. History was difficult to obtain from patient as she was somewhat anxious. Her initial complaint was alleging that \"she\" (referencing non-family caregiver) trying to take things from her. I was able to speak with patient's daughter -Dr. Beverly Townsend who notes that patient has been making allegations but she resides at [address] [place] [address] of [address] [place] [address] and there apparently there were no safety concerns. Per report, patient was somnolent, unresponsive; with left eye gaze, facial droop, slurred speech per ED; symptoms constant, severe, without specific aggravating/ alleviating factors. Past Medical History:   Diagnosis Date    Anemia     Dementia ([address]     GERD (gastroesophageal reflux disease)     Heart failure (HCC)     Hypertension     Neurological disorder     parkinsons, seizure, nontraumatic subacute subdural hemorrhage    Orthostatic hypotension     Psychiatric disorder     anxiety    Seizures (HCC)       Past Surgical History:   Procedure Laterality Date    HX GYN      hysterectomy    HX HEENT      catacacts removed    [facility] BREAST SURGERY PROCEDURE UNLISTED      l breast 2 cyst     Prior to Admission medications    Medication Sig Start Date End Date Taking?  Authorizing Provider   busPIRone (BUSPAR) 5 mg tablet Take 5 mg by mouth three (3) times daily (with meals). Yes Provider, Historical   QUEtiapine (SEROqueL) 25 mg tablet Take 12.5 mg by mouth once. AM   Yes Other, MD Zuleika   levETIRAcetam (KEPPRA) 500 mg tablet Take 1 Tablet by mouth two (2) times a day. 6/14/22  Yes Janki Min MD   senna-docusate (PERICOLACE) 8.6-50 mg per tablet Take 1 Tablet by mouth two (2) times a day. 6/14/22  Yes Janki Min MD   carbidopa-levodopa (SINEMET)  mg per tablet Take 1.5 Tablets by mouth two (2) times daily (after meals). Breakfast and dinner   Yes John, MD Zuleika   carbidopa-levodopa ER (SINEMET CR)  mg per tablet Take 1 Tablet by mouth daily (after breakfast). Yes Other, MD Zuleika   pantoprazole (Protonix) 20 mg tablet Take 20 mg by mouth daily. Yes Other, MD Zuleika   sertraline (ZOLOFT) 50 mg tablet Take 50 mg by mouth every evening. Yes Other, MD Zuleika   QUEtiapine (SEROqueL) 25 mg tablet Take 25 mg by mouth once. With dinner   Yes Other, MD Zuleika   multivitamin, tx-iron-ca-min (THERA-M w/ IRON) 9 mg iron-400 mcg tab tablet Take 1 Tablet by mouth daily. Yes Other, MD Zuleika   midodrine (PROAMATINE) 2.5 mg tablet Take 5 mg by mouth daily. Hold for SBP > 150   Yes Other, MD Zuleika   donepeziL (Aricept) 5 mg tablet Take 5 mg by mouth nightly. Yes Other, MD Zuleika   simvastatin (ZOCOR) 20 mg tablet Take  by mouth nightly. Yes Other, MD Zuleika   polyethylene glycol (MIRALAX) 17 gram packet Take 17 g by mouth as needed. Yes Provider, Historical   acetaminophen (TYLENOL ARTHRITIS PAIN) 650 mg CR tablet Take 650 mg by mouth every six (6) hours as needed for Pain. Yes Provider, Historical   oxyCODONE IR (ROXICODONE) 5 mg immediate release tablet Take 5 mg by mouth every four (4) hours as needed for Pain. Provider, Historical   ondansetron (ZOFRAN ODT) 4 mg disintegrating tablet Take 4 mg by mouth every eight (8) hours as needed for Nausea.     Provider, Historical     ALLERGIES:  No Known Allergies      Social history  Patient resides  X  Independently                Ambulates  X  Independently                 Alcohol history   X  None           Smoking history  X  None             Family History   Problem Relation Age of Onset    Stroke Mother     Heart Disease Father           Code status discussed with the patient/next of kin- her daughter- Dr. Elicia Gamino who request Full Code. Review of systems  Pertinent positives as noted in HPI. All other systems were reviewed and were negative     Physical Examination   Visit Vitals  BP (!) 181/84 (BP 1 Location: Left upper arm, BP Patient Position: At rest)   Pulse 68   Temp 98 °F (36.7 °C)   Resp 14   Ht 5' 1\" (1.549 m)   Wt 45.4 kg (100 lb 1.4 oz)   SpO2 98%   BMI 18.91 kg/m²          O2 Device: None (Room air)    General:  Elder female in no acute respiratory distress. Head:  Normocephalic, without obvious abnormality, atraumatic   Eyes:  Conjunctivae/corneas clear. PERRLA, EOMs intact   E/N/M/T: Nares normal. Septum midline.  No nasal drainage or sinus tenderness  Tongue midline/ non-edematous  Clear oropharynx   Neck: Normal appearance and movements, symmetrical, trachea midline  No palpable adenopathy  No thyroid enlargement, tenderness or nodules  No carotid bruit   No JVD  Trachea midline   Lungs:   Symmetrical chest expansion and respiratory effort  Clear to auscultation bilaterally   Chest wall:  No tenderness or deformity   Heart:  Regular rate and rhythm   Normal S1 and S2; no murmur, click, rub or gallop   Abdomen:   Soft, no tenderness  No rebound, guarding, or rigidity  Non-distended   Bowel sounds normal  No masses or hepatosplenomegaly  No hernias present   Back: No costovertebral angle tenderness  No step-off deformity   Extremities: Extremities normal, atraumatic  No cyanosis or edema     Vascular/  Pulses: 2+ radial/ DP bilateral pulses   Integument/  Skin: No rashes or ulcers  Warm and dry   Musculo-      skeletal: Gait not tested  Normal symmetry, ROM, strength and tone  No calf tenderness   Neuro: GCS 14 (E4 V4 M6). Moves all extremities x 4 with generalized weakness. No slurred speech. No facial droop. Sensation grossly intact. No pronator drift. Psych: Alert, oriented x 3; anxious. Geniturinary: With external [employer]  Urinary collection device in place     I reviewed the following data:    EKG:  NSR at 68 bpm.    24 Hour Results:  Recent Results (from the past 24 hour(s))   CBC WITH AUTOMATED DIFF    Collection Time: 07/05/22 12:00 PM   Result Value Ref Range    WBC 3.3 (L) 3.6 - 11.0 K/uL    RBC 3.73 (L) 3.80 - 5.20 M/uL    HGB 10.7 (L) 11.5 - 16.0 g/dL    HCT 34.2 (L) 35.0 - 47.0 %    MCV 91.7 80.0 - 99.0 FL    MCH 28.7 26.0 - 34.0 PG    MCHC 31.3 30.0 - 36.5 g/dL    RDW 15.2 (H) 11.5 - 14.5 %    PLATELET 723 385 - 572 K/uL    MPV 8.7 (L) 8.9 - 12.9 FL    NRBC 0.0 0  WBC    ABSOLUTE NRBC 0.00 0.00 - 0.01 K/uL    NEUTROPHILS 59 32 - 75 %    LYMPHOCYTES 28 12 - 49 %    MONOCYTES 10 5 - 13 %    EOSINOPHILS 2 0 - 7 %    BASOPHILS 1 0 - 1 %    IMMATURE GRANULOCYTES 0 0.0 - 0.5 %    ABS. NEUTROPHILS 2.0 1.8 - 8.0 K/UL    ABS. LYMPHOCYTES 0.9 0.8 - 3.5 K/UL    ABS. MONOCYTES 0.3 0.0 - 1.0 K/UL    ABS. EOSINOPHILS 0.1 0.0 - 0.4 K/UL    ABS. BASOPHILS 0.0 0.0 - 0.1 K/UL    ABS. IMM.  GRANS. 0.0 0.00 - 0.04 K/UL    DF SMEAR SCANNED      RBC COMMENTS NORMOCYTIC, NORMOCHROMIC     METABOLIC PANEL, COMPREHENSIVE    Collection Time: 07/05/22 12:00 PM   Result Value Ref Range    Sodium 141 136 - 145 mmol/L    Potassium 3.9 3.5 - 5.1 mmol/L    Chloride 109 (H) 97 - 108 mmol/L    CO2 32 21 - 32 mmol/L    Anion gap 0 (L) 5 - 15 mmol/L    Glucose 116 (H) 65 - 100 mg/dL    BUN 14 6 - 20 MG/DL    Creatinine 0.90 0.55 - 1.02 MG/DL    BUN/Creatinine ratio 16 12 - 20      GFR est AA >60 >60 ml/min/1.73m2    GFR est non-AA 59 (L) >60 ml/min/1.73m2    Calcium 9.1 8.5 - 10.1 MG/DL    Bilirubin, total 0.5 0.2 - 1.0 MG/DL    ALT (SGPT) 7 (L) 12 - 78 U/L    AST (SGOT) 17 15 - 37 U/L Alk. phosphatase 126 (H) 45 - 117 U/L    Protein, total 7.1 6.4 - 8.2 g/dL    Albumin 3.2 (L) 3.5 - 5.0 g/dL    Globulin 3.9 2.0 - 4.0 g/dL    A-G Ratio 0.8 (L) 1.1 - 2.2     PROTHROMBIN TIME + INR    Collection Time: 07/05/22 12:00 PM   Result Value Ref Range    INR 1.1 0.9 - 1.1      Prothrombin time 11.0 9.0 - 11.1 sec   TROPONIN-HIGH SENSITIVITY    Collection Time: 07/05/22 12:00 PM   Result Value Ref Range    Troponin-High Sensitivity 7 0 - 51 ng/L   SAMPLES BEING HELD    Collection Time: 07/05/22 12:00 PM   Result Value Ref Range    SAMPLES BEING HELD 1RED     COMMENT        Add-on orders for these samples will be processed based on acceptable specimen integrity and analyte stability, which may vary by analyte. Recent Labs     07/05/22  1200   WBC 3.3*   HGB 10.7*   HCT 34.2*        Recent Labs     07/05/22  1200      K 3.9   *   CO2 32   *   BUN 14   CREA 0.90   CA 9.1   ALB 3.2*   TBILI 0.5   ALT 7*   INR 1.1       Imaging  EXAM: CT CODE NEURO HEAD WO CONTRAST     INDICATION: Code Stroke     COMPARISON: CT head 6/8/2022.     CONTRAST: None.     TECHNIQUE: Unenhanced CT of the head was performed using 5 mm images. Brain and  bone windows were generated. Coronal and sagittal reformats. CT dose reduction  was achieved through use of a standardized protocol tailored for this  examination and automatic exposure control for dose modulation.       FINDINGS:  Extensive bilateral acute on chronic subdural hematomas again seen measuring up  to 10 mm on the right and 3 mm on the left (previously 11 mm and 5 mm,  respectively). Mass effect upon the right cerebral hemisphere is similar to  prior. Stable 5 mm leftward midline shift. Generalized volume loss. No CT  evidence of acute infarct. Scattered white matter hypodensities may reflect  chronic microangiopathic change.     The bone windows demonstrate no acute abnormalities.  Chronic heterotopic  ossification along the right frontoparietal convexity. The visualized portions  of the paranasal sinuses and mastoid air cells are clear. Bilateral lens  replacements.     IMPRESSION  Redemonstrated bilateral right-larger-than-left acute on chronic subdural  hematomas, slightly decreased compared to prior. Stable mass effect upon the  right cerebral hemisphere and 5 mm leftward midline shift. EXAMINATION:  CT ANGIOGRAPHY HEAD AND NECK, CT PERFUSION     COMPARISON: June 8, 2022     TECHNIQUE:  Following the uneventful administration of iodinated contrast  material, axial CT angiography of the head and neck was performed. Delayed axial  images through the head were also obtained. Coronal and sagittal reconstructions  were obtained. Manual postprocessing of images was performed. 3-D  Sagittal  maximal intensity projection images were obtained. 3-D Coronal maximal  intensity projections were obtained. CT brain perfusion was performed with  generation of hemodynamic maps of multiple parameters, including cerebral blood  flow, cerebral blood volume, mean transit time, and TMAX. CT dose reduction was  achieved through use of a standardized protocol tailored for this examination  and automatic exposure control for dose modulation. This study was analyzed by  the [address] ai algorithm.     FINDINGS:     Delayed contrast-enhanced head CT:     Stable bilateral subdural hematomas, mixed density. Gyral enhancement in the  right parietal lobe is likely reactive.  Periventricular white matter  hypodensities indicative of chronic microvascular angiopathy.     CTA NECK:     Great vessels: Normal arch anatomy with the origins patent.     Right subclavian artery: Patent     Left subclavian artery: Patent      Right common carotid artery: Patent      Left common carotid artery: Patent      Cervical right internal carotid artery: Patent with no significant stenosis by  NASCET criteria.     Cervical left internal carotid artery: Patent with no significant stenosis by  NASCET criteria. There is a chronic-appearing dissection of the left distal  cervical ICA without flow limitation or associated thrombus.     Right vertebral artery: Patent     Left vertebral artery: Patent     Lung apices are clear. Subcentimeter thyroid nodules bilaterally. No cervical  lymphadenopathy.     CTA HEAD:     Right cavernous internal carotid artery: Patent     Left cavernous internal carotid artery: Patent     Anterior cerebral arteries: Patent     Anterior communicating artery: Patent     Right middle cerebral artery: Patent     Left middle cerebral artery: Patent     Posterior communicating arteries: Patent     Posterior cerebral arteries: Patent     Basilar artery: Patent     Distal vertebral arteries: Patent     No evidence for intracranial aneurysm or hemodynamically significant stenosis.     CT Perfusion:  Normal CT perfusion.     IMPRESSION  1. Chronic left distal ICA dissection without flow limitation or associated  thrombus. 2.  No intracranial large vessel occlusion. 3.  Bilateral subdural hematomas right greater than left. 4.  No perfusion abnormality. Assessment and Plan     1. Altered mental status (AMS)  -admit to telemetry floor  -place on neuro checks and fall precautions  -consult neurologist in a.m.  -order MRI brain without IV contrast    2. History of subdural hematomas  -as noted from last hospitalization/ CT head reports  -CTA head/ neck today showed  chronic left distal ICA dissection without flow limitation or associated thrombus; no intracranial large vessel occlusion; bilateral subdural hematomas right greater than left; no perfusion abnormality. -plan as above    3. Generalized weakness  -consult PT/OT  -ambulate with assistance only    4. Elevated blood pressure without h/o hypertension  -patient's daughter notes that BP is proned to decrease   -monitor BP and provide anti-hypertensive therapy as needed if BP remains elevated    5.  History of seizure  -resume home medication    6. Leukopenia, unspecified  -WBC 3.3; repeat WBC in a.m.    7. Normocytic anemia  -Hgb 10.7; repeat H&H    8.   Anxiety with agitation  -monitor and resume home medication      Diet:  REGULAR DIET  Activity: AMBULATE WITH ASSISTANCE  DVT prophylaxis: SCDs to BLE  Isolation precautions: NONE  Consultations: NEUROLOGIST  Anticipated disposition: [address] [place] [address] Dr /CODE STATUS:  FULL CODE       Signed by: Tanesha Banks MD    July 5, 2022 at 11:57 PM

## 2022-07-06 NOTE — PROGRESS NOTES
2100: Received patient from the ED. Provider at bedside for admission orders. Family at bedside and able to complete MRI screening questions. Family member able to confirm that patient is at baseline neuro status. Amadou Box PTA meds reconciled and given post swalow test.  0345: AM labs drawn  0415: MRI completed without complication. 0730: Bedside shift change report given to BARRY Campos (oncoming nurse) by Rambo Silva (offgoing nurse). Report included the following information SBAR, Kardex, Intake/Output and MAR.

## 2022-07-06 NOTE — PROGRESS NOTES
Transition of Care Plan: SNF-[facility] when medically ready    RUR: 14% low    PCP F/U: Dr. Chanel Esteban    Disposition: SNF    Transportation: [place] Box: Waqar Hawkins: [phone]  Daughter-Rosi Santos: [phone]: Met with patient and son at the bedside. Introduced self and role of CM. Son answered majority of questions. States that patient was independent and living at [facility]. However, due to recent hospitalization (06-06/14) for fall and subdural hemorrhage, patient was discharged to [address] [place] [address] for rehab. Son states [facility] sent patient to the ER when patient was not communicating with staff. Family would like patient to return to [facility] to complete her rehab. Referral sent. Will continue to follow. Chandler Paz RN, CRM    Medicare pt has received, reviewed, and signed 1st IM letter informing them of their right to appeal the discharge. Signed copy has been placed on pt bedside chart. Residency: Lives at [facility] unsure if patient can return   Lives With: alone  Prior functioning:  previously independent at [facility], now needs assistance with ADLs  Prior DME used: currently uses a wheelchair and walker with therapy. Son states patient ambulates 50-60ft with therapy. Prior to previous hospitalization, was ambulatory and used a walker occasionally  Rehab history: Unsure of previous home health agency and [facility]: [address] ..currently [address]    Reason for Readmission:  CVA            RUR Score/Risk Level:  14% low       PCP: First and Last name:  Brianda Dubon   Name of Practice:    Are you a current patient: Yes/No: yes   Approximate date of last visit: May 2022   Can you participate in a virtual visit with your PCP: no    Is a Care Conference indicated: no    Did you attend your follow up appointment (s):   If not, why not:  No, Patient discharged to SNF       Resources/supports as identified by patient/family:  Family provides support, patient was getting a caregiver at her [facility]ATA, patient may have an active LTC policy        Top Challenges facing patient (as identified by patient/family and CM):   Long term plan and if patient can return to SNF    Finances/Medication cost? Covered by insurance      Transportation   BLS  Support system or lack thereof? family      Living arrangements? Was living at [facility]          Self-care/ADLs/Cognition? Confused at times, needs assistance with ADLs, uses a wheelchair           Current Advanced Directive/Advance Care Plan:  Full Code, children are medical decision makers           Plan for utilizing home health: not indicated at this time               Transition of Care Plan:    Based on readmission, the patient's previous Plan of Care   has been evaluated and/or modified. The current Transition of Care Plan is:  SNF         Care Management Interventions  PCP Verified by CM:  Yes (Dr. Freida Goldmann)  Mode of Transport at Discharge: [facility]  Transition of Care Consult (CM Consult): SNF  Support Systems: Child(tootie)  Confirm Follow Up Transport: Other (see comment) (S)  The Plan for Transition of Care is Related to the Following Treatment Goals : SNF  The Patient and/or Patient Representative was Provided with a Choice of Provider and Agrees with the Discharge Plan?: Yes  Freedom of Choice List was Provided with Basic Dialogue that Supports the Patient's Individualized Plan of Care/Goals, Treatment Preferences and Shares the Quality Data Associated with the Providers?: Yes  Discharge Location  Patient Expects to be Discharged to[de-identified] Skilled nursing facility  Readmission Assessment  Number of days since last admission?: 8-30 days  Previous disposition: SNF  Who is being interviewed?: Caregiver  What was the patient's/caregiver's perception as to why they think they needed to return back to the hospital?: Other (Comment) (per family-SNF sent patient for not communicating with staff)  Did you visit your Primary Care Physician after you left the hospital, before you returned this time?: No  Why weren't you able to visit your PCP?: Other (Comment) (patient d/c to SNF)  Did you see a specialist, such as Cardiac, Pulmonary, Orthopedic Physician, etc. after you left the hospital?: No  Who advised the patient to return to the hospital?: Skilled Unit  Does the patient report anything that got in the way of taking their medications?: No  In our efforts to provide the best possible care to you and others like you, can you think of anything that we could have done to help you after you left the hospital the first time, so that you might not have needed to return so soon?: Other (Comment) (n/a)

## 2022-07-06 NOTE — PROGRESS NOTES
RRT called for CP   Pt just finished her large lunch, and family said she ate very quickly. At risk for PE due to recent hip fracture, however unable to anticoagulate given SDH with active bleeding    - Give mylanta to see if there is improvement  - Check EKG and troponin   - Check LE dopplers, if positive could consider IVC filter placement  - Will defer CTA for now, given unable to [facility] even if positive, additionally pt without hypoxia, tachycardia. Monitor. Can consider if pain is ongoing.  Discussed with family and daughter (who is a family medicine physician)

## 2022-07-06 NOTE — PROGRESS NOTES
Patient: Leigh Jeter MRN: [id number]    Age: 80 y.o. YOB: 1935 Room/Bed: [facility]   Consent for video monitoring obtained after the below has been explained to the patient/family on 7/6/2022:  1. They are being monitored continuously in an effort to promote their safety. 2. That there may be times when the camera will be discontinued to provide care to me to ensure my dignity, such as during bathing or any activity that risks me being exposed. 3. They have the right to opt out of having this surveillance monitoring at any time. 4. It has been explained to the patient/family and they understand that the hospital does not maintain any recording of this surveillance monitoring.     Madhu Galan

## 2022-07-07 ENCOUNTER — TRANSCRIBE ORDER (OUTPATIENT)
Dept: SCHEDULING | Age: 87
End: 2022-07-07

## 2022-07-07 DIAGNOSIS — S06.5XAA SDH (SUBDURAL HEMATOMA): Primary | ICD-10-CM

## 2022-07-07 LAB
ALBUMIN SERPL-MCNC: 3.1 G/DL (ref 3.5–5)
ALBUMIN/GLOB SERPL: 0.9 {RATIO} (ref 1.1–2.2)
ALP SERPL-CCNC: 109 U/L (ref 45–117)
ALT SERPL-CCNC: 6 U/L (ref 12–78)
ANION GAP SERPL CALC-SCNC: 7 MMOL/L (ref 5–15)
AST SERPL-CCNC: 19 U/L (ref 15–37)
BASOPHILS # BLD: 0 K/UL (ref 0–0.1)
BASOPHILS NFR BLD: 1 % (ref 0–1)
BILIRUB SERPL-MCNC: 0.5 MG/DL (ref 0.2–1)
BUN SERPL-MCNC: 16 MG/DL (ref 6–20)
BUN/CREAT SERPL: 22 (ref 12–20)
CALCIUM SERPL-MCNC: 9.2 MG/DL (ref 8.5–10.1)
CHLORIDE SERPL-SCNC: 106 MMOL/L (ref 97–108)
CO2 SERPL-SCNC: 28 MMOL/L (ref 21–32)
COMMENT, HOLDF: NORMAL
COVID-19 RAPID TEST, COVR: NOT DETECTED
CREAT SERPL-MCNC: 0.74 MG/DL (ref 0.55–1.02)
DIFFERENTIAL METHOD BLD: ABNORMAL
EOSINOPHIL # BLD: 0.1 K/UL (ref 0–0.4)
EOSINOPHIL NFR BLD: 3 % (ref 0–7)
ERYTHROCYTE [DISTWIDTH] IN BLOOD BY AUTOMATED COUNT: 14.9 % (ref 11.5–14.5)
GLOBULIN SER CALC-MCNC: 3.6 G/DL (ref 2–4)
GLUCOSE SERPL-MCNC: 99 MG/DL (ref 65–100)
HCT VFR BLD AUTO: 32.4 % (ref 35–47)
HGB BLD-MCNC: 10.4 G/DL (ref 11.5–16)
IMM GRANULOCYTES # BLD AUTO: 0 K/UL (ref 0–0.04)
IMM GRANULOCYTES NFR BLD AUTO: 0 % (ref 0–0.5)
LYMPHOCYTES # BLD: 1 K/UL (ref 0.8–3.5)
LYMPHOCYTES NFR BLD: 33 % (ref 12–49)
MAGNESIUM SERPL-MCNC: 2.5 MG/DL (ref 1.6–2.4)
MCH RBC QN AUTO: 28.7 PG (ref 26–34)
MCHC RBC AUTO-ENTMCNC: 32.1 G/DL (ref 30–36.5)
MCV RBC AUTO: 89.5 FL (ref 80–99)
MONOCYTES # BLD: 0.4 K/UL (ref 0–1)
MONOCYTES NFR BLD: 12 % (ref 5–13)
NEUTS SEG # BLD: 1.7 K/UL (ref 1.8–8)
NEUTS SEG NFR BLD: 51 % (ref 32–75)
NRBC # BLD: 0 K/UL (ref 0–0.01)
NRBC BLD-RTO: 0 PER 100 WBC
PHOSPHATE SERPL-MCNC: 2.4 MG/DL (ref 2.6–4.7)
PLATELET # BLD AUTO: 221 K/UL (ref 150–400)
PMV BLD AUTO: 9.7 FL (ref 8.9–12.9)
POTASSIUM SERPL-SCNC: 3.6 MMOL/L (ref 3.5–5.1)
PROT SERPL-MCNC: 6.7 G/DL (ref 6.4–8.2)
RBC # BLD AUTO: 3.62 M/UL (ref 3.8–5.2)
SAMPLES BEING HELD,HOLD: NORMAL
SODIUM SERPL-SCNC: 141 MMOL/L (ref 136–145)
SOURCE, COVRS: NORMAL
WBC # BLD AUTO: 3.2 K/UL (ref 3.6–11)

## 2022-07-07 PROCEDURE — 87635 SARS-COV-2 COVID-19 AMP PRB: CPT

## 2022-07-07 PROCEDURE — 74011250637 HC RX REV CODE- 250/637: Performed by: INTERNAL MEDICINE

## 2022-07-07 PROCEDURE — 85025 COMPLETE CBC W/AUTO DIFF WBC: CPT

## 2022-07-07 PROCEDURE — 84100 ASSAY OF PHOSPHORUS: CPT

## 2022-07-07 PROCEDURE — 83735 ASSAY OF MAGNESIUM: CPT

## 2022-07-07 PROCEDURE — 65270000046 HC RM TELEMETRY

## 2022-07-07 PROCEDURE — 97530 THERAPEUTIC ACTIVITIES: CPT

## 2022-07-07 PROCEDURE — 80053 COMPREHEN METABOLIC PANEL: CPT

## 2022-07-07 PROCEDURE — 97165 OT EVAL LOW COMPLEX 30 MIN: CPT

## 2022-07-07 PROCEDURE — 97163 PT EVAL HIGH COMPLEX 45 MIN: CPT

## 2022-07-07 PROCEDURE — 74011250637 HC RX REV CODE- 250/637: Performed by: FAMILY MEDICINE

## 2022-07-07 PROCEDURE — 74011250636 HC RX REV CODE- 250/636: Performed by: HOSPITALIST

## 2022-07-07 PROCEDURE — 36415 COLL VENOUS BLD VENIPUNCTURE: CPT

## 2022-07-07 RX ORDER — SODIUM CHLORIDE 9 MG/ML
100 INJECTION, SOLUTION INTRAVENOUS CONTINUOUS
Status: DISPENSED | OUTPATIENT
Start: 2022-07-07 | End: 2022-07-08

## 2022-07-07 RX ADMIN — CARBIDOPA AND LEVODOPA 1.5 TABLET: 25; 100 TABLET ORAL at 18:00

## 2022-07-07 RX ADMIN — PANTOPRAZOLE SODIUM 20 MG: 20 TABLET, DELAYED RELEASE ORAL at 08:50

## 2022-07-07 RX ADMIN — BUSPIRONE HYDROCHLORIDE 5 MG: 10 TABLET ORAL at 08:49

## 2022-07-07 RX ADMIN — SODIUM CHLORIDE 100 ML/HR: 9 INJECTION, SOLUTION INTRAVENOUS at 14:59

## 2022-07-07 RX ADMIN — CARBIDOPA AND LEVODOPA 1.5 TABLET: 25; 100 TABLET ORAL at 08:49

## 2022-07-07 RX ADMIN — QUETIAPINE FUMARATE 12.5 MG: 25 TABLET ORAL at 08:49

## 2022-07-07 RX ADMIN — POLYETHYLENE GLYCOL 3350 17 G: 17 POWDER, FOR SOLUTION ORAL at 08:51

## 2022-07-07 RX ADMIN — LEVETIRACETAM 1000 MG: 500 TABLET, FILM COATED ORAL at 08:50

## 2022-07-07 RX ADMIN — BUSPIRONE HYDROCHLORIDE 5 MG: 10 TABLET ORAL at 12:11

## 2022-07-07 RX ADMIN — DOCUSATE SODIUM 50 MG AND SENNOSIDES 8.6 MG 1 TABLET: 8.6; 5 TABLET, FILM COATED ORAL at 18:00

## 2022-07-07 RX ADMIN — BUSPIRONE HYDROCHLORIDE 5 MG: 10 TABLET ORAL at 17:00

## 2022-07-07 RX ADMIN — DOCUSATE SODIUM 50 MG AND SENNOSIDES 8.6 MG 1 TABLET: 8.6; 5 TABLET, FILM COATED ORAL at 08:49

## 2022-07-07 RX ADMIN — SERTRALINE HYDROCHLORIDE 50 MG: 50 TABLET ORAL at 18:00

## 2022-07-07 RX ADMIN — AMLODIPINE BESYLATE 5 MG: 5 TABLET ORAL at 08:49

## 2022-07-07 RX ADMIN — MULTIPLE VITAMINS W/ MINERALS TAB 1 TABLET: TAB at 08:49

## 2022-07-07 NOTE — PROGRESS NOTES
0800: Bedside shift change report given to Mo, RN (oncoming nurse) by Errol Call RN (offgoing nurse). Report included the following information SBAR, Kardex, Procedure Summary, Intake/Output, MAR, Recent Results, Med Rec Status, Cardiac Rhythm sinus rhythm, Alarm Parameters  and Dual Neuro Assessment. 1600: Bedside shift change report given to Juventino Morrell RN (oncoming nurse) by Nai Atkinson RN (offgoing nurse). Report included the following information SBAR, Kardex, Procedure Summary, Intake/Output, MAR, Recent Results, Med Rec Status, Cardiac Rhythm sinus rhythm, Alarm Parameters  and Dual Neuro Assessment. Information discussed with Dr. Zuniga and recommended for pt to be see by behavioral health.

## 2022-07-07 NOTE — PROGRESS NOTES
Problem: Pressure Injury - Risk of  Goal: *Prevention of pressure injury  Description: Document Salty Scale and appropriate interventions in the flowsheet. Outcome: Progressing Towards Goal  Note: Pressure Injury Interventions:  Sensory Interventions: Assess changes in LOC,Assess need for specialty bed,Avoid rigorous massage over bony prominences,Check visual cues for pain,Float heels,Keep linens dry and wrinkle-free,Maintain/enhance activity level,Minimize linen layers,Pressure redistribution bed/mattress (bed type),Turn and reposition approx. every two hours (pillows and wedges if needed)    Moisture Interventions: Absorbent underpads,Apply protective barrier, creams and emollients,Check for incontinence Q2 hours and as needed,Internal/External urinary devices,Limit adult briefs,Minimize layers,Moisture barrier,Offer toileting Q_hr    Activity Interventions: Assess need for specialty bed,Increase time out of bed,Pressure redistribution bed/mattress(bed type),PT/OT evaluation    Mobility Interventions: Assess need for specialty bed,Float heels,Pressure redistribution bed/mattress (bed type),PT/OT evaluation,Turn and reposition approx.  every two hours(pillow and wedges)    Nutrition Interventions: Document food/fluid/supplement intake,Offer support with meals,snacks and hydration    Friction and Shear Interventions: Apply protective barrier, creams and emollients,Foam dressings/transparent film/skin sealants,Minimize layers                Problem: Patient Education: Go to Patient Education Activity  Goal: Patient/Family Education  Outcome: Progressing Towards Goal     Problem: Discharge Planning  Goal: *Discharge to safe environment  Outcome: Progressing Towards Goal  Goal: *Knowledge of medication management  Outcome: Progressing Towards Goal  Goal: *Knowledge of discharge instructions  Outcome: Progressing Towards Goal     Problem: Patient Education: Go to Patient Education Activity  Goal: Patient/Family Education  Outcome: Progressing Towards Goal     Problem: Seizure Disorder (Adult)  Goal: *STG: Remains free of seizure activity  Outcome: Progressing Towards Goal  Goal: *STG: Maintains lab values within therapeutic range  Outcome: Progressing Towards Goal  Goal: *STG/LTG: Complies with medication therapy  Outcome: Progressing Towards Goal  Goal: *STG: Remains free of injury during seizure activity  Outcome: Progressing Towards Goal  Goal: *STG: Remains safe in hospital  Outcome: Progressing Towards Goal  Goal: Interventions  Outcome: Progressing Towards Goal     Problem: Patient Education: Go to Patient Education Activity  Goal: Patient/Family Education  Outcome: Progressing Towards Goal     Problem: Falls - Risk of  Goal: *Absence of Falls  Description: Document Ana Fall Risk and appropriate interventions in the flowsheet.   Outcome: Progressing Towards Goal  Note: Fall Risk Interventions:       Mentation Interventions: Adequate sleep, hydration, pain control,Bed/chair exit alarm,Door open when patient unattended,More frequent rounding,Reorient patient,Room close to nurse's station,Toileting rounds    Medication Interventions: Assess postural VS orthostatic hypotension,Bed/chair exit alarm,Patient to call before getting OOB,Teach patient to arise slowly    Elimination Interventions: Bed/chair exit alarm,Call light in reach,Patient to call for help with toileting needs,Toileting schedule/hourly rounds    History of Falls Interventions: Bed/chair exit alarm,Door open when patient unattended,Evaluate medications/consider consulting pharmacy,Room close to nurse's station         Problem: Patient Education: Go to Patient Education Activity  Goal: Patient/Family Education  Outcome: Progressing Towards Goal

## 2022-07-07 NOTE — PROGRESS NOTES
Transition of Care Plan: SNF-[facility] when medically ready     RUR: 14% low     PCP F/U: Dr. Mya Cruz     Disposition: SNF     Transportation: [facility] on will call     Main Contact: SonLATA Lemus: [phone]  DaughterSteven Santos: [phone]     1215: [address] can accept today before 6pm. Message sent to attending to see if patient is medically ready     [phone]: MD states patient not medically stable. Discharge cancelled.      Liza Goldstein, RN, CRM
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
## 2022-07-07 NOTE — PROGRESS NOTES
Occupational Therapy    Patient was positive for orthostatic hypotension, symptomatic with decreased alertness, dizziness, and poor standing tolerance, recovering in sitting.     Vitals:    07/07/22 1100 07/07/22 1120 07/07/22 1125 07/07/22 1130   BP: 110/61 (!) 92/54 (!) 61/34 (!) 92/52   BP 1 Location: Right upper arm Right upper arm Right upper arm Right upper arm   BP Patient Position: Supine Sitting Sitting  Comment: after standing, very limited standing tolerance Sitting  Comment: after seated recover and quick stand pivot to chair   Pulse: 80 84 92 93     Gracy Collet, OTR/L

## 2022-07-07 NOTE — PROGRESS NOTES
Bedside shift change report given to Mo RN (oncoming nurse) by Linh Caceres RN (offgoing nurse). Report included the following information SBAR, Kardex, Procedure Summary, Intake/Output, MAR, Recent Results, Med Rec Status, Cardiac Rhythm sinus rhythm, Alarm Parameters  and Dual Neuro Assessment.

## 2022-07-07 NOTE — PROGRESS NOTES
Problem: Mobility Impaired (Adult and Pediatric)  Goal: *Acute Goals and Plan of Care (Insert Text)  Description: FUNCTIONAL STATUS PRIOR TO ADMISSION: Pt questionable historian, history taken from chart review. Pt lives at [address] [place] [address] and has additional hired caregiver. Pt has hx of CVA, Parkinson's disease, orthostatic hypotension, dementia and recent R hip fx. Was ambulatory with assistance and use of rollator per family. HOME SUPPORT PRIOR TO ADMISSION: Pt required assistance for mobility and ADLs per chart review. Physical Therapy Goals  Initiated 7/7/2022  1. Patient will move from supine to sit and sit to supine, scoot up and down, and roll side to side in bed with CGA within 7 day(s). 2.  Patient will transfer from bed to chair and chair to bed with CGA using the least restrictive device within 7 day(s). 3.  Patient will perform sit to stand with CGA within 7 day(s). 4.  Patient will ambulate with CGA for 25 feet with the least restrictive device within 7 day(s). Outcome: Not Met  PHYSICAL THERAPY EVALUATION  Patient: Lora Ingram (7 y.o. female)  Date: 7/7/2022  Primary Diagnosis: CVA (cerebral vascular accident) [facility]) [I63.9]        Precautions: fall risk       ASSESSMENT  Based on the objective data described below, the patient presents with orthostatic hypotension, impaired cognition, generalized weakness, impaired balance, poor activity tolerance, and overall decline in functional mobility s/p admission for CVA workup - MRI negative for acute infarct, imagining showed know subacute on chronic bilateral SDH. Pt questionable historian therefore history obtained from chart review. Pt lives at [address] [place] [address] and has additional hired caregiver. Pt has hx of CVA, Parkinson's disease, orthostatic hypotension, dementia and recent R hip fx. Was ambulatory with assistance and use of rollator per family.     This date, pt oriented x2, transferred supine>sit with Al, sit<>stand with Al, and took steps bed>chair with Al. Pt unable to tolerate sustained static standing balance and b/p dropped to 61/34 with pt symptomatic and reporting dizziness. She recovered with seated therex and ended session in chair with all needs met and nursing updated. Recommending return to [facility] with continued assistance with mobility and ADLs. If not, then SNF.        07/07/22 1100 07/07/22 1120 07/07/22 1125 07/07/22 1130   BP: 110/61 (!) 92/54 (!) 61/34 (!) 92/52   BP 1 Location: Right upper arm Right upper arm Right upper arm Right upper arm   BP Patient Position: Supine Sitting Sitting  Comment: after standing, very limited standing tolerance Sitting  Comment: after seated recover and quick stand pivot to chair   Pulse: 80 84 92 93       Current Level of Function Impacting Discharge (mobility/balance): Al for transfers    Functional Outcome Measure: The patient scored 45/100 on the Barthel outcome measure. Other factors to consider for discharge: PMH (see above), orthostatic hypotension      Patient will benefit from skilled therapy intervention to address the above noted impairments. PLAN :  Recommendations and Planned Interventions: bed mobility training, transfer training, gait training, therapeutic exercises, neuromuscular re-education, patient and family training/education, and therapeutic activities      Frequency/Duration: Patient will be followed by physical therapy:  3 times a week to address goals. Recommendation for discharge: (in order for the patient to meet his/her long term goals)  Return to [facility] with continued assistance with mobility and ADLs.   If unable, then SNF    This discharge recommendation:  Has not yet been discussed the attending provider and/or case management    IF patient discharges home will need the following DME: patient owns DME required for discharge         SUBJECTIVE:   Patient stated I only get dizzy when there is some drama or if I'm upset.    OBJECTIVE DATA SUMMARY:   HISTORY:    Past Medical History:   Diagnosis Date    Anemia     Dementia ([address]     GERD (gastroesophageal reflux disease)     Heart failure (HCC)     Hypertension     Neurological disorder     parkinsons, seizure, nontraumatic subacute subdural hemorrhage    Orthostatic hypotension     Psychiatric disorder     anxiety    Seizures (HCC)      Past Surgical History:   Procedure Laterality Date    HX GYN      hysterectomy    HX HEENT      catacacts removed    CO BREAST SURGERY PROCEDURE UNLISTED      l breast 2 cyst     Home Situation  Home Environment: [address] [place] [address] Road Name: [address] [place] [address] Road: Child(tootie)  Patient Expects to be Discharged to[de-identified] Skilled nursing facility  Current DME Used/Available at Home: Ronnie Oneyda, rollator,Walker, rolling,Cane, straight,Shower chair,Grab bars,Commode, bedside  Tub or Shower Type: Shower    EXAMINATION/PRESENTATION/DECISION MAKING:   Critical Behavior:  Neurologic State: Confused,Drowsy,Sleeping  Orientation Level: Disoriented to situation,Oriented to person,Oriented to place,Disoriented to time  Cognition: Decreased attention/concentration,Decreased command following,Poor safety awareness       Range Of Motion:  AROM: Generally decreased, functional                       Strength:    Strength: Generally decreased, functional                    Tone & Sensation:   Tone: Normal              Sensation: Intact               Coordination:  Coordination: Generally decreased, functional  Vision:   Tracking: Able to track right of midline; Requires cues, head turns, or add eye shifts to track; Able to track left of midline  Visual Fields:  (intact)  Acuity: Within Defined Limits  Functional Mobility:  Bed Mobility:     Supine to Sit: Minimum assistance  Sit to Supine:  (up in chair)     Transfers:  Sit to Stand: Minimum assistance  Stand to Sit: Minimum assistance        Bed to Chair: Minimum assistance              Balance: Sitting: Impaired  Sitting - Static: Good (unsupported)  Sitting - Dynamic: Fair (occasional)  Standing: Impaired  Standing - Static: Fair  Standing - Dynamic : Poor;Constant support    Functional Measure:  Barthel Index:    Bathin  Bladder: 5  Bowels: 5  Groomin  Dressin  Feeding: 10  Mobility: 0  Stairs: 0  Toilet Use: 5  Transfer (Bed to Chair and Back): 10  Total: 45/100       The Barthel ADL Index: Guidelines  1. The index should be used as a record of what a patient does, not as a record of what a patient could do. 2. The main aim is to establish degree of independence from any help, physical or verbal, however minor and for whatever reason. 3. The need for supervision renders the patient not independent. 4. A patient's performance should be established using the best available evidence. Asking the patient, friends/relatives and nurses are the usual sources, but direct observation and common sense are also important. However direct testing is not needed. 5. Usually the patient's performance over the preceding 24-48 hours is important, but occasionally longer periods will be relevant. 6. Middle categories imply that the patient supplies over 50 per cent of the effort. 7. Use of aids to be independent is allowed. Score Interpretation (from [address] [address])    Independent   60-79 Minimally independent   40-59 Partially dependent   20-39 Very dependent   <20 Totally dependent     -Topher Umana., Barthel, D.W. (1965). Functional evaluation: the Barthel Index. [facility] ([address] (1997). The Barthel activities of daily living index: self-reporting versus actual performance in the old (> or = 75 years). Journal of [address] [place] [address] 45(7), 14 [facility], .HarjitHarjit, Cabrera Denson., Perla Caldwell (1999).  Measuring the change in disability after inpatient rehabilitation; comparison of the responsiveness of the Barthel Index and Functional [place] Measure. Journal of Neurology, Neurosurgery, and Psychiatry, 66(4), 867-990. WENDY Kelly, EVANGELINA Hogue, & Marcellus Allen M.A. (2004) Assessment of post-stroke quality of life in cost-effectiveness studies: The usefulness of the Barthel Index and the EuroQoL-5D. Quality of Life Research, 15, 514-16           Physical Therapy Evaluation Charge Determination   History Examination Presentation Decision-Making   HIGH Complexity :3+ comorbidities / personal factors will impact the outcome/ POC  HIGH Complexity : 4+ Standardized tests and measures addressing body structure, function, activity limitation and / or participation in recreation  HIGH Complexity : Unstable and unpredictable characteristics  Other outcome measures Barthel  HIGH       Based on the above components, the patient evaluation is determined to be of the following complexity level: HIGH     Activity Tolerance:   Poor    After treatment patient left in no apparent distress:   Sitting in chair, Call bell within reach, and Bed / chair alarm activated    COMMUNICATION/EDUCATION:   The patients plan of care was discussed with: Occupational therapist and Registered nurse. Fall prevention education was provided and the patient/caregiver indicated understanding., Patient/family have participated as able in goal setting and plan of care. , and Patient/family agree to work toward stated goals and plan of care.     Thank you for this referral.  Kd Brown, PT, DPT   Time Calculation: 32 mins

## 2022-07-07 NOTE — PROGRESS NOTES
Problem: Pressure Injury - Risk of  Goal: *Prevention of pressure injury  Description: Document Salty Scale and appropriate interventions in the flowsheet. Outcome: Progressing Towards Goal  Note: Pressure Injury Interventions:  Sensory Interventions: Assess changes in LOC,Check visual cues for pain,Float heels,Keep linens dry and wrinkle-free,Minimize linen layers,Pad between skin to skin    Moisture Interventions: Absorbent underpads,Internal/External urinary devices,Minimize layers    Activity Interventions: Pressure redistribution bed/mattress(bed type)    Mobility Interventions: Float heels,Pressure redistribution bed/mattress (bed type),Turn and reposition approx.  every two hours(pillow and wedges)    Nutrition Interventions: Document food/fluid/supplement intake    Friction and Shear Interventions: Lift sheet,Minimize layers                Problem: Patient Education: Go to Patient Education Activity  Goal: Patient/Family Education  Outcome: Progressing Towards Goal     Problem: Discharge Planning  Goal: *Discharge to safe environment  Outcome: Progressing Towards Goal  Goal: *Knowledge of medication management  Outcome: Progressing Towards Goal  Goal: *Knowledge of discharge instructions  Outcome: Progressing Towards Goal     Problem: Patient Education: Go to Patient Education Activity  Goal: Patient/Family Education  Outcome: Progressing Towards Goal     Problem: Seizure Disorder (Adult)  Goal: *STG: Remains free of seizure activity  Outcome: Progressing Towards Goal  Goal: *STG: Maintains lab values within therapeutic range  Outcome: Progressing Towards Goal  Goal: *STG/LTG: Complies with medication therapy  Outcome: Progressing Towards Goal  Goal: *STG: Remains free of injury during seizure activity  Outcome: Progressing Towards Goal  Goal: *STG: Remains safe in hospital  Outcome: Progressing Towards Goal  Goal: Interventions  Outcome: Progressing Towards Goal     Problem: Patient Education: Go to Patient Education Activity  Goal: Patient/Family Education  Outcome: Progressing Towards Goal     Problem: Falls - Risk of  Goal: *Absence of Falls  Description: Document Orlin Martinez Fall Risk and appropriate interventions in the flowsheet.   Outcome: Progressing Towards Goal  Note: Fall Risk Interventions:       Mentation Interventions: Bed/chair exit alarm,Door open when patient unattended,Reorient patient    Medication Interventions: Evaluate medications/consider consulting pharmacy,Bed/chair exit alarm    Elimination Interventions: Call light in reach,Bed/chair exit alarm,Stay With Me (per policy),Toileting schedule/hourly rounds    History of Falls Interventions: Room close to nurse's station,Door open when patient unattended,Bed/chair exit alarm         Problem: Patient Education: Go to Patient Education Activity  Goal: Patient/Family Education  Outcome: Progressing Towards Goal

## 2022-07-07 NOTE — PROGRESS NOTES
Problem: Self Care Deficits Care Plan (Adult)  Goal: *Acute Goals and Plan of Care (Insert Text)  Description: FUNCTIONAL STATUS PRIOR TO ADMISSION: At baseline patient resided at [facility], then had  GLF resulting in bilateral SDH and R hip fracture, s/p IM nail. Patient was followed by PT/OT during [facility] stay in June 2022 for this GLF and d/c to SNF. HOME SUPPORT: Patient admitted from SNF, where she was ambulatory with staff assistance using RW and received help with ADLs. Occupational Therapy Goals  Initiated 7/7/2022  1. Patient will perform grooming standing at sink with contact guard assist within 7 day(s). 2.  Patient will perform bathing with minimal assistance within 7 day(s). 3.  Patient will perform lower body dressing with moderate assistance within 7 day(s). 4.  Patient will perform toilet transfers with contact guard assist within 7 day(s). 5.  Patient will perform all aspects of toileting with moderate assistance  within 7 day(s). 6.  Patient will participate in upper extremity therapeutic exercise/activities with supervision/set-up for 10 minutes within 7 day(s). 7.  Patient will utilize fall prevention techniques during functional activities with verbal cues within 7 day(s). Outcome: Not Met     OCCUPATIONAL THERAPY EVALUATION  Patient: Apoorva Soriano (77 y.o. female)  Date: 7/7/2022  Primary Diagnosis: CVA (cerebral vascular accident) [facility]) [I63.9]        Precautions: fall       ASSESSMENT  At baseline patient resided at [facility], then had  GLF in June 2022 resulting in bilateral SDH and R hip fracture, s/p IM nail. Patient was followed by PT/OT during [facility] stay for this GLF and d/c to SNF. Patient now readmitted to [facility] from SNF for CVA work-up d/t AMS/ unresponsiveness per chart with brain MRI negative for stroke but positive for acute on chronic bilateral SDH. Note patient has history of dementia and Parkinson's disease at baseline.   Patient presents for OT evaluation with confusion, disorientation, impaired insight into deficits, general weakness, and reduced R hip AROM. Patient was positive for orthostatic hypotension, symptomatic with decreased alertness, dizziness, and poor standing tolerance, recovering in sitting. Recommend return to SNF at d/c. Vitals:     07/07/22 1100 07/07/22 1120 07/07/22 1125 07/07/22 1130   BP: 110/61 (!) 92/54 (!) 61/34 (!) 92/52   BP 1 Location: Right upper arm Right upper arm Right upper arm Right upper arm   BP Patient Position: Supine Sitting Sitting  Comment: after standing, very limited standing tolerance Sitting  Comment: after seated recover and quick stand pivot to chair   Pulse: 80 84 92 93       Current Level of Function Impacting Discharge (ADLs/self-care): Minimum assistance for stand-pivot using RW. Set-up to maximum assistance ADLs    Functional Outcome Measure: The patient scored 45/100 on the Barthel Index outcome measure which is indicative of ~55% impairment in functional performance. Patient will benefit from skilled therapy intervention to address the above noted impairments. PLAN :  Recommendations and Planned Interventions: self care training, functional mobility training, therapeutic exercise, balance training, therapeutic activities, endurance activities, patient education, home safety training and family training/education    Frequency/Duration: Patient will be followed by occupational therapy 3 times a week to address goals. Recommendation for discharge: (in order for the patient to meet his/her long term goals)  Therapy up to 5 days/week in SNF setting    This discharge recommendation:  Has been made in collaboration with the attending provider and/or case management         SUBJECTIVE:   Patient stated I need to sit.     OBJECTIVE DATA SUMMARY:   HISTORY:   Past Medical History:   Diagnosis Date    Anemia     D[address] [address]     GERD (gastroesophageal reflux disease)     Heart failure [address] [address]     Hypertension     Neurological disorder     parkinsons, seizure, nontraumatic subacute subdural hemorrhage    Orthostatic hypotension     Psychiatric disorder     anxiety    Seizures (HCC)      Past Surgical History:   Procedure Laterality Date    HX GYN      hysterectomy    HX HEENT      catacacts removed    DE BREAST SURGERY PROCEDURE UNLISTED      l breast 2 cyst       Expanded or extensive additional review of patient history:     Home Situation  Home Environment: Assisted living  [address] [place] [address] Luis Enrique Name: [address] [place] [address] Road: Child(tootie)  Patient Expects to be Discharged to[de-identified] Skilled nursing facility  Current DME Used/Available at Home: Si Ripa, rollator,Walker, rolling,Cane, straight,Shower chair,Grab bars,Commode, bedside  Tub or Shower Type: Shower      EXAMINATION OF PERFORMANCE DEFICITS:  Cognitive/Behavioral Status:  Neurologic State: Confused;Drowsy;Sleeping  Orientation Level: Disoriented to situation;Oriented to person;Oriented to place; Disoriented to time  Cognition: Decreased attention/concentration;Decreased command following;Poor safety awareness             Skin: visible skin appears intact    Edema: none noted    Hearing: WDL    Vision/Perceptual:    Tracking: Able to track right of midline; Requires cues, head turns, or add eye shifts to track; Able to track left of midline              Visual Fields:  (intact)       Acuity: Within Defined Limits         Range of Motion:    AROM: Generally decreased, functional                         Strength:    Strength: Generally decreased, functional                Coordination:  Coordination: Generally decreased, functional  Fine Motor Skills-Upper: Left Impaired;Right Impaired    Gross Motor Skills-Upper: Left Impaired;Right Impaired    Tone & Sensation:    Tone: Normal  Sensation: Intact                      Balance:  Sitting: Impaired  Sitting - Static: Good (unsupported)  Sitting - Dynamic: Fair (occasional)  Standing: Impaired  Standing - Static: Fair  Standing - Dynamic : Fair    Functional Mobility and Transfers for ADLs:    Transfers:   Sit-stand: Minimum assistance    ADL Assessment:  Feeding: Setup (inferred)    Oral Facial Hygiene/Grooming: Setup (inferred)    Bathing: Moderate assistance (inferred for balance, reach, mobility)         Upper Body Dressing: Setup (inferred)    Lower Body Dressing: Maximum assistance (inferred for balance, reach, mobility)    Toileting: Maximum assistance (inferred)              Functional Measure:    Barthel Index:  Bathin  Bladder: 5  Bowels: 5  Groomin  Dressin  Feeding: 10  Mobility: 0  Stairs: 0  Toilet Use: 5  Transfer (Bed to Chair and Back): 10  Total: 45/100      The Barthel ADL Index: Guidelines  1. The index should be used as a record of what a patient does, not as a record of what a patient could do. 2. The main aim is to establish degree of independence from any help, physical or verbal, however minor and for whatever reason. 3. The need for supervision renders the patient not independent. 4. A patient's performance should be established using the best available evidence. Asking the patient, friends/relatives and nurses are the usual sources, but direct observation and common sense are also important. However direct testing is not needed. 5. Usually the patient's performance over the preceding 24-48 hours is important, but occasionally longer periods will be relevant. 6. Middle categories imply that the patient supplies over 50 per cent of the effort. 7. Use of aids to be independent is allowed. Score Interpretation (from [address] [address])    Independent   60-79 Minimally independent   40-59 Partially dependent   20-39 Very dependent   <20 Totally dependent     -Topher Umana., Barthel, D.W. (1965). Functional evaluation: the Barthel Index. [address] [facility] ([address] (1997).  The Barthel activities of daily living index: self-reporting versus actual performance in the old (> or = 75 years). Journal of [address] [place] [address] 45(0), 14 [facility], [place]OR[place], James Holliday., Franklyn Gardner. (1999). Measuring the change in disability after inpatient rehabilitation; comparison of the responsiveness of the Barthel Index and Functional [place] Measure. Journal of Neurology, Neurosurgery, and Psychiatry, 66(4), 702-885. WENDY Gray, EVANGELINA Hogue, & Adina King M.A. (2004) Assessment of post-stroke quality of life in cost-effectiveness studies: The usefulness of the Barthel Index and the EuroQoL-5D. Quality of Life Research, 15, 190-96         Occupational Therapy Evaluation Charge Determination   History Examination Decision-Making   LOW Complexity : Brief history review  MEDIUM Complexity : 3-5 performance deficits relating to physical, cognitive , or psychosocial skils that result in activity limitations and / or participation restrictions MEDIUM Complexity : Patient may present with comorbidities that affect occupational performnce. Miniml to moderate modification of tasks or assistance (eg, physical or verbal ) with assesment(s) is necessary to enable patient to complete evaluation       Based on the above components, the patient evaluation is determined to be of the following complexity level: LOW   Pain Rating:  Patient did not report pain    Activity Tolerance:  Good    After treatment patient left in no apparent distress:    Sitting in chair, Call bell within reach and Bed / chair alarm activated    COMMUNICATION/EDUCATION:   The patients plan of care was discussed with: Physical therapist, Registered nurse and Case management. Home safety education was provided and the patient/caregiver indicated understanding., Patient/family have participated as able in goal setting and plan of care. and Patient/family agree to work toward stated goals and plan of care.     This patients plan of care is appropriate for delegation to [facility].    Thank you for this referral.  Ruddy Mendez, OT  Time Calculation: 34 mins

## 2022-07-08 VITALS
TEMPERATURE: 98.5 F | OXYGEN SATURATION: 97 % | RESPIRATION RATE: 17 BRPM | BODY MASS INDEX: 20.15 KG/M2 | HEIGHT: 61 IN | SYSTOLIC BLOOD PRESSURE: 160 MMHG | HEART RATE: 73 BPM | WEIGHT: 106.7 LBS | DIASTOLIC BLOOD PRESSURE: 80 MMHG

## 2022-07-08 LAB
ALBUMIN SERPL-MCNC: 3.3 G/DL (ref 3.5–5)
ALBUMIN/GLOB SERPL: 0.8 {RATIO} (ref 1.1–2.2)
ALP SERPL-CCNC: 114 U/L (ref 45–117)
ALT SERPL-CCNC: 12 U/L (ref 12–78)
ANION GAP SERPL CALC-SCNC: 5 MMOL/L (ref 5–15)
AST SERPL-CCNC: 15 U/L (ref 15–37)
BILIRUB SERPL-MCNC: 0.5 MG/DL (ref 0.2–1)
BUN SERPL-MCNC: 12 MG/DL (ref 6–20)
BUN/CREAT SERPL: 16 (ref 12–20)
CALCIUM SERPL-MCNC: 9.2 MG/DL (ref 8.5–10.1)
CHLORIDE SERPL-SCNC: 108 MMOL/L (ref 97–108)
CO2 SERPL-SCNC: 29 MMOL/L (ref 21–32)
CORTIS SERPL-MCNC: 19.5 UG/DL
CREAT SERPL-MCNC: 0.76 MG/DL (ref 0.55–1.02)
GLOBULIN SER CALC-MCNC: 3.9 G/DL (ref 2–4)
GLUCOSE SERPL-MCNC: 106 MG/DL (ref 65–100)
POTASSIUM SERPL-SCNC: 4 MMOL/L (ref 3.5–5.1)
PROT SERPL-MCNC: 7.2 G/DL (ref 6.4–8.2)
SODIUM SERPL-SCNC: 142 MMOL/L (ref 136–145)
TSH SERPL DL<=0.05 MIU/L-ACNC: 2.58 UIU/ML (ref 0.36–3.74)

## 2022-07-08 PROCEDURE — 82533 TOTAL CORTISOL: CPT

## 2022-07-08 PROCEDURE — 84443 ASSAY THYROID STIM HORMONE: CPT

## 2022-07-08 PROCEDURE — 74011250637 HC RX REV CODE- 250/637: Performed by: INTERNAL MEDICINE

## 2022-07-08 PROCEDURE — 74011250637 HC RX REV CODE- 250/637: Performed by: FAMILY MEDICINE

## 2022-07-08 PROCEDURE — 80053 COMPREHEN METABOLIC PANEL: CPT

## 2022-07-08 PROCEDURE — 36415 COLL VENOUS BLD VENIPUNCTURE: CPT

## 2022-07-08 RX ORDER — LEVETIRACETAM 750 MG/1
750 TABLET ORAL 2 TIMES DAILY
Qty: 60 TABLET | Refills: 0 | Status: SHIPPED
Start: 2022-07-08 | End: 2022-08-07

## 2022-07-08 RX ADMIN — CARBIDOPA AND LEVODOPA 1.5 TABLET: 25; 100 TABLET ORAL at 17:19

## 2022-07-08 RX ADMIN — BUSPIRONE HYDROCHLORIDE 5 MG: 10 TABLET ORAL at 08:29

## 2022-07-08 RX ADMIN — PANTOPRAZOLE SODIUM 20 MG: 20 TABLET, DELAYED RELEASE ORAL at 08:27

## 2022-07-08 RX ADMIN — DOCUSATE SODIUM 50 MG AND SENNOSIDES 8.6 MG 1 TABLET: 8.6; 5 TABLET, FILM COATED ORAL at 08:27

## 2022-07-08 RX ADMIN — QUETIAPINE FUMARATE 12.5 MG: 25 TABLET ORAL at 08:26

## 2022-07-08 RX ADMIN — MULTIPLE VITAMINS W/ MINERALS TAB 1 TABLET: TAB at 08:27

## 2022-07-08 RX ADMIN — LEVETIRACETAM 1000 MG: 500 TABLET, FILM COATED ORAL at 08:27

## 2022-07-08 RX ADMIN — POLYETHYLENE GLYCOL 3350 17 G: 17 POWDER, FOR SOLUTION ORAL at 08:27

## 2022-07-08 RX ADMIN — SERTRALINE HYDROCHLORIDE 50 MG: 50 TABLET ORAL at 17:19

## 2022-07-08 RX ADMIN — CARBIDOPA AND LEVODOPA 1.5 TABLET: 25; 100 TABLET ORAL at 08:27

## 2022-07-08 NOTE — DISCHARGE INSTRUCTIONS
Discharge Instructions       PATIENT ID: Maureen Brown  MRN: [id number]   YOB: 1935    DATE OF ADMISSION: 7/5/2022 11:50 AM    DATE OF DISCHARGE: 7/8/2022    PRIMARY CARE PROVIDER: Ayesha Rivera MD     ATTENDING PHYSICIAN: Maribel Jefferson MD  DISCHARGING PROVIDER: Nancy Mcpherson MD    To contact this individual call [phone] and ask the  to page. If unavailable ask to be transferred the Adult Hospitalist Department. DISCHARGE DIAGNOSES orthostatic hypotension, encephalopathy, possible seizure    CONSULTATIONS: IP CONSULT TO NEUROLOGY  IP CONSULT TO NEUROSURGERY    PROCEDURES/SURGERIES: * No surgery found *    PENDING TEST RESULTS:   At the time of discharge the following test results are still pending: n/a    FOLLOW UP APPOINTMENTS:   Follow-up Information     Follow up With Specialties Details Why Contact Info    Ayesha Rivera MD Family Medicine   [address] [place] [address]  [phone]      Lindsay Mann MD Neurosurgery Schedule an appointment as soon as possible for a visit in 3 weeks for repeat head CT [facility] [address] [facility]             ADDITIONAL CARE RECOMMENDATIONS:   Keppra dose increased per neurology recommendations; can consider reducing it as an outpatient. Neurosurgery recommended repeating a CT of the head in 3 weeks to evaluate the chronic/subacute subdural hematomas. Monitor BP and adjust medications as needed  (orthostatic hypotension). DIET: Regular Diet    ACTIVITY: PT/OT Eval and Treat      DISCHARGE MEDICATIONS:   See Medication Reconciliation Form    · It is important that you take the medication exactly as they are prescribed. · Keep your medication in the bottles provided by the pharmacist and keep a list of the medication names, dosages, and times to be taken in your wallet. · Do not take other medications without consulting your doctor. NOTIFY YOUR PHYSICIAN FOR ANY OF THE FOLLOWING:   Fever over 101 degrees for 24 hours. Chest pain, shortness of breath, fever, chills, nausea, vomiting, diarrhea, change in mentation, falling, weakness, bleeding. Severe pain or pain not relieved by medications. Or, any other signs or symptoms that you may have questions about.       DISPOSITION:   x Home With:   OT  PT  HH  RN       SNF/Inpatient Rehab/LTAC    Independent/assisted living    Hospice    Other:         Signed:   Lukasz Asencio MD  7/8/2022  3:02 PM

## 2022-07-08 NOTE — PROGRESS NOTES
Transition of Care Plan: SNF-[facility]  RN to call report to: [phone] ext [phone]     RUR: 14% low     PCP F/U: Dr. Mimi Cavanaugh     Disposition: SNF-[facility]     Transportation: S-[facility]-5:30pm     Main Contact: Myles Mcclain: [phone]  Daughter-Rosi Santos: [phone]    6519: Discharge to [facility] was cancelled yesterday due to orthostatic hypotension. Will continue to follow for medical stability and if ready will set up discharge. 1041: Messaged MD. States patient needs to have her orthostatics rechecked. Notified team in rounds. 1504: Spoke with RN. [facility] MD plans on discharging patient. Left message for Yanira Salazar at [address]: [address] [place] [address] can accept today. Patient and son notified of discharge. RN notified of discharge time    09080 18 02 19: Discharge summary faxed to facility at [phone]     Evelia Tay RN, CRM    Medicare pt has received, reviewed, and signed 2nd IM letter informing them of their right to appeal the discharge. Signed copy has been placed on pt bedside chart. Transition of Care Plan to SNF/Rehab    SNF/Rehab Transition:  Patient has been accepted to [address] [address] and meets criteria for admission. Patient will transported by [facility] and expected to leave at 5:30pm.    Communication to Patient/Family:  Met with patient and son and they are agreeable to the transition plan. Communication to SNF/Rehab:  Bedside RN, Matt De Luna, has been notified to update the transition plan to the facility and call report (phone number [phone] ext [phone]). Discharge information has been updated on the AVS.     Discharge instructions to be fax'd to facility at [facility] # [phone] ). Nursing Please include all hard scripts for controlled substances, med rec and dc summary, and AVS in packet.      Reviewed and confirmed with facility, [address] [place] [address], can manage the patient care needs for the following:     Jaskaran Langley with (X) only those applicable:    Medication:  [x]  Medications will be available at the facility  []  IV Antibiotics   []  Controlled Substance - hard copy to be sent with patient   [x]  Weekly Labs   Documents:  [] Hard RX  [] MAR  [] Kardex  [] AVS  []Transfer Summary  [x]Discharge   Equipment:  []  CPAP/BiPAP  []  Wound Vacuum  []  Tomas or Urinary Device  []  PICC/Central Line  []  Nebulizer  []  Ventilator   Treatment:  []Isolation (for MRSA, VRE, etc.)  []Surgical Drain Management  []Tracheostomy Care  []Dressing Changes  []Dialysis with transportation and chair time  []PEG Care  []Oxygen  []Daily Weights for Heart Failure   Dietary:  []Any diet limitations  []Tube Feedings   []Total Parenteral Management (TPN)   Eligible for Medicaid Long Term Services and Supports  Yes:  [] Eligible for medical assistance or will become eligible within 180 days and UAI completed. [] Provider/Patient and/or support system has requested screening. [] UAI copy provided to patient or responsible party  [] UAI unavailable at discharge will send once processed to SNF provider. [] UAI unavailable at discharged mailed to patient  No:   [] Private pay and is not financially eligible for Medicaid within the next 180 days. [] Reside out-of-state.   [] A residents of a state owned/operated facility that is licensed  by [address] and [place] [facility] or [facility]  [] Enrollment in [facility] hospice services  []  Medical [address]  [] Patient /Family declines to have screening completed or provide financial information for screening     Financial Resources:  Medicaid    [] Initiated and application pending   [] Full coverage     Advanced Care Plan:  []Surrogate Decision Maker of Care  []POA  [x]Communicated Code Status-Full   Other

## 2022-07-08 NOTE — PROGRESS NOTES
Spiritual Care Assessment/Progress Note  [facility]      NAME: Maureen Brown      MRN: [id number]  AGE: 80 y.o. SEX: female  B[employer]ist Affiliation: Scientology   Language: English     7/8/2022     Total Time (in minutes): 17     Spiritual Assessment begun in [address] through conversation with:         [x]Patient        [] Family    [] Friend(s)        Reason for Consult: Request by staff     Spiritual beliefs: (Please include comment if needed)     [] Identifies with a heri tradition:         [] Supported by a heri community:            [] Claims no spiritual orientation:           [] Seeking spiritual identity:                [] Adheres to an individual form of spirituality:           [] Not able to assess:                           Identified resources for coping:      [x] Prayer                               [] Music                  [] Guided Imagery     [] Family/friends                 [] Pet visits     [] Devotional reading                         [] Unknown     [] Other:                                               Interventions offered during this visit: (See comments for more details)    Patient Interventions: Affirmation of heri,Prayer (actual)           Plan of Care:     [] Support spiritual and/or cultural needs    [] Support AMD and/or advance care planning process      [] Support grieving process   [] Coordinate Rites and/or Rituals    [] Coordination with community clergy   [] No spiritual needs identified at this time   [] Detailed Plan of Care below (See Comments)  [] Make referral to Music Therapy  [] Make referral to Pet Therapy     [] Make referral to Addiction services  [] Make referral to [facility]  [] Make referral to Spiritual Care Partner  [] No future visits requested        [x] Contact Spiritual Care for further referrals     Comments: Mrs. Tiera Moss was pleasantly confused during this encounter.  She made references to the death of her mother and others in an accident. I couldn't follow her thoughts about this concern. She seems to have an inner knowing about an upcoming event that she's preparing for, although she couldn't not articulate it. Mrs. Cahrla Hernandez is connected to her spiritual beliefs in God and our discussion about her heri seemed to relax her. She expressed appreciation for my visit and prayer. She welcomes chaplains visits during rounds. .  Gorge Nguyen MDiv, MS, [facility]  Staff

## 2022-07-08 NOTE — PROGRESS NOTES
Attempts made by RN to obtain AM labs. Pt too aggressive with staff when woken up and it is unsafe at this time to try and obtain.  JEREMIAH

## 2022-07-08 NOTE — DISCHARGE SUMMARY
Discharge Summary     Patient: Gracia Godoy MRN: [id number]  SSN: [id number]    YOB: 1935  Age: 80 y.o. Sex: female       Admit Date: 7/5/2022    Discharge Date: 7/8/2022      Admission Diagnoses: CVA (cerebral vascular accident) [facility]) [I63.9]    Discharge Diagnoses:   Orthostatic hypotension  Acute encephalopathy  Seizure disorder  Bilateral chronic/subacute subdural hematomas with superimposed acute hemorrhage  History of right hip fracture  HTN  Parkinson's disease     Discharge Condition: Stable    Hospital Course: 80 y.o lady with Parkinson's, SDH, seizure disorder, presented with AMS. Work-up revealed acute hemorrhage superimposed on chronic subdurals. NSG was consulted and recommended repeat CT head in clinic in 3 weeks. Neurology consulted and recommended increasing Keppra dose. She improved clinically and was stable for discharge, however was noted to be orthostatic. She was given IV fluids and amlodipine stopped. Her orthostasis improved on the day of discharge and she was asymptomatic from that standpoint. Consults: neurology, neurosurgery    Significant Diagnostic Studies: see above  MRI BRAIN WO CONT    Result Date: 7/6/2022  1. No acute infarction. 2. Probable subacute contusion in the right inferior parietal lobe. 3. Bilateral subacute to chronic extra-axial collections, right larger than left, with superimposed small amount of acute hemorrhage in the right collection. Stable size when compared to CT obtained on admission and on 6/6/2022. XR CHEST PORT    Result Date: 7/5/2022  No acute cardiopulmonary abnormality. CTA CODE NEURO HEAD AND NECK W CONT    Result Date: 7/5/2022  1. Chronic left distal ICA dissection without flow limitation or associated thrombus. 2.  No intracranial large vessel occlusion. 3.  Bilateral subdural hematomas right greater than left. 4.  No perfusion abnormality.     CT CODE NEURO HEAD WO CONTRAST    Result Date: 7/5/2022  Redemonstrated bilateral right-larger-than-left acute on chronic subdural hematomas, slightly decreased compared to prior. Stable mass effect upon the right cerebral hemisphere and 5 mm leftward midline shift. CT CODE NEURO PERF W CBF    Result Date: 7/5/2022  1. Chronic left distal ICA dissection without flow limitation or associated thrombus. 2.  No intracranial large vessel occlusion. 3.  Bilateral subdural hematomas right greater than left. 4.  No perfusion abnormality. Disposition: SNF    S: no acute complaints, denies pain, dizziness, n/v/d    Visit Vitals  /88 (BP 1 Location: Right upper arm, BP Patient Position: Standing)   Pulse 80   Temp 98.4 °F (36.9 °C)   Resp 14   Ht 5' 1\" (1.549 m)   Wt 48.4 kg (106 lb 11.2 oz)   SpO2 100%   BMI 20.16 kg/m²     NAD  RRR  Lungs CTAB  Abd soft NT ND      Discharge Medications:   Current Discharge Medication List      CONTINUE these medications which have CHANGED    Details   levETIRAcetam (KEPPRA) 750 mg tablet Take 1 Tablet by mouth two (2) times a day for 30 days. Qty: 60 Tablet, Refills: 0         CONTINUE these medications which have NOT CHANGED    Details   busPIRone (BUSPAR) 5 mg tablet Take 5 mg by mouth three (3) times daily (with meals). !! QUEtiapine (SEROqueL) 25 mg tablet Take 12.5 mg by mouth once. AM      senna-docusate (PERICOLACE) 8.6-50 mg per tablet Take 1 Tablet by mouth two (2) times a day. Qty: 30 Tablet, Refills: 0      carbidopa-levodopa (SINEMET)  mg per tablet Take 1.5 Tablets by mouth two (2) times daily (after meals). Breakfast and dinner      carbidopa-levodopa ER (SINEMET CR)  mg per tablet Take 1 Tablet by mouth daily (after breakfast). pantoprazole (Protonix) 20 mg tablet Take 20 mg by mouth daily. sertraline (ZOLOFT) 50 mg tablet Take 50 mg by mouth every evening.      !! QUEtiapine (SEROqueL) 25 mg tablet Take 25 mg by mouth once.  With dinner      multivitamin, tx-iron-ca-min (THERA-M w/ IRON) 9 mg iron-400 mcg tab tablet Take 1 Tablet by mouth daily. midodrine (PROAMATINE) 2.5 mg tablet Take 5 mg by mouth daily. Hold for SBP > 150      donepeziL (Aricept) 5 mg tablet Take 5 mg by mouth nightly. simvastatin (ZOCOR) 20 mg tablet Take  by mouth nightly. polyethylene glycol (MIRALAX) 17 gram packet Take 17 g by mouth as needed. acetaminophen (TYLENOL ARTHRITIS PAIN) 650 mg CR tablet Take 650 mg by mouth every six (6) hours as needed for Pain. oxyCODONE IR (ROXICODONE) 5 mg immediate release tablet Take 5 mg by mouth every four (4) hours as needed for Pain. ondansetron (ZOFRAN ODT) 4 mg disintegrating tablet Take 4 mg by mouth every eight (8) hours as needed for Nausea. !! - Potential duplicate medications found. Please discuss with provider. Follow-up Information     Follow up With Specialties Details Why Contact Info    Robson Yanes MD Family Medicine   [address] [place] [address]      John Suarez MD Neurosurgery Schedule an appointment as soon as possible for a visit in 3 weeks for repeat head CT [facility] [id number] [facility]            Signed By: Dwight Myers MD     July 8, 2022      Greater than 30 minutes spent on discharge management.

## 2022-07-08 NOTE — PROGRESS NOTES
Problem: Pressure Injury - Risk of  Goal: *Prevention of pressure injury  Description: Document Salty Scale and appropriate interventions in the flowsheet. Outcome: Progressing Towards Goal  Note: Pressure Injury Interventions:  Sensory Interventions: Assess changes in LOC,Avoid rigorous massage over bony prominences,Keep linens dry and wrinkle-free,Maintain/enhance activity level    Moisture Interventions: Absorbent underpads,Apply protective barrier, creams and emollients,Check for incontinence Q2 hours and as needed,Internal/External urinary devices    Activity Interventions: Increase time out of bed,Pressure redistribution bed/mattress(bed type),PT/OT evaluation    Mobility Interventions: Assess need for specialty bed,PT/OT evaluation,Turn and reposition approx.  every two hours(pillow and wedges)    Nutrition Interventions: Document food/fluid/supplement intake,Offer support with meals,snacks and hydration    Friction and Shear Interventions: Apply protective barrier, creams and emollients,Feet elevated on foot rest,Foam dressings/transparent film/skin sealants,HOB 30 degrees or less,Minimize layers,Transferring/repositioning devices                Problem: Discharge Planning  Goal: *Discharge to safe environment  Outcome: Progressing Towards Goal  Goal: *Knowledge of medication management  Outcome: Progressing Towards Goal  Goal: *Knowledge of discharge instructions  Outcome: Progressing Towards Goal     Problem: Seizure Disorder (Adult)  Goal: *STG: Remains free of seizure activity  Outcome: Progressing Towards Goal  Goal: *STG: Maintains lab values within therapeutic range  Outcome: Progressing Towards Goal  Goal: *STG/LTG: Complies with medication therapy  Outcome: Progressing Towards Goal  Goal: *STG: Remains free of injury during seizure activity  Outcome: Progressing Towards Goal  Goal: *STG: Remains safe in hospital  Outcome: Progressing Towards Goal  Goal: Interventions  Outcome: Progressing Towards Goal     Problem: Falls - Risk of  Goal: *Absence of Falls  Description: Document Meek Romero Fall Risk and appropriate interventions in the flowsheet.   Outcome: Progressing Towards Goal  Note: Fall Risk Interventions:       Mentation Interventions: Bed/chair exit alarm,Door open when patient unattended,Increase mobility,Reorient patient,Room close to nurse's station,Toileting rounds,Update white board    Medication Interventions: Bed/chair exit alarm,Evaluate medications/consider consulting pharmacy,Patient to call before getting OOB,Teach patient to arise slowly    Elimination Interventions: Bed/chair exit alarm,Call light in reach,Patient to call for help with toileting needs,Stay With Me (per policy),Toilet paper/wipes in reach,Toileting schedule/hourly rounds    History of Falls Interventions: Bed/chair exit alarm,Door open when patient unattended,Investigate reason for fall,Room close to nurse's station
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
## 2022-07-08 NOTE — PROGRESS NOTES
Problem: Pressure Injury - Risk of  Goal: *Prevention of pressure injury  Description: Document Salty Scale and appropriate interventions in the flowsheet. Outcome: Progressing Towards Goal  Note: Pressure Injury Interventions:  Sensory Interventions: Assess changes in LOC,Check visual cues for pain,Monitor skin under medical devices,Pad between skin to skin    Moisture Interventions: Absorbent underpads,Internal/External urinary devices,Minimize layers    Activity Interventions: Pressure redistribution bed/mattress(bed type)    Mobility Interventions: Float heels,HOB 30 degrees or less,Turn and reposition approx.  every two hours(pillow and wedges)    Nutrition Interventions: Document food/fluid/supplement intake    Friction and Shear Interventions: Lift sheet,HOB 30 degrees or less,Minimize layers                Problem: Patient Education: Go to Patient Education Activity  Goal: Patient/Family Education  Outcome: Progressing Towards Goal     Problem: Discharge Planning  Goal: *Discharge to safe environment  Outcome: Progressing Towards Goal  Goal: *Knowledge of medication management  Outcome: Progressing Towards Goal  Goal: *Knowledge of discharge instructions  Outcome: Progressing Towards Goal     Problem: Patient Education: Go to Patient Education Activity  Goal: Patient/Family Education  Outcome: Progressing Towards Goal     Problem: Seizure Disorder (Adult)  Goal: *STG: Remains free of seizure activity  Outcome: Progressing Towards Goal  Goal: *STG: Maintains lab values within therapeutic range  Outcome: Progressing Towards Goal  Goal: *STG/LTG: Complies with medication therapy  Outcome: Progressing Towards Goal  Goal: *STG: Remains free of injury during seizure activity  Outcome: Progressing Towards Goal  Goal: *STG: Remains safe in hospital  Outcome: Progressing Towards Goal  Goal: Interventions  Outcome: Progressing Towards Goal     Problem: Patient Education: Go to Patient Education Activity  Goal: Patient/Family Education  Outcome: Progressing Towards Goal     Problem: Falls - Risk of  Goal: *Absence of Falls  Description: Document Cordell Salgado Fall Risk and appropriate interventions in the flowsheet.   Outcome: Progressing Towards Goal  Note: Fall Risk Interventions:       Mentation Interventions: Bed/chair exit alarm,Door open when patient unattended,Reorient patient    Medication Interventions: Evaluate medications/consider consulting pharmacy,Bed/chair exit alarm    Elimination Interventions: Bed/chair exit alarm,Call light in reach,Toileting schedule/hourly rounds    History of Falls Interventions: Bed/chair exit alarm,Room close to nurse's station         Problem: Patient Education: Go to Patient Education Activity  Goal: Patient/Family Education  Outcome: Progressing Towards Goal     Problem: Patient Education: Go to Patient Education Activity  Goal: Patient/Family Education  Outcome: Progressing Towards Goal     Problem: Patient Education: Go to Patient Education Activity  Goal: Patient/Family Education  Outcome: Progressing Towards Goal

## 2022-08-09 ENCOUNTER — TRANSCRIBE ORDER (OUTPATIENT)
Dept: SCHEDULING | Age: 87
End: 2022-08-09

## 2022-08-09 DIAGNOSIS — S06.5XAA SDH (SUBDURAL HEMATOMA): Primary | ICD-10-CM

## 2022-08-23 ENCOUNTER — HOSPITAL ENCOUNTER (OUTPATIENT)
Dept: CT IMAGING | Age: 87
Discharge: HOME | End: 2022-08-23
Attending: NEUROLOGICAL SURGERY
Payer: MEDICARE

## 2022-08-23 DIAGNOSIS — S06.5XAA SDH (SUBDURAL HEMATOMA): ICD-10-CM

## 2022-08-23 PROCEDURE — 70450 CT HEAD/BRAIN W/O DYE: CPT

## 2022-11-28 ENCOUNTER — APPOINTMENT (OUTPATIENT)
Dept: CT IMAGING | Age: 87
End: 2022-11-28
Attending: STUDENT IN AN ORGANIZED HEALTH CARE EDUCATION/TRAINING PROGRAM
Payer: MEDICARE

## 2022-11-28 ENCOUNTER — HOSPITAL ENCOUNTER (EMERGENCY)
Age: 87
Discharge: OTHER INSTITUTION | End: 2022-11-29
Attending: STUDENT IN AN ORGANIZED HEALTH CARE EDUCATION/TRAINING PROGRAM
Payer: MEDICARE
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
## 2022-11-28 DIAGNOSIS — S01.81XA FACIAL LACERATION, INITIAL ENCOUNTER: ICD-10-CM

## 2022-11-28 DIAGNOSIS — S09.90XA INJURY OF HEAD, INITIAL ENCOUNTER: ICD-10-CM

## 2022-11-28 PROCEDURE — 72125 CT NECK SPINE W/O DYE: CPT

## 2022-11-28 PROCEDURE — 90714 TD VACC NO PRESV 7 YRS+ IM: CPT | Performed by: STUDENT IN AN ORGANIZED HEALTH CARE EDUCATION/TRAINING PROGRAM

## 2022-11-28 PROCEDURE — 70450 CT HEAD/BRAIN W/O DYE: CPT

## 2022-11-28 PROCEDURE — 99284 EMERGENCY DEPT VISIT MOD MDM: CPT

## 2022-11-28 PROCEDURE — 90471 IMMUNIZATION ADMIN: CPT

## 2022-11-28 PROCEDURE — 74011000250 HC RX REV CODE- 250: Performed by: STUDENT IN AN ORGANIZED HEALTH CARE EDUCATION/TRAINING PROGRAM

## 2022-11-28 PROCEDURE — 74011250636 HC RX REV CODE- 250/636: Performed by: STUDENT IN AN ORGANIZED HEALTH CARE EDUCATION/TRAINING PROGRAM

## 2022-11-28 PROCEDURE — 75810000293 HC SIMP/SUPERF WND  RPR

## 2022-11-28 RX ORDER — LIDOCAINE HYDROCHLORIDE 10 MG/ML
4 INJECTION INFILTRATION; PERINEURAL ONCE
Status: COMPLETED | OUTPATIENT
Start: 2022-11-28 | End: 2022-11-28

## 2022-11-28 RX ADMIN — LIDOCAINE HYDROCHLORIDE 4 ML: 10 INJECTION, SOLUTION INFILTRATION; PERINEURAL at 23:36

## 2022-11-28 RX ADMIN — TETANUS AND DIPHTHERIA TOXOIDS ADSORBED 0.5 ML: 2; 2 INJECTION INTRAMUSCULAR at 23:37

## 2022-11-29 ENCOUNTER — APPOINTMENT (OUTPATIENT)
Dept: CT IMAGING | Age: 87
End: 2022-11-29
Attending: EMERGENCY MEDICINE
Payer: MEDICARE

## 2022-11-29 ENCOUNTER — HOSPITAL ENCOUNTER (EMERGENCY)
Age: 87
Discharge: HOME | End: 2022-11-29
Attending: EMERGENCY MEDICINE
Payer: MEDICARE

## 2022-11-29 VITALS
WEIGHT: 110 LBS | HEIGHT: 61 IN | DIASTOLIC BLOOD PRESSURE: 67 MMHG | HEART RATE: 66 BPM | RESPIRATION RATE: 18 BRPM | SYSTOLIC BLOOD PRESSURE: 152 MMHG | BODY MASS INDEX: 20.77 KG/M2 | TEMPERATURE: 97.5 F | OXYGEN SATURATION: 100 %

## 2022-11-29 VITALS
DIASTOLIC BLOOD PRESSURE: 74 MMHG | HEART RATE: 62 BPM | SYSTOLIC BLOOD PRESSURE: 154 MMHG | RESPIRATION RATE: 18 BRPM | TEMPERATURE: 97.2 F | OXYGEN SATURATION: 100 %

## 2022-11-29 DIAGNOSIS — R40.4 TRANSIENT ALTERATION OF AWARENESS: Primary | ICD-10-CM

## 2022-11-29 LAB
ALBUMIN SERPL-MCNC: 3.9 G/DL (ref 3.5–5)
ALBUMIN/GLOB SERPL: 1 {RATIO} (ref 1.1–2.2)
ALP SERPL-CCNC: 77 U/L (ref 45–117)
ALT SERPL-CCNC: <6 U/L (ref 12–78)
ANION GAP SERPL CALC-SCNC: 4 MMOL/L (ref 5–15)
APPEARANCE UR: CLEAR
AST SERPL-CCNC: 19 U/L (ref 15–37)
ATRIAL RATE: 63 BPM
BACTERIA URNS QL MICRO: NEGATIVE /HPF
BASOPHILS # BLD: 0 K/UL (ref 0–0.1)
BASOPHILS NFR BLD: 1 % (ref 0–1)
BILIRUB SERPL-MCNC: 0.6 MG/DL (ref 0.2–1)
BILIRUB UR QL: NEGATIVE
BUN SERPL-MCNC: 16 MG/DL (ref 6–20)
BUN/CREAT SERPL: 19 (ref 12–20)
CALCIUM SERPL-MCNC: 9.3 MG/DL (ref 8.5–10.1)
CALCULATED P AXIS, ECG09: 90 DEGREES
CALCULATED R AXIS, ECG10: -3 DEGREES
CALCULATED T AXIS, ECG11: 112 DEGREES
CHLORIDE SERPL-SCNC: 104 MMOL/L (ref 97–108)
CO2 SERPL-SCNC: 32 MMOL/L (ref 21–32)
COLOR UR: NORMAL
COMMENT, HOLDF: NORMAL
CREAT SERPL-MCNC: 0.83 MG/DL (ref 0.55–1.02)
DIAGNOSIS, 93000: NORMAL
DIFFERENTIAL METHOD BLD: ABNORMAL
EOSINOPHIL # BLD: 0 K/UL (ref 0–0.4)
EOSINOPHIL NFR BLD: 0 % (ref 0–7)
EPITH CASTS URNS QL MICRO: NORMAL /LPF
ERYTHROCYTE [DISTWIDTH] IN BLOOD BY AUTOMATED COUNT: 15.5 % (ref 11.5–14.5)
GLOBULIN SER CALC-MCNC: 4.1 G/DL (ref 2–4)
GLUCOSE SERPL-MCNC: 94 MG/DL (ref 65–100)
GLUCOSE UR STRIP.AUTO-MCNC: NEGATIVE MG/DL
HCT VFR BLD AUTO: 39.6 % (ref 35–47)
HGB BLD-MCNC: 12.6 G/DL (ref 11.5–16)
HGB UR QL STRIP: NEGATIVE
HYALINE CASTS URNS QL MICRO: NORMAL /LPF (ref 0–5)
IMM GRANULOCYTES # BLD AUTO: 0 K/UL (ref 0–0.04)
IMM GRANULOCYTES NFR BLD AUTO: 0 % (ref 0–0.5)
KETONES UR QL STRIP.AUTO: NEGATIVE MG/DL
LEUKOCYTE ESTERASE UR QL STRIP.AUTO: NEGATIVE
LYMPHOCYTES # BLD: 1.3 K/UL (ref 0.8–3.5)
LYMPHOCYTES NFR BLD: 27 % (ref 12–49)
MAGNESIUM SERPL-MCNC: 2.6 MG/DL (ref 1.6–2.4)
MCH RBC QN AUTO: 30 PG (ref 26–34)
MCHC RBC AUTO-ENTMCNC: 31.8 G/DL (ref 30–36.5)
MCV RBC AUTO: 94.3 FL (ref 80–99)
MONOCYTES # BLD: 0.3 K/UL (ref 0–1)
MONOCYTES NFR BLD: 7 % (ref 5–13)
NEUTS SEG # BLD: 3.3 K/UL (ref 1.8–8)
NEUTS SEG NFR BLD: 65 % (ref 32–75)
NITRITE UR QL STRIP.AUTO: NEGATIVE
NRBC # BLD: 0 K/UL (ref 0–0.01)
NRBC BLD-RTO: 0 PER 100 WBC
P-R INTERVAL, ECG05: 160 MS
PH UR STRIP: 7.5 [PH] (ref 5–8)
PLATELET # BLD AUTO: 172 K/UL (ref 150–400)
PMV BLD AUTO: 9.4 FL (ref 8.9–12.9)
POTASSIUM SERPL-SCNC: 4.3 MMOL/L (ref 3.5–5.1)
PROT SERPL-MCNC: 8 G/DL (ref 6.4–8.2)
PROT UR STRIP-MCNC: NEGATIVE MG/DL
Q-T INTERVAL, ECG07: 496 MS
QRS DURATION, ECG06: 138 MS
QTC CALCULATION (BEZET), ECG08: 507 MS
RBC # BLD AUTO: 4.2 M/UL (ref 3.8–5.2)
RBC #/AREA URNS HPF: NORMAL /HPF (ref 0–5)
SAMPLES BEING HELD,HOLD: NORMAL
SODIUM SERPL-SCNC: 140 MMOL/L (ref 136–145)
SP GR UR REFRACTOMETRY: 1.01 (ref 1–1.03)
TROPONIN-HIGH SENSITIVITY: 21 NG/L (ref 0–51)
UR CULT HOLD, URHOLD: NORMAL
UROBILINOGEN UR QL STRIP.AUTO: 0.2 EU/DL (ref 0.2–1)
VENTRICULAR RATE, ECG03: 63 BPM
WBC # BLD AUTO: 5 K/UL (ref 3.6–11)
WBC URNS QL MICRO: NORMAL /HPF (ref 0–4)

## 2022-11-29 PROCEDURE — 93005 ELECTROCARDIOGRAM TRACING: CPT

## 2022-11-29 PROCEDURE — 70450 CT HEAD/BRAIN W/O DYE: CPT

## 2022-11-29 PROCEDURE — 81001 URINALYSIS AUTO W/SCOPE: CPT

## 2022-11-29 PROCEDURE — 85025 COMPLETE CBC W/AUTO DIFF WBC: CPT

## 2022-11-29 PROCEDURE — 84484 ASSAY OF TROPONIN QUANT: CPT

## 2022-11-29 PROCEDURE — 99284 EMERGENCY DEPT VISIT MOD MDM: CPT

## 2022-11-29 PROCEDURE — 36415 COLL VENOUS BLD VENIPUNCTURE: CPT

## 2022-11-29 PROCEDURE — 80053 COMPREHEN METABOLIC PANEL: CPT

## 2022-11-29 PROCEDURE — 83735 ASSAY OF MAGNESIUM: CPT

## 2022-11-29 NOTE — DISCHARGE INSTRUCTIONS
Patient's laceration was repaired with absorbable sutures. Please keep the wound clean and dry. Apply ice to the wound to help with swelling. Have patient follow-up with her primary care doctor for ER follow-up visit.

## 2022-11-29 NOTE — ED NOTES
Attempted to call [facility] to provide report and ETA however facility didn't answer.

## 2022-11-29 NOTE — ED NOTES
Verbal shift change report given to Ruma Gross (oncoming nurse) by Cammy Freedman RN (offgoing nurse). Report included the following information SBAR, ED Summary, Intake/Output, MAR and Recent Results.

## 2022-11-29 NOTE — ED TRIAGE NOTES
Patient arrived via EMS from [facility] (Room S01-C unit SP2) after a ground level fall with no LOC. Per EMS staff was attempting to redirect patient and patient fell. Per EMS as a result of the fall she has bruising on her right hand and a laceration over her left eye with a bump. Per facility baseline is walker & wheelchair and patient was at her baseline at time of departure from facility     Patient is not on blood thinners     Hx.  Dementia, parkinsons, and TIAs

## 2022-11-29 NOTE — ED TRIAGE NOTES
Pt arrives with EMS from the [facility]. Per EMS, pt had episode of \"unresponsiveness\" in chair where she was slumped over, eyes open, with chest rise/fall but no answering. Sx lasted approx 20 mins. Pt back to baseline.  Hx parkinsons

## 2022-11-29 NOTE — ED PROVIDER NOTES
HPI     Date of Service:  11/28/2022    Patient:  Shannon Vasquez    Chief Complaint:  Merlene Coombs       HPI:  Shannon Vasquez is a 80 y.o.  female with a past medical history of SDH, seizure disorder, dementia, Parkinson's who presents for evaluation of a fall. Per EMS, patient was at her facility when she had a witnessed ground-level fall. Patient reports she tripped over her own shoes. She did strike her head. No blood thinner use. She denies any pain complaints. Per EMS, she is at her current baseline mental status which is alert and oriented x2.       Past Medical History:   Diagnosis Date    Anemia     Dementia (HCC)     GERD (gastroesophageal reflux disease)     Heart failure (HCC)     Hypertension     Neurological disorder     parkinsons, seizure, nontraumatic subacute subdural hemorrhage    Orthostatic hypotension     Psychiatric disorder     anxiety    Seizures (HCC)        Past Surgical History:   Procedure Laterality Date    HX GYN      hysterectomy    HX HEENT      catacacts removed    AK BREAST SURGERY PROCEDURE UNLISTED      l breast 2 cyst         Family History:   Problem Relation Age of Onset    Stroke Mother     Heart Disease Father        Social History     Socioeconomic History    Marital status:      Spouse name: Not on file    Number of children: Not on file    Years of education: Not on file    Highest education level: Not on file   Occupational History    Not on file   Tobacco Use    Smoking status: Never    Smokeless tobacco: Not on file   Substance and Sexual Activity    Alcohol use: Yes     Comment: occasional    Drug use: No    Sexual activity: Not on file   Other Topics Concern    Not on file   Social History Narrative    Not on file     Social Determinants of Health     Financial Resource Strain: Not on file   Food Insecurity: Not on file   Transportation Needs: Not on file   Physical Activity: Not on file   Stress: Not on file   Social Connections: Not on file   Intimate Partner Violence: Not on file   Housing Stability: Not on file         ALLERGIES: Patient has no known allergies. Review of Systems   Reason unable to perform ROS: limited 2/2 to AMS. Constitutional:  Negative for chills and fever. Respiratory:  Negative for shortness of breath. Cardiovascular:  Negative for chest pain. Gastrointestinal:  Negative for abdominal pain, nausea and vomiting. Musculoskeletal:  Negative for back pain and neck pain. Psychiatric/Behavioral:  Positive for confusion. Negative for agitation. There were no vitals filed for this visit. Physical Exam  Vitals and nursing note reviewed. Constitutional:       General: She is not in acute distress. Appearance: Normal appearance. She is not toxic-appearing. HENT:      Head: Normocephalic. Eyes:      General: No scleral icterus. Extraocular Movements: Extraocular movements intact. Conjunctiva/sclera: Conjunctivae normal.   Cardiovascular:      Rate and Rhythm: Normal rate. Pulses: Normal pulses. Pulmonary:      Effort: Pulmonary effort is normal. No respiratory distress. Breath sounds: Normal breath sounds. Abdominal:      General: Abdomen is flat. Palpations: Abdomen is soft. Tenderness: There is no abdominal tenderness. There is no guarding or rebound. Musculoskeletal:         General: No swelling, tenderness, deformity or signs of injury. Normal range of motion. Skin:     General: Skin is warm and dry. Capillary Refill: Capillary refill takes less than 2 seconds. Neurological:      General: No focal deficit present. Mental Status: She is alert. Mental status is at baseline.       Comments: + left facial droop (chronic from previous SDH per July note)   Psychiatric:         Mood and Affect: Mood normal.         Behavior: Behavior normal.        MDM  Number of Diagnoses or Management Options  Facial laceration, initial encounter  Fall, initial encounter  Injury of head, initial encounter  Diagnosis management comments:     DECISION MAKING:  Robson Amador is a 80 y.o. female who comes in as above. On examination, patient is awake, some confusion to the date. Per EMS she is currently at her baseline per the facility. She has a laceration to the left eyebrow with surrounding ecchymosis and edema. There is also an abrasion with ecchymosis and edema overlying the left cheek. She has no midline C/T or L-spine tenderness to palpation. She has full and pain-free range of motion of her bilateral upper and lower extremities. Patient was able to bear weight to use the restroom with assistance. CT imaging of the head and negative for acute intracranial hemorrhage, mass or herniation, there is chronic and unchanged right convexity extra-axial fluid. CT imaging of the cervical spine is negative for fracture or other acute findings. Patient's wound was cleansed and repaired. Please see procedure note. Patient will be discharged back to her facility. Amount and/or Complexity of Data Reviewed  Tests in the radiology section of CPT®: reviewed           Wound Repair    Date/Time: 11/29/2022 12:02 AM  Preparation: skin prepped with alcohol  Location details: face  Wound length:2.5 cm or less  Anesthesia: local infiltration    Anesthesia:  Local Anesthetic: lidocaine 1% without epinephrine  Anesthetic total: 2 mL  Foreign bodies: no foreign bodies  Irrigation solution: saline  Irrigation method: syringe  Debridement: none  Skin closure: gut  Number of sutures: 3  Technique: simple and interrupted  Approximation: close  Patient tolerance: patient tolerated the procedure well with no immediate complications  My total time at bedside, performing this procedure was 1-15 minutes. LABS:  No results found for this or any previous visit (from the past 6 hour(s)). IMAGING:  CT HEAD WO CONT   Final Result   No acute fracture or subluxation.             INDICATION: ground level fall       Exam: Noncontrast CT of the brain is performed with 5 mm collimation. CT dose reduction was achieved with the use of the standardized protocol   tailored for this examination and automatic exposure control for dose   modulation. Direct comparison is made to prior CT dated 8/2022. FINDINGS: Chronic right convexity extra-axial fluid collection is unchanged in   size, measuring 9 mm in greatest transverse dimension. There is age-appropriate   diffuse cortical atrophy. Ventricular system is stable and nondilated. There is   no acute intracranial hemorrhage, mass, mass effect or herniation. There are   stable periventricular white matter hypodensities consistent with chronic   microvascular ischemic changes. There is stable right parietal encephalomalacia. There is no evidence of acute territorial infarct. The mastoid air cells are   well pneumatized. The visualized paranasal sinuses are normal. Right convexity   osteoma is unchanged. IMPRESSION: No interval change. CT SPINE CERV WO CONT   Final Result   No acute fracture or subluxation. INDICATION: ground level fall       Exam: Noncontrast CT of the brain is performed with 5 mm collimation. CT dose reduction was achieved with the use of the standardized protocol   tailored for this examination and automatic exposure control for dose   modulation. Direct comparison is made to prior CT dated 8/2022. FINDINGS: Chronic right convexity extra-axial fluid collection is unchanged in   size, measuring 9 mm in greatest transverse dimension. There is age-appropriate   diffuse cortical atrophy. Ventricular system is stable and nondilated. There is   no acute intracranial hemorrhage, mass, mass effect or herniation. There are   stable periventricular white matter hypodensities consistent with chronic   microvascular ischemic changes. There is stable right parietal encephalomalacia.    There is no evidence of acute territorial infarct. The mastoid air cells are   well pneumatized. The visualized paranasal sinuses are normal. Right convexity   osteoma is unchanged. IMPRESSION: No interval change. Medications During Visit:  Medications   tetanus-diphtheria toxoids-Td (Td) 2-2 Lf unit/0.5 mL injection 0.5 mL (0.5 mL IntraMUSCular Given 11/28/22 2337)   lidocaine (XYLOCAINE) 10 mg/mL (1 %) injection 4 mL (4 mL IntraDERMal Given 11/28/22 2336)         IMPRESSION:  1. Fall, initial encounter    2. Injury of head, initial encounter    3. Facial laceration, initial encounter        DISPOSITION:  Discharged      Current Discharge Medication List           Follow-up Information       Follow up With Specialties Details Why Contact Info    Telly Caldwell MD Internal Medicine Physician Schedule an appointment as soon as possible for a visit   [address] [place] [address]      [address] [facility] Emergency Medicine  If symptoms worsen [facility]  [phone]              The patient is asked to follow-up with their primary care provider in the next several days. They are to call tomorrow for an appointment. The patient is asked to return promptly for any increased concerns or worsening of symptoms. They can return to this emergency department or any other emergency department.       Khushboo Mcclain,

## 2022-11-29 NOTE — ED PROVIDER NOTES
The history is provided by the patient and a relative. No  was used. Altered mental status   This is a recurrent problem. The current episode started 1 to 2 hours ago. The problem has been resolved. Associated symptoms include unresponsiveness. Pertinent negatives include no confusion, no seizures, no weakness, no hallucinations, no self-injury and no numbness. Mental status baseline is moderate dementia. Functional status baseline:  [EPIC#1537^NOTE} Risk factors include dementia. Her past medical history is significant for hypertension, dementia and head trauma. Her past medical history does not include seizures, CVA or TIA.       Past Medical History:   Diagnosis Date    Anemia     Dementia (HCC)     GERD (gastroesophageal reflux disease)     Heart failure (HCC)     Hypertension     Neurological disorder     parkinsons, seizure, nontraumatic subacute subdural hemorrhage    Orthostatic hypotension     Psychiatric disorder     anxiety    Seizures (HCC)        Past Surgical History:   Procedure Laterality Date    HX GYN      hysterectomy    HX HEENT      catacacts removed    HI BREAST SURGERY PROCEDURE UNLISTED      l breast 2 cyst         Family History:   Problem Relation Age of Onset    Stroke Mother     Heart Disease Father        Social History     Socioeconomic History    Marital status:      Spouse name: Not on file    Number of children: Not on file    Years of education: Not on file    Highest education level: Not on file   Occupational History    Not on file   Tobacco Use    Smoking status: Never    Smokeless tobacco: Not on file   Substance and Sexual Activity    Alcohol use: Yes     Comment: occasional    Drug use: No    Sexual activity: Not on file   Other Topics Concern    Not on file   Social History Narrative    Not on file     Social Determinants of Health     Financial Resource Strain: Not on file   Food Insecurity: Not on file   Transportation Needs: Not on file Physical Activity: Not on file   Stress: Not on file   Social Connections: Not on file   Intimate Partner Violence: Not on file   Housing Stability: Not on file         ALLERGIES: Patient has no known allergies. Review of Systems   Constitutional:  Negative for activity change, chills and fever. HENT:  Negative for nosebleeds, sore throat, trouble swallowing and voice change. Eyes:  Negative for visual disturbance. Respiratory:  Negative for shortness of breath. Cardiovascular:  Negative for chest pain and palpitations. Gastrointestinal:  Negative for abdominal pain, constipation, diarrhea and nausea. Genitourinary:  Negative for difficulty urinating, dysuria, hematuria and urgency. Musculoskeletal:  Negative for back pain, neck pain and neck stiffness. Skin:  Negative for color change. Allergic/Immunologic: Negative for immunocompromised state. Neurological:  Negative for dizziness, seizures, syncope, weakness, light-headedness, numbness and headaches. Psychiatric/Behavioral:  Negative for behavioral problems, confusion, hallucinations, self-injury and suicidal ideas. Vitals:    11/29/22 1458   BP: (!) 162/72   Pulse: (!) 52   Resp: 16   Temp: 97.2 °F (36.2 °C)   SpO2: 100%            Physical Exam  Vitals and nursing note reviewed. Constitutional:       General: She is not in acute distress. Appearance: She is well-developed. She is not diaphoretic. HENT:      Head: Normocephalic. Eyes:      Pupils: Pupils are equal, round, and reactive to light. Cardiovascular:      Rate and Rhythm: Normal rate and regular rhythm. Heart sounds: Normal heart sounds. No murmur heard. No friction rub. No gallop. Pulmonary:      Effort: Pulmonary effort is normal. No respiratory distress. Breath sounds: Normal breath sounds. No wheezing. Abdominal:      General: Bowel sounds are normal. There is no distension. Palpations: Abdomen is soft. Tenderness:  There is no abdominal tenderness. There is no guarding or rebound. Musculoskeletal:         General: Normal range of motion. Cervical back: Normal range of motion and neck supple. Skin:     General: Skin is warm. Findings: Ecchymosis present. No rash. Neurological:      Mental Status: She is alert and oriented to person, place, and time. Cranial Nerves: Facial asymmetry present. Psychiatric:         Behavior: Behavior normal.         Thought Content: Thought content normal.         Judgment: Judgment normal.        MDM    This is an 49-year-old female with past medical history, review of systems, physical exam as above, presenting via EMS for complaints of altered mental status. Patient noted to have her daughter at bedside. Daughter states patient was seen here yesterday, after having a ground-level fall, with head injury. Daughter states patient has a issue with orthostasis given her history of Parkinson's disease, and falls not unusual.  She returns to the emergency department today after having approximately 20-minute of \"staring spell\". Daughter states she is also had episodes of these also characterized due to her Parkinson's disease. She denies a history of seizure, chart review noted to include a history of seizure. Patient denies complaints, notable for well-appearing elderly female in no acute distress, hypertensive, afebrile, bradycardic, satting well on room air. She has periorbital ecchymosis on the left, daughter states is secondary to her injury yesterday, mild ecchymosis on the left hand, baseline facial asymmetry, daughter also states is normal for her. She has a soft nontender abdomen, clear breath sounds auscultation. Discussed with patient daughter at bedside, will repeat lab work, head CT, UA, EKG to evaluate for alternative processes, reassess, and make a disposition. Procedures    5:21 PM  Lab work and imaging without acute abnormality.   Results discussed at bedside with patient and family. They are in agreement her episode today was consistent with previous episodes attributed to her Parkinson's disease. Will discharge home per their request to follow-up with primary care or neurology, return precautions given. Patient and family in agreement with plan.

## 2022-12-06 PROBLEM — R26.9 GAIT ABNORMALITY: Status: ACTIVE | Noted: 2022-01-01

## 2022-12-06 PROBLEM — S00.93XS: Status: ACTIVE | Noted: 2022-01-01

## 2022-12-06 PROBLEM — F02.818 DEMENTIA DUE TO PARKINSON'S DISEASE WITH BEHAVIORAL DISTURBANCE (HCC): Status: ACTIVE | Noted: 2022-01-01

## 2022-12-06 PROBLEM — G20 DEMENTIA DUE TO PARKINSON'S DISEASE WITH BEHAVIORAL DISTURBANCE (HCC): Status: ACTIVE | Noted: 2022-01-01

## 2022-12-06 PROBLEM — R29.6 REPEATED FALLS: Status: ACTIVE | Noted: 2022-01-01

## 2022-12-06 PROBLEM — R40.4 TRANSIENT ALTERATION OF AWARENESS: Status: ACTIVE | Noted: 2022-01-01

## 2022-12-06 PROBLEM — G90.3 PARKINSON'S DISEASE WITH NEUROGENIC ORTHOSTATIC HYPOTENSION (HCC): Status: ACTIVE | Noted: 2022-01-01

## 2022-12-06 PROBLEM — F22 PARANOIA (HCC): Status: ACTIVE | Noted: 2022-01-01

## 2023-07-01 NOTE — PROGRESS NOTES
Problem: Knowledge Deficit  Goal: Patient/family/caregiver demonstrates understanding of disease process, treatment plan, medications, and discharge instructions  Description: Complete learning assessment and assess knowledge base.   Interventions:  - Provide teaching at level of understanding  - Provide teaching via preferred learning methods  Outcome: Progressing Transition of Care Plan: TBD: likely SNF vs IPR. Pending medical/therapy recommendations. Need therapy orders for evaluation   Lives at [address]: 9% low    PCP F/U: Nicho Castillo MD    Disposition: SNF vs IPR    Transportation: S    Main Contact: Primary Decision Maker: Titi Cifuentes  [place] - [phone]    Secondary Decision Maker: [facility] - [phone]    9763: CM received consult for discharge planning: SNF vs IPR. No PT/OT orders. Patient also has a hip fracture. Ortho was consulted. Will follow for PT/OT evaluations as well as Ortho plan.       Brittany Hernandez RN, CRM

## 2025-03-17 NOTE — PROGRESS NOTES
Problem: Pressure Injury - Risk of  Goal: *Prevention of pressure injury  Description: Document Salty Scale and appropriate interventions in the flowsheet. Outcome: Progressing Towards Goal  Note: Pressure Injury Interventions:  Sensory Interventions: Assess changes in LOC,Check visual cues for pain,Discuss PT/OT consult with provider,Float heels,Keep linens dry and wrinkle-free    Moisture Interventions: Absorbent underpads,Check for incontinence Q2 hours and as needed,Internal/External urinary devices    Activity Interventions: PT/OT evaluation    Mobility Interventions: PT/OT evaluation,Pressure redistribution bed/mattress (bed type),Turn and reposition approx. every two hours(pillow and wedges)    Nutrition Interventions: Document food/fluid/supplement intake    Friction and Shear Interventions: Apply protective barrier, creams and emollients,Lift sheet,Transferring/repositioning devices                Problem: Patient Education: Go to Patient Education Activity  Goal: Patient/Family Education  Outcome: Progressing Towards Goal     Problem: Falls - Risk of  Goal: *Absence of Falls  Description: Document Mike Lemus Fall Risk and appropriate interventions in the flowsheet.   Outcome: Progressing Towards Goal  Note: Fall Risk Interventions:  Mobility Interventions: Bed/chair exit alarm,Communicate number of staff needed for ambulation/transfer,Patient to call before getting OOB,PT Consult for mobility concerns,OT consult for ADLs    Mentation Interventions: Adequate sleep, hydration, pain control,Bed/chair exit alarm    Medication Interventions: Bed/chair exit alarm,Patient to call before getting OOB    Elimination Interventions: Call light in reach,Patient to call for help with toileting needs,Bed/chair exit alarm,Toileting schedule/hourly rounds    History of Falls Interventions: Bed/chair exit alarm,Consult care management for discharge planning,Investigate reason for fall         Problem: Patient Education: Go to Patient Education Activity  Goal: Patient/Family Education  Outcome: Progressing Towards Goal     Problem: Hypertension  Goal: *Blood pressure within specified parameters  Outcome: Progressing Towards Goal  Goal: *Fluid volume balance  Outcome: Progressing Towards Goal  Goal: *Labs within defined limits  Outcome: Progressing Towards Goal     Problem: Patient Education: Go to Patient Education Activity  Goal: Patient/Family Education  Outcome: Progressing Towards Goal     Problem: Hypotension  Goal: *Blood pressure within specified parameters  Outcome: Progressing Towards Goal  Goal: *Fluid volume balance  Outcome: Progressing Towards Goal  Goal: *Labs within defined limits  Outcome: Progressing Towards Goal     Problem: Patient Education: Go to Patient Education Activity  Goal: Patient/Family Education  Outcome: Progressing Towards Goal     Problem: Patient Education: Go to Patient Education Activity  Goal: Patient/Family Education  Outcome: Progressing Towards Goal     Problem: Patient Education: Go to Patient Education Activity  Goal: Patient/Family Education  Outcome: Progressing Towards Goal ambulate

## (undated) DEVICE — GOWN,SIRUS,NONRNF,SETINSLV,2XL,18/CS: Brand: MEDLINE

## (undated) DEVICE — DRAPE,REIN 53X77,STERILE: Brand: MEDLINE

## (undated) DEVICE — SUTURE MCRYL SZ 3-0 L27IN ABSRB UD L19MM PS-2 3/8 CIR PRIM Y427H

## (undated) DEVICE — FOR ASEPTIC DECANTING OF I.V. FLUIDS FROM FLEXIBLE CONTAINERS.: Brand: BAG DECANTER

## (undated) DEVICE — HYPODERMIC SAFETY NEEDLE: Brand: MAGELLAN

## (undated) DEVICE — SUTURE VCRL SZ 2-0 L36IN ABSRB VLT L36MM CT-1 1/2 CIR J345H

## (undated) DEVICE — GLOVE SURG SZ 75 L12IN FNGR THK79MIL GRN LTX FREE

## (undated) DEVICE — SOLUTION SURG PREP 26 CC PURPREP

## (undated) DEVICE — TOTAL JOINT - SMH: Brand: MEDLINE INDUSTRIES, INC.

## (undated) DEVICE — SOLUTION IRRIG 1000ML STRL H2O USP PLAS POUR BTL

## (undated) DEVICE — ROD RMR L950MM DIA2.5MM W/ EXTN BALL TIP

## (undated) DEVICE — SPONGE GZ W4XL4IN COT 12 PLY TYP VII WVN C FLD DSGN

## (undated) DEVICE — 3M™ IOBAN™ 2 ANTIMICROBIAL INCISE DRAPE 6651EZ: Brand: IOBAN™ 2

## (undated) DEVICE — DRAPE PT ISOLATN 130 IN X 96 IN

## (undated) DEVICE — PREP SKN CHLRAPRP APL 26ML STR --

## (undated) DEVICE — ROCKER SWITCH PENCIL BLADE ELECTRODE, HOLSTER: Brand: EDGE

## (undated) DEVICE — GLOVE SURG SZ 65 THK91MIL LTX FREE SYN POLYISOPRENE

## (undated) DEVICE — DERMABOND SKIN ADH 0.7ML -- DERMABOND ADVANCED 12/BX

## (undated) DEVICE — 3.2MM GUIDE WIRE 400MM

## (undated) DEVICE — BIT DRL L330MM DIA4.2MM CALIB 100MM 3 FLUT QUIK CPL

## (undated) DEVICE — SYR 10ML LUER LOK 1/5ML GRAD --

## (undated) DEVICE — SOLUTION IRRIG 1000ML 0.9% SOD CHL USP POUR PLAS BTL

## (undated) DEVICE — HANDLE LT SNAP ON ULT DURABLE LENS FOR TRUMPF ALC DISPOSABLE

## (undated) DEVICE — REM POLYHESIVE ADULT PATIENT RETURN ELECTRODE: Brand: VALLEYLAB

## (undated) DEVICE — SUTURE VCRL 2-0 L27IN ABSRB CT BRAID COAT UD J275H

## (undated) DEVICE — DRAPE C-ARMOUR C-ARM KIT --

## (undated) DEVICE — 3M™ STERI-DRAPE™ U-DRAPE 1015: Brand: STERI-DRAPE™

## (undated) DEVICE — SUTURE VCRL SZ 0 L27IN ABSRB UD L36MM CT-1 1/2 CIR J260H